# Patient Record
Sex: FEMALE | Race: WHITE | Employment: FULL TIME | URBAN - NONMETROPOLITAN AREA
[De-identification: names, ages, dates, MRNs, and addresses within clinical notes are randomized per-mention and may not be internally consistent; named-entity substitution may affect disease eponyms.]

---

## 2017-01-09 ENCOUNTER — OFFICE VISIT (OUTPATIENT)
Dept: PRIMARY CARE CLINIC | Age: 27
End: 2017-01-09
Payer: COMMERCIAL

## 2017-01-09 VITALS
DIASTOLIC BLOOD PRESSURE: 62 MMHG | BODY MASS INDEX: 34.53 KG/M2 | RESPIRATION RATE: 18 BRPM | OXYGEN SATURATION: 96 % | HEART RATE: 76 BPM | WEIGHT: 220 LBS | SYSTOLIC BLOOD PRESSURE: 114 MMHG | HEIGHT: 67 IN

## 2017-01-09 DIAGNOSIS — F90.9 ADULT ADHD: Primary | ICD-10-CM

## 2017-01-09 DIAGNOSIS — L40.9 SCALP PSORIASIS: ICD-10-CM

## 2017-01-09 DIAGNOSIS — F41.9 ANXIETY: ICD-10-CM

## 2017-01-09 DIAGNOSIS — Z76.89 ENCOUNTER TO ESTABLISH CARE WITH NEW DOCTOR: ICD-10-CM

## 2017-01-09 PROCEDURE — 99204 OFFICE O/P NEW MOD 45 MIN: CPT | Performed by: INTERNAL MEDICINE

## 2017-01-09 RX ORDER — DEXTROAMPHETAMINE SACCHARATE, AMPHETAMINE ASPARTATE, DEXTROAMPHETAMINE SULFATE AND AMPHETAMINE SULFATE 3.75; 3.75; 3.75; 3.75 MG/1; MG/1; MG/1; MG/1
15 TABLET ORAL DAILY
Qty: 30 TABLET | Refills: 0 | Status: SHIPPED | OUTPATIENT
Start: 2017-01-09 | End: 2017-02-06 | Stop reason: SDUPTHER

## 2017-01-09 RX ORDER — DOXYCYCLINE HYCLATE 100 MG
100 TABLET ORAL 2 TIMES DAILY
Qty: 20 TABLET | Refills: 0 | Status: SHIPPED | OUTPATIENT
Start: 2017-01-09 | End: 2017-01-19

## 2017-01-09 RX ORDER — DEXTROAMPHETAMINE SACCHARATE, AMPHETAMINE ASPARTATE, DEXTROAMPHETAMINE SULFATE AND AMPHETAMINE SULFATE 2.5; 2.5; 2.5; 2.5 MG/1; MG/1; MG/1; MG/1
10 TABLET ORAL DAILY
COMMUNITY
End: 2017-01-09 | Stop reason: DRUGHIGH

## 2017-01-09 RX ORDER — CLONAZEPAM 0.25 MG/1
0.25 TABLET, ORALLY DISINTEGRATING ORAL 2 TIMES DAILY PRN
Qty: 60 TABLET | Refills: 1 | Status: SHIPPED | OUTPATIENT
Start: 2017-01-09 | End: 2017-08-10 | Stop reason: SDUPTHER

## 2017-01-09 ASSESSMENT — ENCOUNTER SYMPTOMS
CONSTIPATION: 0
DIARRHEA: 0
COUGH: 0
VOMITING: 0
BACK PAIN: 0
SHORTNESS OF BREATH: 0
NAUSEA: 0

## 2017-01-13 DIAGNOSIS — F90.9 ADULT ADHD: ICD-10-CM

## 2017-01-13 DIAGNOSIS — F41.9 ANXIETY: ICD-10-CM

## 2017-01-13 DIAGNOSIS — Z76.89 ENCOUNTER TO ESTABLISH CARE WITH NEW DOCTOR: ICD-10-CM

## 2017-01-13 LAB
ALBUMIN SERPL-MCNC: 4.2 G/DL (ref 3.5–5.2)
ALP BLD-CCNC: 58 U/L (ref 35–104)
ALT SERPL-CCNC: 14 U/L (ref 5–33)
ANION GAP SERPL CALCULATED.3IONS-SCNC: 14 MMOL/L (ref 7–19)
AST SERPL-CCNC: 19 U/L (ref 5–32)
BILIRUB SERPL-MCNC: 0.5 MG/DL (ref 0.2–1.2)
BILIRUBIN URINE: NEGATIVE
BLOOD, URINE: NEGATIVE
BUN BLDV-MCNC: 10 MG/DL (ref 6–20)
CALCIUM SERPL-MCNC: 8.8 MG/DL (ref 8.6–10)
CHLORIDE BLD-SCNC: 103 MMOL/L (ref 98–111)
CHOLESTEROL, TOTAL: 158 MG/DL (ref 160–199)
CLARITY: ABNORMAL
CO2: 24 MMOL/L (ref 22–29)
COLOR: YELLOW
CREAT SERPL-MCNC: 0.5 MG/DL (ref 0.5–0.9)
CREATININE URINE: 93.7 MG/DL (ref 4.2–622)
GFR NON-AFRICAN AMERICAN: >60
GLOBULIN: 2.5 G/DL
GLUCOSE BLD-MCNC: 100 MG/DL (ref 74–109)
GLUCOSE URINE: NEGATIVE MG/DL
HCT VFR BLD CALC: 41.7 % (ref 37–47)
HDLC SERPL-MCNC: 62 MG/DL (ref 65–121)
HEMOGLOBIN: 14.3 G/DL (ref 12–16)
KETONES, URINE: NEGATIVE MG/DL
LDL CHOLESTEROL CALCULATED: 86 MG/DL
LEUKOCYTE ESTERASE, URINE: NEGATIVE
MCH RBC QN AUTO: 32.2 PG (ref 27–31)
MCHC RBC AUTO-ENTMCNC: 34.3 G/DL (ref 33–37)
MCV RBC AUTO: 93.9 FL (ref 81–99)
NITRITE, URINE: NEGATIVE
PDW BLD-RTO: 12.1 % (ref 11.5–14.5)
PH UA: 6
PLATELET # BLD: 298 K/UL (ref 130–400)
PMV BLD AUTO: 9.9 FL (ref 7.4–10.4)
POTASSIUM SERPL-SCNC: 4.6 MMOL/L (ref 3.5–5)
PROTEIN PROTEIN: 11 MG/DL (ref 15–45)
PROTEIN UA: NEGATIVE MG/DL
RBC # BLD: 4.44 M/UL (ref 4.2–5.4)
SODIUM BLD-SCNC: 141 MMOL/L (ref 136–145)
SPECIFIC GRAVITY UA: 1.01
TOTAL PROTEIN: 6.7 G/DL (ref 6.6–8.7)
TRIGL SERPL-MCNC: 50 MG/DL (ref 150–199)
TSH SERPL DL<=0.05 MIU/L-ACNC: 3.03 UIU/ML (ref 0.27–4.2)
URIC ACID, SERUM: 3.8 MG/DL (ref 2.4–5.7)
UROBILINOGEN, URINE: 0.2 E.U./DL
WBC # BLD: 6 K/UL (ref 4.8–10.8)

## 2017-02-06 RX ORDER — DEXTROAMPHETAMINE SACCHARATE, AMPHETAMINE ASPARTATE, DEXTROAMPHETAMINE SULFATE AND AMPHETAMINE SULFATE 3.75; 3.75; 3.75; 3.75 MG/1; MG/1; MG/1; MG/1
15 TABLET ORAL DAILY
Qty: 30 TABLET | Refills: 0 | Status: SHIPPED | OUTPATIENT
Start: 2017-02-06 | End: 2017-03-14 | Stop reason: SDUPTHER

## 2017-03-06 ENCOUNTER — APPOINTMENT (OUTPATIENT)
Dept: GENERAL RADIOLOGY | Facility: HOSPITAL | Age: 27
End: 2017-03-06

## 2017-03-06 PROCEDURE — 73630 X-RAY EXAM OF FOOT: CPT

## 2017-03-14 RX ORDER — DEXTROAMPHETAMINE SACCHARATE, AMPHETAMINE ASPARTATE, DEXTROAMPHETAMINE SULFATE AND AMPHETAMINE SULFATE 3.75; 3.75; 3.75; 3.75 MG/1; MG/1; MG/1; MG/1
15 TABLET ORAL DAILY
Qty: 30 TABLET | Refills: 0 | Status: SHIPPED | OUTPATIENT
Start: 2017-03-14 | End: 2017-04-17 | Stop reason: SDUPTHER

## 2017-04-10 ENCOUNTER — OFFICE VISIT (OUTPATIENT)
Dept: PRIMARY CARE CLINIC | Age: 27
End: 2017-04-10
Payer: COMMERCIAL

## 2017-04-10 VITALS
HEIGHT: 67 IN | HEART RATE: 84 BPM | WEIGHT: 185 LBS | DIASTOLIC BLOOD PRESSURE: 68 MMHG | BODY MASS INDEX: 29.03 KG/M2 | SYSTOLIC BLOOD PRESSURE: 112 MMHG | OXYGEN SATURATION: 98 % | RESPIRATION RATE: 18 BRPM

## 2017-04-10 DIAGNOSIS — F41.9 ANXIETY: ICD-10-CM

## 2017-04-10 DIAGNOSIS — M54.2 NECK PAIN: Primary | ICD-10-CM

## 2017-04-10 DIAGNOSIS — F90.9 ADULT ADHD: ICD-10-CM

## 2017-04-10 PROCEDURE — 99214 OFFICE O/P EST MOD 30 MIN: CPT | Performed by: INTERNAL MEDICINE

## 2017-04-10 RX ORDER — MELOXICAM 15 MG/1
15 TABLET ORAL DAILY
Qty: 30 TABLET | Refills: 0 | Status: SHIPPED | OUTPATIENT
Start: 2017-04-10 | End: 2018-11-02 | Stop reason: SDUPTHER

## 2017-04-10 ASSESSMENT — ENCOUNTER SYMPTOMS
SHORTNESS OF BREATH: 0
CONSTIPATION: 0
COUGH: 0
DIARRHEA: 0
VOMITING: 0
NAUSEA: 0
BACK PAIN: 0

## 2017-04-17 RX ORDER — DEXTROAMPHETAMINE SACCHARATE, AMPHETAMINE ASPARTATE, DEXTROAMPHETAMINE SULFATE AND AMPHETAMINE SULFATE 3.75; 3.75; 3.75; 3.75 MG/1; MG/1; MG/1; MG/1
15 TABLET ORAL DAILY
Qty: 30 TABLET | Refills: 0 | Status: SHIPPED | OUTPATIENT
Start: 2017-04-17 | End: 2017-05-18 | Stop reason: SDUPTHER

## 2017-05-09 ENCOUNTER — APPOINTMENT (OUTPATIENT)
Dept: GENERAL RADIOLOGY | Facility: HOSPITAL | Age: 27
End: 2017-05-09

## 2017-05-09 ENCOUNTER — OFFICE VISIT (OUTPATIENT)
Dept: PRIMARY CARE CLINIC | Age: 27
End: 2017-05-09
Payer: COMMERCIAL

## 2017-05-09 ENCOUNTER — HOSPITAL ENCOUNTER (EMERGENCY)
Facility: HOSPITAL | Age: 27
Discharge: HOME | End: 2017-05-09
Admitting: EMERGENCY MEDICINE

## 2017-05-09 VITALS
TEMPERATURE: 98.1 F | OXYGEN SATURATION: 98 % | SYSTOLIC BLOOD PRESSURE: 110 MMHG | BODY MASS INDEX: 29.19 KG/M2 | HEIGHT: 67 IN | WEIGHT: 186 LBS | DIASTOLIC BLOOD PRESSURE: 62 MMHG | HEART RATE: 68 BPM | RESPIRATION RATE: 14 BRPM

## 2017-05-09 VITALS
WEIGHT: 165 LBS | RESPIRATION RATE: 20 BRPM | HEART RATE: 70 BPM | TEMPERATURE: 98.6 F | OXYGEN SATURATION: 98 % | BODY MASS INDEX: 25.01 KG/M2 | HEIGHT: 68 IN | DIASTOLIC BLOOD PRESSURE: 68 MMHG | SYSTOLIC BLOOD PRESSURE: 102 MMHG

## 2017-05-09 DIAGNOSIS — B37.31 VAGINAL YEAST INFECTION: ICD-10-CM

## 2017-05-09 DIAGNOSIS — B37.31 VAGINAL YEAST INFECTION: Primary | ICD-10-CM

## 2017-05-09 DIAGNOSIS — Z11.4 SCREENING FOR HIV WITHOUT PRESENCE OF RISK FACTORS: ICD-10-CM

## 2017-05-09 DIAGNOSIS — R30.0 DYSURIA: ICD-10-CM

## 2017-05-09 DIAGNOSIS — R53.83 FATIGUE, UNSPECIFIED TYPE: Primary | ICD-10-CM

## 2017-05-09 LAB
ALBUMIN SERPL-MCNC: 4.1 G/DL (ref 3.5–5)
ALBUMIN/GLOB SERPL: 1.5 G/DL (ref 1.1–2.5)
ALP SERPL-CCNC: 56 U/L (ref 24–120)
ALT SERPL W P-5'-P-CCNC: 21 U/L (ref 0–54)
AMPHET+METHAMPHET UR QL: NEGATIVE
ANION GAP SERPL CALCULATED.3IONS-SCNC: 10 MMOL/L (ref 4–13)
APPEARANCE FLUID: NORMAL
AST SERPL-CCNC: 20 U/L (ref 7–45)
B-HCG UR QL: NEGATIVE
BARBITURATES UR QL SCN: NEGATIVE
BASOPHILS # BLD AUTO: 0.03 10*3/MM3 (ref 0–0.2)
BASOPHILS NFR BLD AUTO: 0.5 % (ref 0–2)
BENZODIAZ UR QL SCN: NEGATIVE
BILIRUB SERPL-MCNC: 0.5 MG/DL (ref 0.1–1)
BILIRUB UR QL STRIP: NEGATIVE
BILIRUBIN, POC: NORMAL
BLOOD URINE, POC: NORMAL
BUN BLD-MCNC: 8 MG/DL (ref 5–21)
BUN/CREAT SERPL: 12.5 (ref 7–25)
CALCIUM SPEC-SCNC: 9.1 MG/DL (ref 8.4–10.4)
CANNABINOIDS SERPL QL: POSITIVE
CHLORIDE SERPL-SCNC: 100 MMOL/L (ref 98–110)
CLARITY UR: CLEAR
CLARITY, POC: NORMAL
CO2 SERPL-SCNC: 29 MMOL/L (ref 24–31)
COCAINE UR QL: NEGATIVE
COLOR UR: YELLOW
COLOR, POC: YELLOW
CREAT BLD-MCNC: 0.64 MG/DL (ref 0.5–1.4)
CRP SERPL-MCNC: <0.5 MG/DL (ref 0–0.99)
DEPRECATED RDW RBC AUTO: 40.8 FL (ref 40–54)
EOSINOPHIL # BLD AUTO: 0.03 10*3/MM3 (ref 0–0.7)
EOSINOPHIL NFR BLD AUTO: 0.5 % (ref 0–4)
ERYTHROCYTE [DISTWIDTH] IN BLOOD BY AUTOMATED COUNT: 12.2 % (ref 12–15)
FLUAV AG NPH QL: NEGATIVE
FLUBV AG NPH QL IA: NEGATIVE
GFR SERPL CREATININE-BSD FRML MDRD: 111 ML/MIN/1.73
GLOBULIN UR ELPH-MCNC: 2.8 GM/DL
GLUCOSE BLD-MCNC: 109 MG/DL (ref 70–100)
GLUCOSE BLDC GLUCOMTR-MCNC: 72 MG/DL (ref 70–130)
GLUCOSE UR STRIP-MCNC: NEGATIVE MG/DL
GLUCOSE URINE, POC: NORMAL
HBA1C MFR BLD: 5.1 %
HCT VFR BLD AUTO: 43.2 % (ref 37–47)
HGB BLD-MCNC: 15.1 G/DL (ref 12–16)
HGB UR QL STRIP.AUTO: NEGATIVE
IMM GRANULOCYTES # BLD: 0.02 10*3/MM3 (ref 0–0.03)
IMM GRANULOCYTES NFR BLD: 0.3 % (ref 0–5)
INTERNAL NEGATIVE CONTROL: NEGATIVE
INTERNAL POSITIVE CONTROL: POSITIVE
KETONES UR QL STRIP: NEGATIVE
KETONES, POC: NORMAL
LEUKOCYTE EST, POC: NORMAL
LEUKOCYTE ESTERASE UR QL STRIP.AUTO: NEGATIVE
LYMPHOCYTES # BLD AUTO: 2.11 10*3/MM3 (ref 0.72–4.86)
LYMPHOCYTES NFR BLD AUTO: 33.3 % (ref 15–45)
Lab: NORMAL
MCH RBC QN AUTO: 31.9 PG (ref 28–32)
MCHC RBC AUTO-ENTMCNC: 35 G/DL (ref 33–36)
MCV RBC AUTO: 91.1 FL (ref 82–98)
METHADONE UR QL SCN: NEGATIVE
MONOCYTES # BLD AUTO: 0.39 10*3/MM3 (ref 0.19–1.3)
MONOCYTES NFR BLD AUTO: 6.2 % (ref 4–12)
NEUTROPHILS # BLD AUTO: 3.76 10*3/MM3 (ref 1.87–8.4)
NEUTROPHILS NFR BLD AUTO: 59.2 % (ref 39–78)
NITRITE UR QL STRIP: NEGATIVE
NITRITE, POC: NORMAL
OPIATES UR QL: NEGATIVE
PCP UR QL SCN: NEGATIVE
PH UR STRIP.AUTO: <=5 [PH] (ref 5–8)
PH, POC: 5.5
PLATELET # BLD AUTO: 257 10*3/MM3 (ref 130–400)
PMV BLD AUTO: 9.6 FL (ref 6–12)
POTASSIUM BLD-SCNC: 4.2 MMOL/L (ref 3.5–5.3)
PROT SERPL-MCNC: 6.9 G/DL (ref 6.3–8.7)
PROT UR QL STRIP: NEGATIVE
PROTEIN, POC: NORMAL
RBC # BLD AUTO: 4.74 10*6/MM3 (ref 4.2–5.4)
SODIUM BLD-SCNC: 139 MMOL/L (ref 135–145)
SP GR UR STRIP: 1.02 (ref 1–1.03)
SPECIFIC GRAVITY, POC: 1.03
T4 FREE SERPL-MCNC: 1 NG/DL (ref 0.78–2.19)
TSH SERPL DL<=0.05 MIU/L-ACNC: 1.58 MIU/ML (ref 0.47–4.68)
UROBILINOGEN UR QL STRIP: NORMAL
UROBILINOGEN, POC: 0.2
WBC NRBC COR # BLD: 6.34 10*3/MM3 (ref 4.8–10.8)

## 2017-05-09 PROCEDURE — 80307 DRUG TEST PRSMV CHEM ANLYZR: CPT | Performed by: NURSE PRACTITIONER

## 2017-05-09 PROCEDURE — 87804 INFLUENZA ASSAY W/OPTIC: CPT | Performed by: NURSE PRACTITIONER

## 2017-05-09 PROCEDURE — 96360 HYDRATION IV INFUSION INIT: CPT

## 2017-05-09 PROCEDURE — 99283 EMERGENCY DEPT VISIT LOW MDM: CPT

## 2017-05-09 PROCEDURE — 80053 COMPREHEN METABOLIC PANEL: CPT | Performed by: NURSE PRACTITIONER

## 2017-05-09 PROCEDURE — 84443 ASSAY THYROID STIM HORMONE: CPT | Performed by: NURSE PRACTITIONER

## 2017-05-09 PROCEDURE — 85025 COMPLETE CBC W/AUTO DIFF WBC: CPT | Performed by: NURSE PRACTITIONER

## 2017-05-09 PROCEDURE — 71020 HC CHEST PA AND LATERAL: CPT

## 2017-05-09 PROCEDURE — 81002 URINALYSIS NONAUTO W/O SCOPE: CPT | Performed by: NURSE PRACTITIONER

## 2017-05-09 PROCEDURE — 86140 C-REACTIVE PROTEIN: CPT | Performed by: NURSE PRACTITIONER

## 2017-05-09 PROCEDURE — 82962 GLUCOSE BLOOD TEST: CPT

## 2017-05-09 PROCEDURE — 99213 OFFICE O/P EST LOW 20 MIN: CPT | Performed by: NURSE PRACTITIONER

## 2017-05-09 PROCEDURE — 84439 ASSAY OF FREE THYROXINE: CPT | Performed by: NURSE PRACTITIONER

## 2017-05-09 PROCEDURE — 81003 URINALYSIS AUTO W/O SCOPE: CPT | Performed by: NURSE PRACTITIONER

## 2017-05-09 RX ORDER — SODIUM CHLORIDE 0.9 % (FLUSH) 0.9 %
10 SYRINGE (ML) INJECTION AS NEEDED
Status: DISCONTINUED | OUTPATIENT
Start: 2017-05-09 | End: 2017-05-09 | Stop reason: HOSPADM

## 2017-05-09 RX ORDER — FLUTICASONE PROPIONATE 50 MCG
2 SPRAY, SUSPENSION (ML) NASAL DAILY
COMMUNITY
End: 2020-08-25

## 2017-05-09 RX ADMIN — SODIUM CHLORIDE 1000 ML: 9 INJECTION, SOLUTION INTRAVENOUS at 11:11

## 2017-05-09 ASSESSMENT — ENCOUNTER SYMPTOMS
ALLERGIC/IMMUNOLOGIC NEGATIVE: 1
RESPIRATORY NEGATIVE: 1
EYES NEGATIVE: 1
GASTROINTESTINAL NEGATIVE: 1

## 2017-05-12 LAB — HIV-1 AND HIV-2 ANTIBODIES: NEGATIVE

## 2017-05-17 ENCOUNTER — OFFICE VISIT (OUTPATIENT)
Dept: RETAIL CLINIC | Facility: CLINIC | Age: 27
End: 2017-05-17

## 2017-05-17 DIAGNOSIS — Z02.83 ENCOUNTER FOR DRUG SCREENING: Primary | ICD-10-CM

## 2017-05-18 RX ORDER — DEXTROAMPHETAMINE SACCHARATE, AMPHETAMINE ASPARTATE, DEXTROAMPHETAMINE SULFATE AND AMPHETAMINE SULFATE 3.75; 3.75; 3.75; 3.75 MG/1; MG/1; MG/1; MG/1
15 TABLET ORAL DAILY
Qty: 30 TABLET | Refills: 0 | Status: SHIPPED | OUTPATIENT
Start: 2017-05-18 | End: 2017-06-13 | Stop reason: SDUPTHER

## 2017-06-13 RX ORDER — DEXTROAMPHETAMINE SACCHARATE, AMPHETAMINE ASPARTATE, DEXTROAMPHETAMINE SULFATE AND AMPHETAMINE SULFATE 3.75; 3.75; 3.75; 3.75 MG/1; MG/1; MG/1; MG/1
15 TABLET ORAL DAILY
Qty: 30 TABLET | Refills: 0 | Status: SHIPPED | OUTPATIENT
Start: 2017-06-17 | End: 2017-07-31 | Stop reason: SDUPTHER

## 2017-07-05 ENCOUNTER — OFFICE VISIT (OUTPATIENT)
Dept: PRIMARY CARE CLINIC | Age: 27
End: 2017-07-05
Payer: COMMERCIAL

## 2017-07-05 VITALS
RESPIRATION RATE: 18 BRPM | BODY MASS INDEX: 30.29 KG/M2 | SYSTOLIC BLOOD PRESSURE: 102 MMHG | DIASTOLIC BLOOD PRESSURE: 68 MMHG | HEIGHT: 67 IN | TEMPERATURE: 97.9 F | HEART RATE: 68 BPM | WEIGHT: 193 LBS | OXYGEN SATURATION: 98 %

## 2017-07-05 DIAGNOSIS — R68.82 DECREASED LIBIDO: ICD-10-CM

## 2017-07-05 DIAGNOSIS — B07.0 PLANTAR WART: Primary | ICD-10-CM

## 2017-07-05 DIAGNOSIS — Z81.8: ICD-10-CM

## 2017-07-05 DIAGNOSIS — F39 MOOD DISORDER (HCC): ICD-10-CM

## 2017-07-05 PROCEDURE — 99213 OFFICE O/P EST LOW 20 MIN: CPT | Performed by: NURSE PRACTITIONER

## 2017-07-05 RX ORDER — LAMOTRIGINE 25 MG/1
25 TABLET ORAL DAILY
Qty: 60 TABLET | Refills: 0 | Status: SHIPPED | OUTPATIENT
Start: 2017-07-05 | End: 2018-11-02 | Stop reason: SDUPTHER

## 2017-07-05 ASSESSMENT — ENCOUNTER SYMPTOMS
SHORTNESS OF BREATH: 0
EYE REDNESS: 0
EYE DISCHARGE: 0
NAUSEA: 0
TROUBLE SWALLOWING: 0
BLOOD IN STOOL: 0
EYE PAIN: 0
CHEST TIGHTNESS: 0
ABDOMINAL PAIN: 0
VOMITING: 0
VOICE CHANGE: 0
DIARRHEA: 0

## 2017-07-31 RX ORDER — DEXTROAMPHETAMINE SACCHARATE, AMPHETAMINE ASPARTATE, DEXTROAMPHETAMINE SULFATE AND AMPHETAMINE SULFATE 3.75; 3.75; 3.75; 3.75 MG/1; MG/1; MG/1; MG/1
15 TABLET ORAL DAILY
Qty: 30 TABLET | Refills: 0 | Status: SHIPPED | OUTPATIENT
Start: 2017-07-31 | End: 2017-08-31 | Stop reason: SDUPTHER

## 2017-08-10 ENCOUNTER — OFFICE VISIT (OUTPATIENT)
Dept: PRIMARY CARE CLINIC | Age: 27
End: 2017-08-10
Payer: COMMERCIAL

## 2017-08-10 VITALS
WEIGHT: 185.2 LBS | DIASTOLIC BLOOD PRESSURE: 60 MMHG | SYSTOLIC BLOOD PRESSURE: 110 MMHG | RESPIRATION RATE: 17 BRPM | TEMPERATURE: 98.1 F | BODY MASS INDEX: 29.07 KG/M2 | HEIGHT: 67 IN | HEART RATE: 91 BPM | OXYGEN SATURATION: 98 %

## 2017-08-10 DIAGNOSIS — B07.0 PLANTAR WART: ICD-10-CM

## 2017-08-10 DIAGNOSIS — F90.9 ADULT ADHD: Primary | ICD-10-CM

## 2017-08-10 PROCEDURE — 99214 OFFICE O/P EST MOD 30 MIN: CPT | Performed by: INTERNAL MEDICINE

## 2017-08-10 RX ORDER — CLONAZEPAM 0.25 MG/1
0.25 TABLET, ORALLY DISINTEGRATING ORAL 2 TIMES DAILY PRN
Qty: 60 TABLET | Refills: 1 | Status: SHIPPED | OUTPATIENT
Start: 2017-08-10 | End: 2019-04-02 | Stop reason: ALTCHOICE

## 2017-08-10 ASSESSMENT — ENCOUNTER SYMPTOMS
COUGH: 0
SHORTNESS OF BREATH: 0
VOMITING: 0
CONSTIPATION: 0
DIARRHEA: 0
NAUSEA: 0
BACK PAIN: 0

## 2017-08-16 ENCOUNTER — OFFICE VISIT (OUTPATIENT)
Dept: PRIMARY CARE CLINIC | Age: 27
End: 2017-08-16
Payer: COMMERCIAL

## 2017-08-16 VITALS
TEMPERATURE: 97.8 F | RESPIRATION RATE: 18 BRPM | SYSTOLIC BLOOD PRESSURE: 112 MMHG | DIASTOLIC BLOOD PRESSURE: 72 MMHG | HEART RATE: 95 BPM | HEIGHT: 67 IN | OXYGEN SATURATION: 97 % | WEIGHT: 185 LBS | BODY MASS INDEX: 29.03 KG/M2

## 2017-08-16 DIAGNOSIS — F41.9 ANXIETY: ICD-10-CM

## 2017-08-16 DIAGNOSIS — F90.9 ADULT ADHD: Primary | ICD-10-CM

## 2017-08-16 PROCEDURE — 99214 OFFICE O/P EST MOD 30 MIN: CPT | Performed by: INTERNAL MEDICINE

## 2017-08-16 NOTE — MR AVS SNAPSHOT
After Visit Summary             Maury Ceja   2017 5:30 PM   Office Visit    Description:  Female : 1990   Provider:  Sergo Cortez DO   Department:  [address] [place] [address] and Future Appointments         Below is a list of your follow-up and future appointments. This may not be a complete list as you may have made appointments directly with providers that we are not aware of or your providers may have made some for you. Please call your providers to confirm appointments. It is important to keep your appointments. Please bring your current insurance card, photo ID, co-pay, and all medication bottles to your appointment. If self-pay, payment is expected at the time of service. Information from Your Visit        Department     Name Address Phone Fax    [facility] [address] [address] [phone]      Vital Signs     Blood Pressure Pulse Temperature Respirations Height Weight    112/72 95 97.8 °F (36.6 °C) (Temporal) 18 5' 7\" (1.702 m) 185 lb (83.9 kg)    Oxygen Saturation Body Mass Index Smoking Status             97% 28.98 kg/m2 Never Smoker         Additional Information about your Body Mass Index (BMI)           Your BMI as listed above is considered overweight (25.0-29.9). BMI is an estimate of body fat, calculated from your height and weight. The higher your BMI, the greater your risk of heart disease, high blood pressure, type 2 diabetes, stroke, gallstones, arthritis, sleep apnea, and certain cancers. BMI is not perfect. It may overestimate body fat in athletes and people who are more muscular. If your body fat is high you can improve your BMI by decreasing your calorie intake and becoming more physically active.      Learn more at: [employer]             Medications and Orders      Your Current Medications Are 6. Create a [employer] password. You can change your password at any time. 7. Enter your Password Reset Question and Answer. This can be used at a later time if you forget your password. 8. Enter your e-mail address. You will receive e-mail notification when new information is available in [address] 9. Click Sign Up. You can now view your medical record. Additional Information  If you have questions, please contact the physician practice where you receive care. Remember, [employer] is NOT to be used for urgent needs. For medical emergencies, dial 911. For questions regarding your [employer] account call [phone]. If you have a clinical question, please call your doctor's office.

## 2017-08-20 ENCOUNTER — HOSPITAL ENCOUNTER (EMERGENCY)
Facility: HOSPITAL | Age: 27
Discharge: HOME | End: 2017-08-20
Attending: EMERGENCY MEDICINE | Admitting: EMERGENCY MEDICINE

## 2017-08-20 VITALS
DIASTOLIC BLOOD PRESSURE: 47 MMHG | WEIGHT: 180 LBS | BODY MASS INDEX: 27.28 KG/M2 | OXYGEN SATURATION: 99 % | HEART RATE: 63 BPM | RESPIRATION RATE: 17 BRPM | SYSTOLIC BLOOD PRESSURE: 88 MMHG | TEMPERATURE: 98.2 F | HEIGHT: 68 IN

## 2017-08-20 DIAGNOSIS — R21 RASH: Primary | ICD-10-CM

## 2017-08-20 PROCEDURE — 25010000002 DIPHENHYDRAMINE PER 50 MG: Performed by: EMERGENCY MEDICINE

## 2017-08-20 PROCEDURE — 99283 EMERGENCY DEPT VISIT LOW MDM: CPT

## 2017-08-20 PROCEDURE — 96372 THER/PROPH/DIAG INJ SC/IM: CPT

## 2017-08-20 PROCEDURE — 25010000002 DEXAMETHASONE PER 1 MG: Performed by: EMERGENCY MEDICINE

## 2017-08-20 RX ORDER — DEXAMETHASONE SODIUM PHOSPHATE 10 MG/ML
10 INJECTION INTRAMUSCULAR; INTRAVENOUS ONCE
Status: COMPLETED | OUTPATIENT
Start: 2017-08-20 | End: 2017-08-20

## 2017-08-20 RX ORDER — FAMOTIDINE 20 MG/1
20 TABLET, FILM COATED ORAL 2 TIMES DAILY
Qty: 10 TABLET | Refills: 0 | Status: SHIPPED | OUTPATIENT
Start: 2017-08-20 | End: 2020-08-25

## 2017-08-20 RX ORDER — DIPHENHYDRAMINE HYDROCHLORIDE 50 MG/ML
25 INJECTION INTRAMUSCULAR; INTRAVENOUS ONCE
Status: COMPLETED | OUTPATIENT
Start: 2017-08-20 | End: 2017-08-20

## 2017-08-20 RX ORDER — METHYLPREDNISOLONE 4 MG/1
TABLET ORAL
Qty: 21 TABLET | Refills: 0 | Status: SHIPPED | OUTPATIENT
Start: 2017-08-20 | End: 2020-08-25

## 2017-08-20 RX ORDER — CETIRIZINE HYDROCHLORIDE 10 MG/1
10 TABLET ORAL DAILY
Qty: 10 TABLET | Refills: 0 | Status: SHIPPED | OUTPATIENT
Start: 2017-08-20 | End: 2020-08-25

## 2017-08-20 RX ADMIN — DEXAMETHASONE SODIUM PHOSPHATE 10 MG: 10 INJECTION, SOLUTION INTRAMUSCULAR; INTRAVENOUS at 04:01

## 2017-08-20 RX ADMIN — DIPHENHYDRAMINE HYDROCHLORIDE 25 MG: 50 INJECTION, SOLUTION INTRAMUSCULAR; INTRAVENOUS at 04:01

## 2017-08-20 NOTE — ED PROVIDER NOTES
Subjective   Patient is a 27 y.o. female presenting with rash.   Rash   Location:  Full body  Quality: itchiness    Quality: not blistering, not draining, not painful, not red, not scaling, not swelling and not weeping    Severity:  Moderate  Onset quality:  Gradual  Timing:  Constant  Progression:  Worsening  Chronicity:  New  Context: not animal contact, not chemical exposure, not eggs, not food, not insect bite/sting, not medications, not new detergent/soap, not plant contact, not pregnancy and not sick contacts    Relieved by:  Nothing  Worsened by:  Nothing  Ineffective treatments:  None tried  Associated symptoms: no abdominal pain, no diarrhea, no fatigue, no fever, no headaches, no joint pain, no myalgias, no nausea, no periorbital edema, no sore throat, no throat swelling, no tongue swelling, no URI and not wheezing        Review of Systems   Constitutional: Negative.  Negative for fatigue and fever.   HENT: Negative.  Negative for sore throat.    Eyes: Negative.    Respiratory: Negative.  Negative for wheezing.    Cardiovascular: Negative.    Gastrointestinal: Negative.  Negative for abdominal pain, diarrhea and nausea.   Musculoskeletal: Negative.  Negative for arthralgias, back pain, myalgias and neck pain.   Skin: Positive for rash.   Neurological: Negative.  Negative for headaches.   All other systems reviewed and are negative.      Past Medical History:   Diagnosis Date   • ADD (attention deficit disorder)    • Anxiety        Allergies   Allergen Reactions   • Keflex [Cephalexin]        Past Surgical History:   Procedure Laterality Date   • CHOLECYSTECTOMY         History reviewed. No pertinent family history.    Social History     Social History   • Marital status: Single     Spouse name: N/A   • Number of children: N/A   • Years of education: N/A     Social History Main Topics   • Smoking status: Never Smoker   • Smokeless tobacco: None   • Alcohol use Yes      Comment: occ.    • Drug use: No   •  Sexual activity: Not Asked     Other Topics Concern   • None     Social History Narrative           Objective   Physical Exam   Constitutional: She is oriented to person, place, and time. She appears well-developed and well-nourished.   HENT:   Head: Normocephalic and atraumatic.   Eyes: Conjunctivae and EOM are normal. Pupils are equal, round, and reactive to light.   Neck: Normal range of motion. Neck supple.   Cardiovascular: Normal rate, regular rhythm, normal heart sounds and intact distal pulses.    Pulmonary/Chest: Effort normal and breath sounds normal.   Abdominal: Soft. Bowel sounds are normal.   Musculoskeletal: Normal range of motion.   Neurological: She is alert and oriented to person, place, and time. She has normal reflexes.   Skin: Skin is warm and dry.   These lesions look like insect bites on her lower extremities upper extremities some on her trunk exposed body parts does not appear like scabies   Psychiatric: She has a normal mood and affect.   Nursing note and vitals reviewed.      Procedures         ED Course  ED Course                  MDM    Final diagnoses:   Xander Nix MD  08/20/17 0354       Yair Nix MD  08/20/17 0417

## 2017-08-22 NOTE — ED NOTES
"ED Call Back Questions    1. How are you doing since leaving the Emergency Department?    Doing fine  2. Do you have any questions about your discharge instructions? No     3. Have you filled your new prescriptions yet? Yes   a. Do you have any questions about those medications? No     4. Were you able to make a follow-up appointment with the physician? No     5. Do you have a primary care physician? Yes   a. If No, would you like for me to set you up with one? N/A  i. If Yes, “I will have our ED  give you a call right back at this number to work with you on the best time for an appointment.”    6. We are always looking to get better at what we do. Do you have any suggestions for what we can do to be even better? No   a. If Yes, \"Thank you for sharing your concerns. I apologize. I will follow up with our manager and patient . Would you like someone to call you back?\" No     7. Is there anything else I can do for you? No   Visit ok     Chas Jones  08/22/17 1049    "

## 2017-08-24 ENCOUNTER — OFFICE VISIT (OUTPATIENT)
Dept: PRIMARY CARE CLINIC | Age: 27
End: 2017-08-24
Payer: COMMERCIAL

## 2017-08-24 VITALS
HEART RATE: 86 BPM | BODY MASS INDEX: 28.82 KG/M2 | WEIGHT: 183.6 LBS | DIASTOLIC BLOOD PRESSURE: 78 MMHG | TEMPERATURE: 97.7 F | SYSTOLIC BLOOD PRESSURE: 118 MMHG | HEIGHT: 67 IN | OXYGEN SATURATION: 98 %

## 2017-08-24 DIAGNOSIS — J01.10 ACUTE NON-RECURRENT FRONTAL SINUSITIS: Primary | ICD-10-CM

## 2017-08-24 PROCEDURE — 99213 OFFICE O/P EST LOW 20 MIN: CPT | Performed by: NURSE PRACTITIONER

## 2017-08-24 RX ORDER — METHYLPREDNISOLONE ACETATE 80 MG/ML
80 INJECTION, SUSPENSION INTRA-ARTICULAR; INTRALESIONAL; INTRAMUSCULAR; SOFT TISSUE ONCE
Status: COMPLETED | OUTPATIENT
Start: 2017-08-24 | End: 2017-08-24

## 2017-08-24 RX ORDER — AMOXICILLIN AND CLAVULANATE POTASSIUM 875; 125 MG/1; MG/1
1 TABLET, FILM COATED ORAL 2 TIMES DAILY
Qty: 20 TABLET | Refills: 0 | Status: SHIPPED | OUTPATIENT
Start: 2017-08-24 | End: 2017-09-03

## 2017-08-24 RX ADMIN — METHYLPREDNISOLONE ACETATE 80 MG: 80 INJECTION, SUSPENSION INTRA-ARTICULAR; INTRALESIONAL; INTRAMUSCULAR; SOFT TISSUE at 17:09

## 2017-08-24 ASSESSMENT — ENCOUNTER SYMPTOMS
RHINORRHEA: 1
EYES NEGATIVE: 1
FACIAL SWELLING: 0
WHEEZING: 0
GASTROINTESTINAL NEGATIVE: 1
SORE THROAT: 1
SINUS PRESSURE: 1
APNEA: 0
COUGH: 1

## 2017-08-31 RX ORDER — FLUCONAZOLE 150 MG/1
150 TABLET ORAL ONCE
Qty: 1 TABLET | Refills: 1 | Status: SHIPPED | OUTPATIENT
Start: 2017-08-31 | End: 2017-08-31

## 2017-09-01 RX ORDER — DEXTROAMPHETAMINE SACCHARATE, AMPHETAMINE ASPARTATE, DEXTROAMPHETAMINE SULFATE AND AMPHETAMINE SULFATE 3.75; 3.75; 3.75; 3.75 MG/1; MG/1; MG/1; MG/1
15 TABLET ORAL DAILY
Qty: 30 TABLET | Refills: 0 | Status: SHIPPED | OUTPATIENT
Start: 2017-09-01 | End: 2017-10-05 | Stop reason: SDUPTHER

## 2017-09-20 ENCOUNTER — OFFICE VISIT (OUTPATIENT)
Dept: PRIMARY CARE CLINIC | Age: 27
End: 2017-09-20
Payer: COMMERCIAL

## 2017-09-20 VITALS
HEIGHT: 68 IN | WEIGHT: 186 LBS | HEART RATE: 83 BPM | DIASTOLIC BLOOD PRESSURE: 82 MMHG | OXYGEN SATURATION: 98 % | SYSTOLIC BLOOD PRESSURE: 122 MMHG | TEMPERATURE: 98.6 F | RESPIRATION RATE: 18 BRPM | BODY MASS INDEX: 28.19 KG/M2

## 2017-09-20 DIAGNOSIS — B07.0 PLANTAR WART: Primary | ICD-10-CM

## 2017-09-20 PROCEDURE — 17110 DESTRUCTION B9 LES UP TO 14: CPT | Performed by: INTERNAL MEDICINE

## 2017-09-28 ENCOUNTER — OFFICE VISIT (OUTPATIENT)
Dept: PRIMARY CARE CLINIC | Age: 27
End: 2017-09-28
Payer: COMMERCIAL

## 2017-09-28 VITALS
SYSTOLIC BLOOD PRESSURE: 110 MMHG | TEMPERATURE: 97.8 F | DIASTOLIC BLOOD PRESSURE: 60 MMHG | WEIGHT: 193.6 LBS | OXYGEN SATURATION: 98 % | BODY MASS INDEX: 29.34 KG/M2 | HEART RATE: 75 BPM | HEIGHT: 68 IN

## 2017-09-28 DIAGNOSIS — B07.0 PLANTAR WART: Primary | ICD-10-CM

## 2017-09-28 PROCEDURE — 17110 DESTRUCTION B9 LES UP TO 14: CPT | Performed by: INTERNAL MEDICINE

## 2017-09-29 RX ORDER — METRONIDAZOLE 500 MG/1
500 TABLET ORAL 3 TIMES DAILY
Qty: 21 TABLET | Refills: 0 | Status: SHIPPED | OUTPATIENT
Start: 2017-09-29 | End: 2017-10-06

## 2017-10-01 ASSESSMENT — ENCOUNTER SYMPTOMS
BACK PAIN: 0
VOMITING: 0
NAUSEA: 0
DIARRHEA: 0
CONSTIPATION: 0
SHORTNESS OF BREATH: 0
COUGH: 0

## 2017-10-01 NOTE — PROGRESS NOTES
[facility]  [address]  [address] [place] [address]  Dept: [phone]  Dept Fax: [phone]  Loc: [phone]    Tang Bethea is a 32 y.o. female who presents today for her medical conditions/complaints as noted below. Tang Bethea is c/o of   Chief Complaint   Patient presents with    Other         HPI:     HPI  Ms. Luis Virgen returns for follow-up of anxiety and ADHD. She is clinically doing well. Her breathing pattern is stable. Her appetite is stable. She denies any new complaints. Hemoglobin A1C (%)   Date Value   05/09/2017 5.1             ( goal A1C is < 7)   No results found for: LABMICR  LDL Calculated (mg/dL)   Date Value   01/13/2017 86       (goal LDL is <100)   AST (U/L)   Date Value   01/13/2017 19     ALT (U/L)   Date Value   01/13/2017 14     BUN (mg/dL)   Date Value   01/13/2017 10     BP Readings from Last 3 Encounters:   09/28/17 110/60   09/20/17 122/82   08/24/17 118/78          (goal 120/80)    History reviewed. No pertinent past medical history.    Past Surgical History:   Procedure Laterality Date    CHOLECYSTECTOMY         Family History   Problem Relation Age of Onset    Diabetes Maternal Grandmother     Cancer Paternal Grandfather        Social History   Substance Use Topics    Smoking status: Never Smoker    Smokeless tobacco: Never Used    Alcohol use Yes      Current Outpatient Prescriptions   Medication Sig Dispense Refill    clonazePAM (KLONOPIN) 0.25 MG disintegrating tablet Take 1 tablet by mouth 2 times daily as needed for Anxiety 60 tablet 1    meloxicam (MOBIC) 15 MG tablet Take 1 tablet by mouth daily 30 tablet 0    CRANBERRY EXTRACT PO Take by mouth      fluticasone (FLONASE) 50 MCG/ACT nasal spray 1 spray by Nasal route daily 1 Bottle 0    metroNIDAZOLE (FLAGYL) 500 MG tablet Take 1 tablet by mouth 3 times daily for 7 days 21 tablet 0    amphetamine-dextroamphetamine (ADDERALL) 15 MG tablet Take 1 tablet by mouth daily . 30 tablet 0    lamoTRIgine (LAMICTAL) 25 MG tablet Take 1 tablet by mouth daily Week 1 & 2: QHS, Week 3 & 4: 2 QHS. Monitor for rash. 60 tablet 0     No current facility-administered medications for this visit. Allergies   Allergen Reactions    Keflex [Cephalexin] Hives       Health Maintenance   Topic Date Due    Cervical cancer screen  03/16/2011    Flu vaccine (1) 10/05/2017 (Originally 9/1/2017)    DTaP/Tdap/Td vaccine (1 - Tdap) 05/17/2018 (Originally 3/16/2009)    HIV screen  Completed       Subjective:      Review of Systems   Constitutional: Negative for activity change and fatigue. Respiratory: Negative for cough and shortness of breath. Cardiovascular: Negative for chest pain and leg swelling. Gastrointestinal: Negative for constipation, diarrhea, nausea and vomiting. Genitourinary: Negative for difficulty urinating and dysuria. Musculoskeletal: Negative for arthralgias and back pain. Neurological: Negative for dizziness and headaches. Objective:     Physical Exam   Constitutional: She is oriented to person, place, and time. She appears well-developed and well-nourished. HENT:   Head: Normocephalic and atraumatic. Mouth/Throat: Oropharynx is clear and moist.   Eyes: Conjunctivae are normal. Pupils are equal, round, and reactive to light. Cardiovascular: Normal rate, regular rhythm and normal heart sounds. Pulmonary/Chest: Effort normal and breath sounds normal.   Abdominal: Soft. Musculoskeletal: Normal range of motion. Neurological: She is alert and oriented to person, place, and time. Skin: Skin is warm and dry. Vitals reviewed. /72  Pulse 95  Temp 97.8 °F (36.6 °C) (Temporal)   Resp 18  Ht 5' 7\" (1.702 m)  Wt 185 lb (83.9 kg)  SpO2 97%  BMI 28.98 kg/m2    Assessment:      1. Adult ADHD     2. Anxiety               Plan:      Return in about 4 months (around 12/16/2017).   Patient Instructions   Continue current

## 2017-10-05 RX ORDER — DEXTROAMPHETAMINE SACCHARATE, AMPHETAMINE ASPARTATE, DEXTROAMPHETAMINE SULFATE AND AMPHETAMINE SULFATE 3.75; 3.75; 3.75; 3.75 MG/1; MG/1; MG/1; MG/1
15 TABLET ORAL DAILY
Qty: 30 TABLET | Refills: 0 | Status: SHIPPED | OUTPATIENT
Start: 2017-10-05 | End: 2018-11-02 | Stop reason: SDUPTHER

## 2017-10-05 NOTE — TELEPHONE ENCOUNTER
Patient called today with request for refill on Adderall. Last office visit 09/28/17 with next scheduled appointment 10/17/17  Dose verified. Last UDS Schedule for next office visit.

## 2017-11-27 DIAGNOSIS — B07.0 PLANTAR WARTS: Primary | ICD-10-CM

## 2017-12-21 RX ORDER — LAMOTRIGINE 25 MG/1
25 TABLET ORAL
COMMUNITY
Start: 2017-07-05 | End: 2020-08-25

## 2017-12-21 RX ORDER — MELOXICAM 15 MG/1
15 TABLET ORAL DAILY
COMMUNITY
Start: 2017-04-10 | End: 2020-08-25

## 2018-03-02 ENCOUNTER — TELEPHONE (OUTPATIENT)
Dept: PRIMARY CARE CLINIC | Age: 28
End: 2018-03-02

## 2018-09-18 ENCOUNTER — HOSPITAL ENCOUNTER (OUTPATIENT)
Dept: PHYSICAL THERAPY | Facility: HOSPITAL | Age: 28
Discharge: HOME | End: 2018-09-18

## 2018-09-18 PROCEDURE — 97750 PHYSICAL PERFORMANCE TEST: CPT

## 2018-11-02 ENCOUNTER — OFFICE VISIT (OUTPATIENT)
Dept: PRIMARY CARE CLINIC | Age: 28
End: 2018-11-02
Payer: COMMERCIAL

## 2018-11-02 VITALS
OXYGEN SATURATION: 99 % | DIASTOLIC BLOOD PRESSURE: 60 MMHG | BODY MASS INDEX: 26.21 KG/M2 | TEMPERATURE: 97.6 F | SYSTOLIC BLOOD PRESSURE: 110 MMHG | HEART RATE: 70 BPM | HEIGHT: 67 IN | WEIGHT: 167 LBS

## 2018-11-02 DIAGNOSIS — F39 MOOD DISORDER (HCC): Chronic | ICD-10-CM

## 2018-11-02 DIAGNOSIS — F32.A ANXIETY AND DEPRESSION: Chronic | ICD-10-CM

## 2018-11-02 DIAGNOSIS — Z13.31 POSITIVE DEPRESSION SCREENING: ICD-10-CM

## 2018-11-02 DIAGNOSIS — F90.9 ATTENTION DEFICIT HYPERACTIVITY DISORDER (ADHD), UNSPECIFIED ADHD TYPE: Chronic | ICD-10-CM

## 2018-11-02 DIAGNOSIS — F41.9 ANXIETY AND DEPRESSION: Chronic | ICD-10-CM

## 2018-11-02 PROCEDURE — 99214 OFFICE O/P EST MOD 30 MIN: CPT | Performed by: NURSE PRACTITIONER

## 2018-11-02 PROCEDURE — G8431 POS CLIN DEPRES SCRN F/U DOC: HCPCS | Performed by: NURSE PRACTITIONER

## 2018-11-02 PROCEDURE — G0444 DEPRESSION SCREEN ANNUAL: HCPCS | Performed by: NURSE PRACTITIONER

## 2018-11-02 RX ORDER — DEXTROAMPHETAMINE SACCHARATE, AMPHETAMINE ASPARTATE, DEXTROAMPHETAMINE SULFATE AND AMPHETAMINE SULFATE 3.75; 3.75; 3.75; 3.75 MG/1; MG/1; MG/1; MG/1
15 TABLET ORAL DAILY
Qty: 30 TABLET | Refills: 0 | Status: SHIPPED | OUTPATIENT
Start: 2018-11-02 | End: 2018-12-07 | Stop reason: SDUPTHER

## 2018-11-02 RX ORDER — LAMOTRIGINE 25 MG/1
25 TABLET ORAL DAILY
Qty: 60 TABLET | Refills: 0 | Status: SHIPPED | OUTPATIENT
Start: 2018-11-02 | End: 2018-12-07

## 2018-11-02 RX ORDER — BUSPIRONE HYDROCHLORIDE 5 MG/1
5 TABLET ORAL 3 TIMES DAILY PRN
Qty: 90 TABLET | Refills: 2 | Status: SHIPPED | OUTPATIENT
Start: 2018-11-02 | End: 2018-12-02

## 2018-11-02 RX ORDER — MELOXICAM 15 MG/1
15 TABLET ORAL DAILY
Qty: 30 TABLET | Refills: 0 | Status: SHIPPED | OUTPATIENT
Start: 2018-11-02 | End: 2019-10-23

## 2018-11-02 ASSESSMENT — ENCOUNTER SYMPTOMS
SHORTNESS OF BREATH: 0
VOMITING: 0
CONSTIPATION: 0
COLOR CHANGE: 0
CHEST TIGHTNESS: 0
ABDOMINAL PAIN: 0
BACK PAIN: 0
WHEEZING: 0
APNEA: 0
DIARRHEA: 0
NAUSEA: 0
COUGH: 0
ABDOMINAL DISTENTION: 0

## 2018-11-02 ASSESSMENT — PATIENT HEALTH QUESTIONNAIRE - PHQ9
1. LITTLE INTEREST OR PLEASURE IN DOING THINGS: 1
2. FEELING DOWN, DEPRESSED OR HOPELESS: 0
5. POOR APPETITE OR OVEREATING: 0
6. FEELING BAD ABOUT YOURSELF - OR THAT YOU ARE A FAILURE OR HAVE LET YOURSELF OR YOUR FAMILY DOWN: 0
SUM OF ALL RESPONSES TO PHQ9 QUESTIONS 1 & 2: 1
7. TROUBLE CONCENTRATING ON THINGS, SUCH AS READING THE NEWSPAPER OR WATCHING TELEVISION: 3
SUM OF ALL RESPONSES TO PHQ QUESTIONS 1-9: 10
10. IF YOU CHECKED OFF ANY PROBLEMS, HOW DIFFICULT HAVE THESE PROBLEMS MADE IT FOR YOU TO DO YOUR WORK, TAKE CARE OF THINGS AT HOME, OR GET ALONG WITH OTHER PEOPLE: 2
SUM OF ALL RESPONSES TO PHQ QUESTIONS 1-9: 10
4. FEELING TIRED OR HAVING LITTLE ENERGY: 2
3. TROUBLE FALLING OR STAYING ASLEEP: 1
8. MOVING OR SPEAKING SO SLOWLY THAT OTHER PEOPLE COULD HAVE NOTICED. OR THE OPPOSITE, BEING SO FIGETY OR RESTLESS THAT YOU HAVE BEEN MOVING AROUND A LOT MORE THAN USUAL: 3
9. THOUGHTS THAT YOU WOULD BE BETTER OFF DEAD, OR OF HURTING YOURSELF: 0

## 2018-11-02 NOTE — PROGRESS NOTES
[facility]  [address] [address]  [address] [facility] [address]  Dept: [phone]  Dept Fax: [phone]  Loc: [phone]    Jose Syed is a 29 y.o. female who presents today for her medical conditions/complaintsas noted below. Jose Syed is c/o of Anxiety (Patient lost her insurance for about 4 months and has been without medication. Patient now has insurance and feels she needs to be back on medication. Patient states she had a Panic attack at work. ); Depression; and ADHD (Patient feels that doing without medication has caused problems at work)    HPI   This is a 44-year-old female patient who was previously followed by Dr. Jaime García prior to his departure. He was managing medications for mood disorder, ADHD and anxiety with depression and periodic panic attacks. Patient reports that she was doing well on her medication but did not fill the Klonopin was helping. Detailed HPI as outlined below  Mood Symptoms, anxiety with depression, history of ADHD:   Patient complains of a several year(s) history of difficulty concentrating, excessive worry and panic attacks: Which have been present and worsening since she has been off her medication. .  She denies insomnia, hypersomnia, obsessive thoughts, suicidal thoughts or behavior and impaired memory. Symptoms/signs of gerson: euphoria, racing thoughts and extreme nervousness and crowds and at work when stress occurs, Klonopin was not beneficial. Symptoms have been been stable until patient lost her insurance coverage and was out of her medication with time. The patient has a history of  anxiety disorder, panic disorder, ADHD/ADD and MOOD DISORDER unspecified. Pertinent family history: depression and anxiety disorder. External stressors: none. Current treatment includes: Previously on Lamictal 25 mg, Adderall 15 mg daily and when necessary Klonopin.  She is currently not on any medications but is really struggling and would like to restart her previous medicines minus the Klonopin. He is agreeable to try BuSpar instead. No past medical history on file. Past Surgical History:   Procedure Laterality Date    CHOLECYSTECTOMY         Family History   Problem Relation Age of Onset    Diabetes Maternal Grandmother     Cancer Paternal Grandfather        Social History   Substance Use Topics    Smoking status: Never Smoker    Smokeless tobacco: Never Used    Alcohol use Yes      Current Outpatient Prescriptions   Medication Sig Dispense Refill    amphetamine-dextroamphetamine (ADDERALL) 15 MG tablet Take 1 tablet by mouth daily for 30 days. . 30 tablet 0    lamoTRIgine (LAMICTAL) 25 MG tablet Take 1 tablet by mouth daily Week 1 & 2: QHS, Week 3 & 4: 2 QHS. Monitor for rash. 60 tablet 0    meloxicam (MOBIC) 15 MG tablet Take 1 tablet by mouth daily 30 tablet 0    busPIRone (BUSPAR) 5 MG tablet Take 1 tablet by mouth 3 times daily as needed (panic) 90 tablet 2    clonazePAM (KLONOPIN) 0.25 MG disintegrating tablet Take 1 tablet by mouth 2 times daily as needed for Anxiety 60 tablet 1    CRANBERRY EXTRACT PO Take by mouth      fluticasone (FLONASE) 50 MCG/ACT nasal spray 1 spray by Nasal route daily 1 Bottle 0     No current facility-administered medications for this visit. Allergies   Allergen Reactions    Keflex [Cephalexin] Hives       Health Maintenance   Topic Date Due    DTaP/Tdap/Td vaccine (1 - Tdap) 03/16/2009    Cervical cancer screen  03/16/2011    Flu vaccine (1) 09/01/2018    HIV screen  Completed       Subjective:     Review of Systems   Constitutional: Positive for fatigue. Negative for activity change, appetite change, fever and unexpected weight change. Respiratory: Negative for apnea, cough, chest tightness, shortness of breath and wheezing. Cardiovascular: Negative for chest pain, palpitations and leg swelling.    Gastrointestinal: Negative for abdominal distention, abdominal pain,

## 2018-12-03 DIAGNOSIS — F41.9 ANXIETY AND DEPRESSION: Chronic | ICD-10-CM

## 2018-12-03 DIAGNOSIS — F32.A ANXIETY AND DEPRESSION: Chronic | ICD-10-CM

## 2018-12-03 DIAGNOSIS — Z13.31 POSITIVE DEPRESSION SCREENING: ICD-10-CM

## 2018-12-03 DIAGNOSIS — F90.9 ATTENTION DEFICIT HYPERACTIVITY DISORDER (ADHD), UNSPECIFIED ADHD TYPE: Chronic | ICD-10-CM

## 2018-12-07 ENCOUNTER — OFFICE VISIT (OUTPATIENT)
Dept: PRIMARY CARE CLINIC | Age: 28
End: 2018-12-07
Payer: COMMERCIAL

## 2018-12-07 VITALS
WEIGHT: 165 LBS | HEIGHT: 68 IN | OXYGEN SATURATION: 98 % | HEART RATE: 76 BPM | BODY MASS INDEX: 25.01 KG/M2 | DIASTOLIC BLOOD PRESSURE: 62 MMHG | SYSTOLIC BLOOD PRESSURE: 98 MMHG | TEMPERATURE: 97.2 F

## 2018-12-07 DIAGNOSIS — Z13.31 POSITIVE DEPRESSION SCREENING: Chronic | ICD-10-CM

## 2018-12-07 DIAGNOSIS — F41.9 ANXIETY AND DEPRESSION: Chronic | ICD-10-CM

## 2018-12-07 DIAGNOSIS — M54.50 LUMBOSACRAL PAIN: Chronic | ICD-10-CM

## 2018-12-07 DIAGNOSIS — Z79.899 HIGH RISK MEDICATION USE: ICD-10-CM

## 2018-12-07 DIAGNOSIS — F90.9 ATTENTION DEFICIT HYPERACTIVITY DISORDER (ADHD), UNSPECIFIED ADHD TYPE: Chronic | ICD-10-CM

## 2018-12-07 DIAGNOSIS — F32.A ANXIETY AND DEPRESSION: Chronic | ICD-10-CM

## 2018-12-07 LAB

## 2018-12-07 PROCEDURE — 99213 OFFICE O/P EST LOW 20 MIN: CPT | Performed by: NURSE PRACTITIONER

## 2018-12-07 PROCEDURE — 80305 DRUG TEST PRSMV DIR OPT OBS: CPT | Performed by: NURSE PRACTITIONER

## 2018-12-07 RX ORDER — CYCLOBENZAPRINE HCL 10 MG
10 TABLET ORAL 3 TIMES DAILY PRN
Qty: 90 TABLET | Refills: 1 | Status: SHIPPED | OUTPATIENT
Start: 2018-12-07 | End: 2019-01-06

## 2018-12-07 RX ORDER — FLUOXETINE 10 MG/1
10 TABLET, FILM COATED ORAL DAILY
Qty: 30 TABLET | Refills: 3 | Status: SHIPPED | OUTPATIENT
Start: 2018-12-07 | End: 2019-01-18 | Stop reason: SDUPTHER

## 2018-12-07 RX ORDER — DEXTROAMPHETAMINE SACCHARATE, AMPHETAMINE ASPARTATE, DEXTROAMPHETAMINE SULFATE AND AMPHETAMINE SULFATE 3.75; 3.75; 3.75; 3.75 MG/1; MG/1; MG/1; MG/1
15 TABLET ORAL DAILY
Qty: 30 TABLET | Refills: 0 | Status: SHIPPED | OUTPATIENT
Start: 2018-12-07 | End: 2018-12-10 | Stop reason: SDUPTHER

## 2018-12-10 DIAGNOSIS — Z13.31 POSITIVE DEPRESSION SCREENING: Chronic | ICD-10-CM

## 2018-12-10 DIAGNOSIS — F90.9 ATTENTION DEFICIT HYPERACTIVITY DISORDER (ADHD), UNSPECIFIED ADHD TYPE: Chronic | ICD-10-CM

## 2018-12-10 DIAGNOSIS — F32.A ANXIETY AND DEPRESSION: Chronic | ICD-10-CM

## 2018-12-10 DIAGNOSIS — F41.9 ANXIETY AND DEPRESSION: Chronic | ICD-10-CM

## 2018-12-12 RX ORDER — DEXTROAMPHETAMINE SACCHARATE, AMPHETAMINE ASPARTATE, DEXTROAMPHETAMINE SULFATE AND AMPHETAMINE SULFATE 3.75; 3.75; 3.75; 3.75 MG/1; MG/1; MG/1; MG/1
15 TABLET ORAL DAILY
Qty: 30 TABLET | Refills: 0 | Status: SHIPPED | OUTPATIENT
Start: 2018-12-12 | End: 2019-01-18 | Stop reason: SDUPTHER

## 2018-12-14 ENCOUNTER — HOSPITAL ENCOUNTER (OUTPATIENT)
Dept: GENERAL RADIOLOGY | Age: 28
Discharge: HOME | End: 2018-12-14
Payer: COMMERCIAL

## 2018-12-14 DIAGNOSIS — M54.50 LUMBOSACRAL PAIN: Chronic | ICD-10-CM

## 2018-12-14 PROCEDURE — 72100 X-RAY EXAM L-S SPINE 2/3 VWS: CPT

## 2018-12-28 ENCOUNTER — TELEPHONE (OUTPATIENT)
Dept: PRIMARY CARE CLINIC | Age: 28
End: 2018-12-28

## 2019-01-18 ENCOUNTER — OFFICE VISIT (OUTPATIENT)
Dept: PRIMARY CARE CLINIC | Age: 29
End: 2019-01-18
Payer: COMMERCIAL

## 2019-01-18 VITALS
BODY MASS INDEX: 25.01 KG/M2 | OXYGEN SATURATION: 99 % | DIASTOLIC BLOOD PRESSURE: 64 MMHG | SYSTOLIC BLOOD PRESSURE: 106 MMHG | TEMPERATURE: 97.6 F | HEART RATE: 74 BPM | HEIGHT: 68 IN | WEIGHT: 165 LBS

## 2019-01-18 DIAGNOSIS — F90.9 ADULT ADHD (ATTENTION DEFICIT HYPERACTIVITY DISORDER): Primary | ICD-10-CM

## 2019-01-18 DIAGNOSIS — F41.9 ANXIETY AND DEPRESSION: Chronic | ICD-10-CM

## 2019-01-18 DIAGNOSIS — F90.9 ATTENTION DEFICIT HYPERACTIVITY DISORDER (ADHD), UNSPECIFIED ADHD TYPE: Primary | Chronic | ICD-10-CM

## 2019-01-18 DIAGNOSIS — F32.A ANXIETY AND DEPRESSION: Chronic | ICD-10-CM

## 2019-01-18 DIAGNOSIS — Z13.31 POSITIVE DEPRESSION SCREENING: Chronic | ICD-10-CM

## 2019-01-18 PROCEDURE — 99212 OFFICE O/P EST SF 10 MIN: CPT | Performed by: NURSE PRACTITIONER

## 2019-01-18 RX ORDER — DEXTROAMPHETAMINE SACCHARATE, AMPHETAMINE ASPARTATE, DEXTROAMPHETAMINE SULFATE AND AMPHETAMINE SULFATE 3.75; 3.75; 3.75; 3.75 MG/1; MG/1; MG/1; MG/1
15 TABLET ORAL DAILY
Qty: 30 TABLET | Refills: 0 | Status: CANCELLED | OUTPATIENT
Start: 2019-01-18 | End: 2019-02-17

## 2019-01-18 RX ORDER — FLUOXETINE 10 MG/1
10 TABLET, FILM COATED ORAL DAILY
Qty: 30 TABLET | Refills: 3 | Status: SHIPPED | OUTPATIENT
Start: 2019-01-18 | End: 2019-05-25 | Stop reason: SDUPTHER

## 2019-01-20 PROBLEM — F90.9 ADULT ADHD (ATTENTION DEFICIT HYPERACTIVITY DISORDER): Status: ACTIVE | Noted: 2019-01-20

## 2019-01-20 PROBLEM — F32.A ANXIETY AND DEPRESSION: Chronic | Status: ACTIVE | Noted: 2019-01-20

## 2019-01-20 PROBLEM — F41.9 ANXIETY AND DEPRESSION: Chronic | Status: ACTIVE | Noted: 2019-01-20

## 2019-01-20 ASSESSMENT — ENCOUNTER SYMPTOMS
SHORTNESS OF BREATH: 0
WHEEZING: 0
APNEA: 0

## 2019-01-22 RX ORDER — DEXTROAMPHETAMINE SACCHARATE, AMPHETAMINE ASPARTATE, DEXTROAMPHETAMINE SULFATE AND AMPHETAMINE SULFATE 3.75; 3.75; 3.75; 3.75 MG/1; MG/1; MG/1; MG/1
15 TABLET ORAL DAILY
Qty: 30 TABLET | Refills: 0 | Status: SHIPPED | OUTPATIENT
Start: 2019-01-22 | End: 2019-03-26 | Stop reason: SDUPTHER

## 2019-01-22 RX ORDER — DEXTROAMPHETAMINE SACCHARATE, AMPHETAMINE ASPARTATE, DEXTROAMPHETAMINE SULFATE AND AMPHETAMINE SULFATE 3.75; 3.75; 3.75; 3.75 MG/1; MG/1; MG/1; MG/1
15 TABLET ORAL DAILY
Qty: 30 TABLET | Refills: 0 | Status: SHIPPED | OUTPATIENT
Start: 2019-02-18 | End: 2019-03-26 | Stop reason: SDUPTHER

## 2019-03-07 ASSESSMENT — ENCOUNTER SYMPTOMS
SHORTNESS OF BREATH: 0
WHEEZING: 0
CONSTIPATION: 0
ABDOMINAL PAIN: 0
BACK PAIN: 1
APNEA: 0
DIARRHEA: 0
CHEST TIGHTNESS: 0
VOMITING: 0
ABDOMINAL DISTENTION: 0
COLOR CHANGE: 0
NAUSEA: 0
COUGH: 0

## 2019-03-14 ENCOUNTER — OFFICE VISIT (OUTPATIENT)
Dept: PRIMARY CARE CLINIC | Age: 29
End: 2019-03-14
Payer: COMMERCIAL

## 2019-03-14 VITALS
RESPIRATION RATE: 18 BRPM | WEIGHT: 158.8 LBS | OXYGEN SATURATION: 99 % | SYSTOLIC BLOOD PRESSURE: 108 MMHG | DIASTOLIC BLOOD PRESSURE: 62 MMHG | BODY MASS INDEX: 24.07 KG/M2 | HEIGHT: 68 IN | HEART RATE: 76 BPM | TEMPERATURE: 97.4 F

## 2019-03-14 DIAGNOSIS — N30.00 ACUTE CYSTITIS WITHOUT HEMATURIA: Primary | ICD-10-CM

## 2019-03-14 DIAGNOSIS — N30.00 ACUTE CYSTITIS WITHOUT HEMATURIA: ICD-10-CM

## 2019-03-14 LAB
APPEARANCE FLUID: ABNORMAL
BILIRUBIN, POC: ABNORMAL
BLOOD URINE, POC: ABNORMAL
CLARITY, POC: ABNORMAL
COLOR, POC: YELLOW
GLUCOSE URINE, POC: ABNORMAL
KETONES, POC: ABNORMAL
LEUKOCYTE EST, POC: ABNORMAL
NITRITE, POC: ABNORMAL
PH, POC: 5.5
PROTEIN, POC: ABNORMAL
SPECIFIC GRAVITY, POC: 1.03
UROBILINOGEN, POC: 0.2

## 2019-03-14 PROCEDURE — 81002 URINALYSIS NONAUTO W/O SCOPE: CPT | Performed by: NURSE PRACTITIONER

## 2019-03-14 PROCEDURE — 99213 OFFICE O/P EST LOW 20 MIN: CPT | Performed by: NURSE PRACTITIONER

## 2019-03-14 RX ORDER — FLUCONAZOLE 150 MG/1
150 TABLET ORAL DAILY
Qty: 3 TABLET | Refills: 0 | Status: SHIPPED | OUTPATIENT
Start: 2019-03-14 | End: 2019-03-17

## 2019-03-14 RX ORDER — NITROFURANTOIN 25; 75 MG/1; MG/1
100 CAPSULE ORAL 2 TIMES DAILY
Qty: 14 CAPSULE | Refills: 0 | Status: SHIPPED | OUTPATIENT
Start: 2019-03-14 | End: 2019-03-21

## 2019-03-16 LAB
ORGANISM: ABNORMAL
URINE CULTURE, ROUTINE: ABNORMAL
URINE CULTURE, ROUTINE: ABNORMAL

## 2019-03-18 ENCOUNTER — TELEPHONE (OUTPATIENT)
Dept: PRIMARY CARE CLINIC | Age: 29
End: 2019-03-18

## 2019-03-25 ENCOUNTER — OFFICE VISIT (OUTPATIENT)
Dept: PRIMARY CARE CLINIC | Age: 29
End: 2019-03-25
Payer: COMMERCIAL

## 2019-03-25 DIAGNOSIS — F11.20 NARCOTIC DEPENDENCE (HCC): Primary | ICD-10-CM

## 2019-03-25 LAB
AMPHETAMINE SCREEN, URINE: POSITIVE
BARBITURATE SCREEN, URINE: NORMAL
BENZODIAZEPINE SCREEN, URINE: POSITIVE
BUPRENORPHINE URINE: NORMAL
COCAINE METABOLITE SCREEN URINE: NORMAL
GABAPENTIN SCREEN, URINE: NORMAL
MDMA URINE: NORMAL
METHADONE SCREEN, URINE: NORMAL
METHAMPHETAMINE, URINE: NORMAL
OPIATE SCREEN URINE: NORMAL
OXYCODONE SCREEN URINE: NORMAL
PHENCYCLIDINE SCREEN URINE: NORMAL
PROPOXYPHENE SCREEN, URINE: NORMAL
THC SCREEN, URINE: NORMAL
TRICYCLIC ANTIDEPRESSANTS, UR: NORMAL

## 2019-03-25 PROCEDURE — 80305 DRUG TEST PRSMV DIR OPT OBS: CPT | Performed by: NURSE PRACTITIONER

## 2019-03-25 PROCEDURE — 99212 OFFICE O/P EST SF 10 MIN: CPT | Performed by: NURSE PRACTITIONER

## 2019-03-26 DIAGNOSIS — F90.9 ATTENTION DEFICIT HYPERACTIVITY DISORDER (ADHD), UNSPECIFIED ADHD TYPE: Chronic | ICD-10-CM

## 2019-03-26 DIAGNOSIS — F41.9 ANXIETY AND DEPRESSION: Chronic | ICD-10-CM

## 2019-03-26 DIAGNOSIS — Z13.31 POSITIVE DEPRESSION SCREENING: Chronic | ICD-10-CM

## 2019-03-26 DIAGNOSIS — F32.A ANXIETY AND DEPRESSION: Chronic | ICD-10-CM

## 2019-03-27 RX ORDER — DEXTROAMPHETAMINE SACCHARATE, AMPHETAMINE ASPARTATE, DEXTROAMPHETAMINE SULFATE AND AMPHETAMINE SULFATE 3.75; 3.75; 3.75; 3.75 MG/1; MG/1; MG/1; MG/1
15 TABLET ORAL DAILY
Qty: 30 TABLET | Refills: 0 | Status: SHIPPED | OUTPATIENT
Start: 2019-03-27 | End: 2019-05-03 | Stop reason: SDUPTHER

## 2019-03-27 RX ORDER — DEXTROAMPHETAMINE SACCHARATE, AMPHETAMINE ASPARTATE, DEXTROAMPHETAMINE SULFATE AND AMPHETAMINE SULFATE 3.75; 3.75; 3.75; 3.75 MG/1; MG/1; MG/1; MG/1
15 TABLET ORAL DAILY
Qty: 30 TABLET | Refills: 0 | Status: SHIPPED | OUTPATIENT
Start: 2019-04-26 | End: 2019-04-02

## 2019-04-02 ENCOUNTER — OFFICE VISIT (OUTPATIENT)
Dept: PRIMARY CARE CLINIC | Age: 29
End: 2019-04-02
Payer: COMMERCIAL

## 2019-04-02 VITALS
HEART RATE: 74 BPM | DIASTOLIC BLOOD PRESSURE: 60 MMHG | SYSTOLIC BLOOD PRESSURE: 110 MMHG | BODY MASS INDEX: 25.01 KG/M2 | TEMPERATURE: 98.1 F | WEIGHT: 165 LBS | HEIGHT: 68 IN | OXYGEN SATURATION: 98 %

## 2019-04-02 DIAGNOSIS — R10.2 VAGINAL PAIN: Primary | ICD-10-CM

## 2019-04-02 DIAGNOSIS — R30.0 DYSURIA: ICD-10-CM

## 2019-04-02 LAB
APPEARANCE FLUID: NORMAL
BILIRUBIN, POC: NORMAL
BLOOD URINE, POC: NORMAL
CLARITY, POC: CLEAR
COLOR, POC: YELLOW
GLUCOSE URINE, POC: NORMAL
KETONES, POC: NORMAL
LEUKOCYTE EST, POC: NORMAL
NITRITE, POC: NORMAL
PH, POC: 7
PROTEIN, POC: NORMAL
SPECIFIC GRAVITY, POC: 1.02
UROBILINOGEN, POC: 0.2

## 2019-04-02 PROCEDURE — 81002 URINALYSIS NONAUTO W/O SCOPE: CPT | Performed by: NURSE PRACTITIONER

## 2019-04-02 PROCEDURE — 99214 OFFICE O/P EST MOD 30 MIN: CPT | Performed by: NURSE PRACTITIONER

## 2019-04-02 ASSESSMENT — PATIENT HEALTH QUESTIONNAIRE - PHQ9
SUM OF ALL RESPONSES TO PHQ QUESTIONS 1-9: 0
SUM OF ALL RESPONSES TO PHQ QUESTIONS 1-9: 0
2. FEELING DOWN, DEPRESSED OR HOPELESS: 0
SUM OF ALL RESPONSES TO PHQ9 QUESTIONS 1 & 2: 0
1. LITTLE INTEREST OR PLEASURE IN DOING THINGS: 0

## 2019-04-02 ASSESSMENT — ENCOUNTER SYMPTOMS
EYES NEGATIVE: 1
GASTROINTESTINAL NEGATIVE: 1
RESPIRATORY NEGATIVE: 1

## 2019-04-02 NOTE — PROGRESS NOTES
[facility]  [address] [facility] [facility]  [address] [facility] [address] [facility] [place] [address]  Dept: [phone]  Dept Fax: [phone]  Loc: [phone]    Kerry Marmolejo is a 34 y.o. female who presents today for her medical conditions/complaints as noted below. Kerry Marmolejo is c/o of Vaginal Pain (pain with intercourse occasionally-decreased stimulation)      Chief Complaint   Patient presents with    Vaginal Pain     pain with intercourse occasionally-decreased stimulation       HPI:     HPI   Patient here with complaints of vaginal pain. She reports that the pain is with intercourse and occasionally while standing. Patient was recently treated for UTI with Macrobid. She reports symptoms have not improved while taking the Macrobid. She reports having PAP 1 year ago and it was normal.     Past Medical History:   Diagnosis Date    Anxiety and depression 1/20/2019        Past Surgical History:   Procedure Laterality Date    CHOLECYSTECTOMY         Social History     Tobacco Use    Smoking status: Never Smoker    Smokeless tobacco: Never Used   Substance Use Topics    Alcohol use: Yes        Current Outpatient Medications   Medication Sig Dispense Refill    amphetamine-dextroamphetamine (ADDERALL) 15 MG tablet Take 1 tablet by mouth daily for 30 days. 30 tablet 0    FLUoxetine (PROZAC) 10 MG tablet Take 1 tablet by mouth daily 30 tablet 3    meloxicam (MOBIC) 15 MG tablet Take 1 tablet by mouth daily 30 tablet 0    CRANBERRY EXTRACT PO Take by mouth      fluticasone (FLONASE) 50 MCG/ACT nasal spray 1 spray by Nasal route daily 1 Bottle 0     No current facility-administered medications for this visit. Allergies   Allergen Reactions    Keflex [Cephalexin] Hives       Family History   Problem Relation Age of Onset    Diabetes Maternal Grandmother     Cancer Paternal Grandfather          Subjective:      Review of Systems   Constitutional: Negative. HENT: Negative.     Eyes: Negative. Respiratory: Negative. Cardiovascular: Negative. Gastrointestinal: Negative. Endocrine: Negative. Genitourinary: Positive for dysuria, pelvic pain and vaginal pain. Negative for vaginal bleeding and vaginal discharge. Musculoskeletal: Negative. Skin: Negative. Neurological: Negative. Hematological: Negative. Psychiatric/Behavioral: Negative. Objective:     Physical Exam   Constitutional: She is oriented to person, place, and time. Vital signs are normal. She appears well-developed and well-nourished. HENT:   Head: Normocephalic and atraumatic. Right Ear: Hearing and external ear normal.   Left Ear: Hearing and external ear normal.   Nose: Nose normal.   Mouth/Throat: Uvula is midline, oropharynx is clear and moist and mucous membranes are normal.   Eyes: Pupils are equal, round, and reactive to light. Conjunctivae, EOM and lids are normal.   Neck: Trachea normal and normal range of motion. Neck supple. No thyroid mass and no thyromegaly present. Cardiovascular: Normal rate, regular rhythm, normal heart sounds and intact distal pulses. Pulmonary/Chest: Effort normal and breath sounds normal.   Abdominal: Soft. Normal appearance and bowel sounds are normal.   Genitourinary: Cervix exhibits discharge. Cervix exhibits no motion tenderness and no friability. There is tenderness in the vagina. No erythema in the vagina. No signs of injury around the vagina. Vaginal discharge found. Musculoskeletal: Normal range of motion. Cervical back: Normal. She exhibits normal range of motion and no tenderness. Thoracic back: Normal. She exhibits normal range of motion and no tenderness. Lumbar back: Normal. She exhibits normal range of motion and no tenderness. Neurological: She is alert and oriented to person, place, and time. She has normal strength. Skin: Skin is warm, dry and intact. Psychiatric: She has a normal mood and affect.  Her speech is normal and behavior is normal. Judgment and thought content normal. Cognition and memory are normal.   Nursing note and vitals reviewed. /60   Pulse 74   Temp 98.1 °F (36.7 °C) (Temporal)   Ht 5' 8\" (1.727 m)   Wt 165 lb (74.8 kg)   SpO2 98%   BMI 25.09 kg/m²     Assessment:      Diagnosis Orders   1. Vaginal pain     2. Dysuria  POCT Urinalysis no Micro       No results found for this visit on 04/02/19. Plan:     Vaginal exam completed and swab obtained due to increased discharge and odor. This is being sent to [facility] and we will call with results once completed. UA completely in office. Treatment based on [facility] results. Patient may need pelvic ultrasound depending on results. Return if symptoms worsen or fail to improve, for Keep follow up as scheduled. Orders Placed This Encounter   Procedures    POCT Urinalysis no Micro       No orders of the defined types were placed in this encounter. Patient offered educational handouts and has had all questions answered. Patient voices understanding and agrees to plans along with risks and benefits of plan. Patient is instructed to continue prior meds, diet, and exercise plans as instructed. Patient agrees to follow up as instructed and sooner if needed. Patient agrees to go to ER if condition becomes emergent. EMR Dragon/transcription disclaimer: Some of this encounter note is an electronic transcription/translation of spoken language to printed text. The electronic translation of spoken language may permit erroneous, or at times, nonsensical words or phrases to be inadvertently transcribed.  Although I have reviewed the note for such errors, some may still exist.    Electronically signed by MICAELA Harvey on 4/2/2019 at 4:17 PM

## 2019-04-04 ENCOUNTER — TELEPHONE (OUTPATIENT)
Dept: PRIMARY CARE CLINIC | Age: 29
End: 2019-04-04

## 2019-04-05 ENCOUNTER — TELEPHONE (OUTPATIENT)
Dept: PRIMARY CARE CLINIC | Age: 29
End: 2019-04-05

## 2019-04-05 RX ORDER — METRONIDAZOLE 500 MG/1
500 TABLET ORAL 3 TIMES DAILY
Qty: 21 TABLET | Refills: 0 | Status: SHIPPED | OUTPATIENT
Start: 2019-04-05 | End: 2019-04-12

## 2019-04-05 RX ORDER — DOXYCYCLINE 100 MG/1
100 TABLET ORAL 2 TIMES DAILY
Qty: 20 TABLET | Refills: 0 | Status: SHIPPED | OUTPATIENT
Start: 2019-04-05 | End: 2019-04-15

## 2019-04-05 NOTE — TELEPHONE ENCOUNTER
Patient returned call,informed of diartherix results and rx was sent to pharmacy  All questions answered

## 2019-04-05 NOTE — TELEPHONE ENCOUNTER
Discussed [facility] results with Juliana Rogers and left message to call office related to two new medications sent to pharmacy related to results.

## 2019-04-26 ENCOUNTER — TELEPHONE (OUTPATIENT)
Dept: PRIMARY CARE CLINIC | Age: 29
End: 2019-04-26

## 2019-04-26 NOTE — TELEPHONE ENCOUNTER
Pt. Has been seen twice in the last 2 weeks with UTI and now states that she has burning sensation when she gets the urge to urinate. She states once she has urinated the urge goes away and she feels better. She states she has completed all of the prescribed abx and diflucan that was prescribed. Pt. States she has also said in apple cider vinager and used coconut oil in her labia area. Pt. Is wondering if she should be seen again or if something else can be sent in for pt.

## 2019-04-29 ENCOUNTER — TELEPHONE (OUTPATIENT)
Dept: PRIMARY CARE CLINIC | Age: 29
End: 2019-04-29

## 2019-05-01 DIAGNOSIS — F32.A ANXIETY AND DEPRESSION: Chronic | ICD-10-CM

## 2019-05-01 DIAGNOSIS — Z13.31 POSITIVE DEPRESSION SCREENING: Chronic | ICD-10-CM

## 2019-05-01 DIAGNOSIS — F90.9 ATTENTION DEFICIT HYPERACTIVITY DISORDER (ADHD), UNSPECIFIED ADHD TYPE: Chronic | ICD-10-CM

## 2019-05-01 DIAGNOSIS — F41.9 ANXIETY AND DEPRESSION: Chronic | ICD-10-CM

## 2019-05-01 RX ORDER — DEXTROAMPHETAMINE SACCHARATE, AMPHETAMINE ASPARTATE, DEXTROAMPHETAMINE SULFATE AND AMPHETAMINE SULFATE 3.75; 3.75; 3.75; 3.75 MG/1; MG/1; MG/1; MG/1
15 TABLET ORAL DAILY
Qty: 30 TABLET | Refills: 0 | Status: CANCELLED | OUTPATIENT
Start: 2019-05-01 | End: 2019-05-31

## 2019-05-01 NOTE — TELEPHONE ENCOUNTER
Luis Miguel Gay called requesting a refill of the below medication which has been pended for you:     Requested Prescriptions     Pending Prescriptions Disp Refills    amphetamine-dextroamphetamine (ADDERALL) 15 MG tablet 30 tablet 0     Sig: Take 1 tablet by mouth daily for 30 days.        Last Appointment Date: Visit 04/02/19  Next Appointment Date: Visit not scheduled    Allergies   Allergen Reactions    Keflex [Cephalexin] Hives                     3/25/2019  3:58 PM - Maria Esther Neil MA     Component Collected Lab   Amphetamine Screen, Urine 03/25/2019  3:57 PM Unknown   positive    Barbiturate Screen, Urine 03/25/2019  3:57 PM Unknown   neg    Benzodiazepine Screen, Urine 03/25/2019  3:57 PM Unknown   positive    Buprenorphine Urine 03/25/2019  3:57 PM Unknown   ng    Cocaine Metabolite Screen, Urine 03/25/2019  3:57 PM Unknown   neg    Gabapentin Screen, Urine 03/25/2019  3:57 PM Unknown   neg    MDMA, Urine 03/25/2019  3:57 PM Unknown   neg    Methamphetamine, Urine 03/25/2019  3:57 PM Unknown   neg    Methadone Screen, Urine 03/25/2019  3:57 PM Unknown   negn    Opiate Scrn, Ur 03/25/2019  3:57 PM Unknown   neg    Oxycodone Screen, Ur 03/25/2019  3:57 PM Unknown   neg    PCP Screen, Urine 03/25/2019  3:57 PM Unknown   neg    Propoxyphene Screen, Urine 03/25/2019  3:57 PM Unknown   neg    THC Screen, Urine 03/25/2019  3:57 PM Unknown   neg    Tricyclic Antidepressants, Urine 03/25/2019  3:57 PM Unknown   neg    Lab and Collection

## 2019-05-01 NOTE — TELEPHONE ENCOUNTER
We are having trouble with the script going through to the pharmacy. Please redo e-script once more.

## 2019-05-03 DIAGNOSIS — F41.9 ANXIETY AND DEPRESSION: Chronic | ICD-10-CM

## 2019-05-03 DIAGNOSIS — F32.A ANXIETY AND DEPRESSION: Chronic | ICD-10-CM

## 2019-05-03 DIAGNOSIS — F90.9 ATTENTION DEFICIT HYPERACTIVITY DISORDER (ADHD), UNSPECIFIED ADHD TYPE: Chronic | ICD-10-CM

## 2019-05-03 DIAGNOSIS — Z13.31 POSITIVE DEPRESSION SCREENING: Chronic | ICD-10-CM

## 2019-05-03 RX ORDER — DEXTROAMPHETAMINE SACCHARATE, AMPHETAMINE ASPARTATE, DEXTROAMPHETAMINE SULFATE AND AMPHETAMINE SULFATE 3.75; 3.75; 3.75; 3.75 MG/1; MG/1; MG/1; MG/1
15 TABLET ORAL DAILY
Qty: 30 TABLET | Refills: 0 | Status: SHIPPED | OUTPATIENT
Start: 2019-05-03 | End: 2019-06-04 | Stop reason: SDUPTHER

## 2019-05-25 DIAGNOSIS — F41.9 ANXIETY AND DEPRESSION: Chronic | ICD-10-CM

## 2019-05-25 DIAGNOSIS — F32.A ANXIETY AND DEPRESSION: Chronic | ICD-10-CM

## 2019-05-26 RX ORDER — FLUOXETINE 10 MG/1
10 TABLET, FILM COATED ORAL DAILY
Qty: 30 TABLET | Refills: 0 | Status: SHIPPED | OUTPATIENT
Start: 2019-05-26 | End: 2019-05-30 | Stop reason: SDUPTHER

## 2019-05-28 ENCOUNTER — TELEPHONE (OUTPATIENT)
Dept: PRIMARY CARE CLINIC | Age: 29
End: 2019-05-28

## 2019-05-28 DIAGNOSIS — F32.A ANXIETY AND DEPRESSION: Chronic | ICD-10-CM

## 2019-05-28 DIAGNOSIS — F41.9 ANXIETY AND DEPRESSION: Chronic | ICD-10-CM

## 2019-05-30 ENCOUNTER — OFFICE VISIT (OUTPATIENT)
Dept: PRIMARY CARE CLINIC | Age: 29
End: 2019-05-30
Payer: COMMERCIAL

## 2019-05-30 VITALS
WEIGHT: 175 LBS | SYSTOLIC BLOOD PRESSURE: 118 MMHG | DIASTOLIC BLOOD PRESSURE: 62 MMHG | TEMPERATURE: 97.9 F | HEIGHT: 68 IN | BODY MASS INDEX: 26.52 KG/M2 | OXYGEN SATURATION: 100 % | HEART RATE: 80 BPM

## 2019-05-30 DIAGNOSIS — I86.8 ABDOMINAL VARICOSITIES: Primary | ICD-10-CM

## 2019-05-30 DIAGNOSIS — F41.9 ANXIETY AND DEPRESSION: Chronic | ICD-10-CM

## 2019-05-30 DIAGNOSIS — F32.A ANXIETY AND DEPRESSION: Chronic | ICD-10-CM

## 2019-05-30 PROCEDURE — 99213 OFFICE O/P EST LOW 20 MIN: CPT | Performed by: NURSE PRACTITIONER

## 2019-05-30 RX ORDER — FLUOXETINE 10 MG/1
20 TABLET, FILM COATED ORAL DAILY
Qty: 60 TABLET | Refills: 0 | Status: SHIPPED | OUTPATIENT
Start: 2019-05-30 | End: 2019-10-23

## 2019-05-30 NOTE — PROGRESS NOTES
[address] [address]  Phone:  [phone]  Fax:  [phone]  Chief Complaint   Patient presents with    Leg Swelling    Leg Pain    Medication Problem     Patient would like to have Prozac increased.  Anxiety     Subjective:       Chichi Camejo is a 34 y.o. female who is here for evaluation of varicose veins. Symptoms include prominent veins on the both  spider veins on the left  lower extremity edema on the both legs. Symptoms have been ongoing for about several years. Symptoms have worsened, beginning a few months ago. Patient has not been evaluated for this previously. Evaluation to date has included:  none. Treatment to date has included: OTC stockings: No.    Anxiety  She has the following anxiety symptoms: difficulty concentrating, fatigue, feelings of losing control. Onset of symptoms was approximately a few months ago. Symptoms have been rapidly worsening since that time. She denies current suicidal and homicidal ideation. Family history significant for no psychiatric illness. Possible organic causes contributing are: none. Risk factors: none Previous treatment includes medication Prozac. She complains of the following medication side effects: none.       Past Medical History:   Diagnosis Date    Anxiety and depression 1/20/2019     Patient Active Problem List    Diagnosis Date Noted    Anxiety and depression 01/20/2019    Adult ADHD (attention deficit hyperactivity disorder) 01/20/2019     Past Surgical History:   Procedure Laterality Date    CHOLECYSTECTOMY       Family History   Problem Relation Age of Onset    Diabetes Maternal Grandmother     Cancer Paternal Grandfather      Social History     Socioeconomic History    Marital status: Single     Spouse name: Not on file    Number of children: Not on file    Years of education: Not on file    Highest education level: Not on file   Occupational History    Not on file   Social Needs    Financial resource strain: Not on file    Food insecurity:     Worry: Not on file     Inability: Not on file    Transportation needs:     Medical: Not on file     Non-medical: Not on file   Tobacco Use    Smoking status: Never Smoker    Smokeless tobacco: Never Used   Substance and Sexual Activity    Alcohol use: Yes    Drug use: No    Sexual activity: Yes     Partners: Male   Lifestyle    Physical activity:     Days per week: Not on file     Minutes per session: Not on file    Stress: Not on file   Relationships    Social connections:     Talks on phone: Not on file     Gets together: Not on file     Attends [employer] service: Not on file     Active member of club or organization: Not on file     Attends meetings of clubs or organizations: Not on file     Relationship status: Not on file    Intimate partner violence:     Fear of current or ex partner: Not on file     Emotionally abused: Not on file     Physically abused: Not on file     Forced sexual activity: Not on file   Other Topics Concern    Not on file   Social History Narrative    Not on file     Current Outpatient Medications   Medication Sig Dispense Refill    FLUoxetine (PROZAC) 10 MG tablet Take 2 tablets by mouth daily 60 tablet 0    amphetamine-dextroamphetamine (ADDERALL) 15 MG tablet Take 1 tablet by mouth daily for 30 days. 30 tablet 0    meloxicam (MOBIC) 15 MG tablet Take 1 tablet by mouth daily 30 tablet 0    CRANBERRY EXTRACT PO Take by mouth      fluticasone (FLONASE) 50 MCG/ACT nasal spray 1 spray by Nasal route daily 1 Bottle 0     No current facility-administered medications for this visit. Current Outpatient Medications on File Prior to Visit   Medication Sig Dispense Refill    amphetamine-dextroamphetamine (ADDERALL) 15 MG tablet Take 1 tablet by mouth daily for 30 days.  30 tablet 0    meloxicam (MOBIC) 15 MG tablet Take 1 tablet by mouth daily 30 tablet 0    CRANBERRY EXTRACT PO Take by mouth      fluticasone (FLONASE) 50 MCG/ACT nasal spray 1 spray by Nasal route daily 1 Bottle 0     No current facility-administered medications on file prior to visit. Allergies   Allergen Reactions    Keflex [Cephalexin] Hives       Review of Systems  Pertinent items are noted in HPI. Objective:      Physical Exam  Varicose Locations:  lower leg, bilateral, entire limb  ankle, left, proximal   Spider Vein Locations:  ankle, bilateral, flexor surface   Edema:  mild in the left lower leg(s)   Tenderness:  present in the left lower leg(s)   Stasis Dermatitis:  absent in the both lower leg(s)       Assessment:      Varicose veins which are symptomatic. No clinical evidence of chronic venous insufficiency. Diagnosis Orders   1. Abdominal varicosities  Michaela  Elicia Chase, [place], Vascular Surgery, [place]   2. Anxiety and depression Inadequately Controlled FLUoxetine (PROZAC) 10 MG tablet    Symptoms have worsened since being off medication will restart everything but try BuSpar still Klonopin. GENE Site ordered, recommend psych   3. MENTAL HEALTH EXAM:   Level of consciousness:  within normal limits  Appearance:  in chair  Behavior/Motor: intact  Attitude toward examiner:  cooperative, maintainseye contact   Speech:  spontaneous, normal rate, normal volume, well articulated   Mood:  Angry and depressed  Affect:  mood congruent  Thought processes:  linear  Thought content:  Denies HI or SI . Cognition:  Oriented to person, place and time  Concentration:intact  Memory intact  Plan:      Discussed the diagnosis with the patient. Recommended OTC compression stockings  Refer to Surgery because of the symptomatic nature of the problem. Analgesics PRN  Verbal patient instruction given. Increase Prozac one half tablet daily may gradually increase to two tablets for maximum dose of 20 mg. Patient also complains of anxiety and depression  Has a positive family history of similar problems. Denies suicidal/homicidal thoughts or ideation. Patient wishes to increase medication. Discussed medication side effects and risks/benefits of medication. Patient instructed to call or go to the ER if she has thoughts of harming herself or other's when taking this medication. Follow up as needed for acute illness.

## 2019-05-31 ENCOUNTER — TELEPHONE (OUTPATIENT)
Dept: VASCULAR SURGERY | Age: 29
End: 2019-05-31

## 2019-05-31 NOTE — TELEPHONE ENCOUNTER
Called Ms Bermudez Marlin number to tell her about referral appointment. Pts voice mailbox was full, unable to leave message.

## 2019-06-03 DIAGNOSIS — Z13.31 POSITIVE DEPRESSION SCREENING: Chronic | ICD-10-CM

## 2019-06-03 DIAGNOSIS — F32.A ANXIETY AND DEPRESSION: Chronic | ICD-10-CM

## 2019-06-03 DIAGNOSIS — F90.9 ATTENTION DEFICIT HYPERACTIVITY DISORDER (ADHD), UNSPECIFIED ADHD TYPE: Chronic | ICD-10-CM

## 2019-06-03 DIAGNOSIS — F41.9 ANXIETY AND DEPRESSION: Chronic | ICD-10-CM

## 2019-06-03 NOTE — TELEPHONE ENCOUNTER
Araceli Carreno called to request a refill on her medication. Last office visit : 5/30/2019   Next office visit : Visit date not found     Last UDS:   Amphetamine Screen, Urine   Date Value Ref Range Status   03/25/2019 positive  Final     Barbiturate Screen, Urine   Date Value Ref Range Status   03/25/2019 neg  Final     Benzodiazepine Screen, Urine   Date Value Ref Range Status   03/25/2019 positive  Final     Buprenorphine Urine   Date Value Ref Range Status   03/25/2019 ng  Final     Cocaine Metabolite Screen, Urine   Date Value Ref Range Status   03/25/2019 neg  Final     Gabapentin Screen, Urine   Date Value Ref Range Status   03/25/2019 neg  Final     MDMA, Urine   Date Value Ref Range Status   03/25/2019 neg  Final     Methamphetamine, Urine   Date Value Ref Range Status   03/25/2019 neg  Final     Opiate Scrn, Ur   Date Value Ref Range Status   03/25/2019 neg  Final     Oxycodone Screen, Ur   Date Value Ref Range Status   03/25/2019 neg  Final     PCP Screen, Urine   Date Value Ref Range Status   03/25/2019 neg  Final     Propoxyphene Screen, Urine   Date Value Ref Range Status   03/25/2019 neg  Final     THC Screen, Urine   Date Value Ref Range Status   03/25/2019 neg  Final     Tricyclic Antidepressants, Urine   Date Value Ref Range Status   03/25/2019 neg  Final       Last Patrecia Paresh: 03/26/19  Medication Contract: N/A   Last Fill: 05/03/19    Requested Prescriptions     Pending Prescriptions Disp Refills    amphetamine-dextroamphetamine (ADDERALL) 15 MG tablet 30 tablet 0     Sig: Take 1 tablet by mouth daily for 30 days. Please approve or refuse this medication.    Park Umanzor MA

## 2019-06-04 RX ORDER — DEXTROAMPHETAMINE SACCHARATE, AMPHETAMINE ASPARTATE, DEXTROAMPHETAMINE SULFATE AND AMPHETAMINE SULFATE 3.75; 3.75; 3.75; 3.75 MG/1; MG/1; MG/1; MG/1
15 TABLET ORAL DAILY
Qty: 30 TABLET | Refills: 0 | Status: SHIPPED | OUTPATIENT
Start: 2019-06-04 | End: 2019-10-23

## 2019-06-04 RX ORDER — DEXTROAMPHETAMINE SACCHARATE, AMPHETAMINE ASPARTATE, DEXTROAMPHETAMINE SULFATE AND AMPHETAMINE SULFATE 3.75; 3.75; 3.75; 3.75 MG/1; MG/1; MG/1; MG/1
15 TABLET ORAL DAILY
Qty: 30 TABLET | Refills: 0 | Status: SHIPPED | OUTPATIENT
Start: 2019-07-03 | End: 2019-10-23

## 2019-06-04 NOTE — TELEPHONE ENCOUNTER
LION was reviewed today per office protocol. Report shows No discrepancies. Fill pattern is consistent from single provider(s) at single pharmacy(s).     Presciption Escribed

## 2019-06-27 DIAGNOSIS — F41.9 ANXIETY AND DEPRESSION: Chronic | ICD-10-CM

## 2019-06-27 DIAGNOSIS — F32.A ANXIETY AND DEPRESSION: Chronic | ICD-10-CM

## 2019-06-27 RX ORDER — FLUOXETINE 10 MG/1
10 TABLET, FILM COATED ORAL DAILY
Qty: 30 TABLET | Refills: 0 | Status: SHIPPED | OUTPATIENT
Start: 2019-06-27 | End: 2019-10-23

## 2019-07-01 ENCOUNTER — TELEPHONE (OUTPATIENT)
Dept: PRIMARY CARE CLINIC | Age: 29
End: 2019-07-01

## 2019-07-11 ENCOUNTER — OFFICE VISIT (OUTPATIENT)
Dept: VASCULAR SURGERY | Age: 29
End: 2019-07-11
Payer: COMMERCIAL

## 2019-07-11 VITALS
SYSTOLIC BLOOD PRESSURE: 112 MMHG | DIASTOLIC BLOOD PRESSURE: 68 MMHG | RESPIRATION RATE: 18 BRPM | OXYGEN SATURATION: 98 % | HEART RATE: 77 BPM

## 2019-07-11 DIAGNOSIS — I83.899 SYMPTOMATIC RETICULAR VEINS: ICD-10-CM

## 2019-07-11 DIAGNOSIS — I83.813 VARICOSE VEINS OF BOTH LOWER EXTREMITIES WITH PAIN: ICD-10-CM

## 2019-07-11 PROCEDURE — 99203 OFFICE O/P NEW LOW 30 MIN: CPT | Performed by: NURSE PRACTITIONER

## 2019-07-12 PROBLEM — I83.93 VARICOSE VEINS OF LEGS: Status: ACTIVE | Noted: 2019-07-12

## 2019-07-12 PROBLEM — I83.899 SYMPTOMATIC RETICULAR VEINS: Status: ACTIVE | Noted: 2019-07-12

## 2019-07-12 NOTE — PROGRESS NOTES
Patient Care Team:  MICAELA Simmons as PCP - General (Nurse Practitioner)  [facility]      She has a known history of of varicose veins, spider veins and reticular veins. She has 2 previous pregnancies and just found out she is pregnant again. She reports prominent veins on the  Bilateral leg(s), spider veins on the  Bilateral leg(s), lower extremity edema on the  Bilateral leg(s) and pain is aggravated by upright posture. She reports previous treatment includes none. She does not have a history of spontaneous rupture. This is affecting daily living due to heaviness and aching of legs with standing. Differential diagnosis for leg pain includes but is not limited to: varicose veins, peripheral vascular disease, arthritic pain, DVT, lumbar disc disease, and neurogenic pain. Tatyana Greene is a 34 y.o. 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 GENE Site ordered, recommend psych      Adult ADHD (attention deficit hyperactivity disorder) 01/20/2019     Current Outpatient Medications   Medication Sig Dispense Refill    Prenatal Vit-DSS-Fe Cbn-FA (PRENATAL AD PO) Take 1 tablet by mouth daily      FLUoxetine (PROZAC) 10 MG tablet TAKE 1 TABLET BY MOUTH DAILY 30 tablet 0    amphetamine-dextroamphetamine (ADDERALL) 15 MG tablet Take 1 tablet by mouth daily for 30 days. 30 tablet 0    amphetamine-dextroamphetamine (ADDERALL, 15MG,) 15 MG tablet Take 1 tablet by mouth daily for 30 days.  30 tablet 0    FLUoxetine (PROZAC) 10 MG tablet Take 2 tablets by mouth daily 60 tablet 0    meloxicam (MOBIC) 15 MG tablet Take 1 tablet by mouth daily 30 tablet 0    CRANBERRY EXTRACT PO Take by mouth      fluticasone (FLONASE) 50 MCG/ACT nasal spray 1 spray by Nasal route daily 1 Bottle 0     No current facility-administered medications for this visit. Allergies: Keflex [cephalexin]  Past Medical History:   Diagnosis Date    Anxiety and depression 1/20/2019     Past Surgical History:   Procedure Laterality Date    CHOLECYSTECTOMY       Family History   Problem Relation Age of Onset    Diabetes Maternal Grandmother     Cancer Paternal Grandfather      Social History     Tobacco Use    Smoking status: Never Smoker    Smokeless tobacco: Never Used   Substance Use Topics    Alcohol use: Yes       Old records obtained from the referring provider. These records have been reviewed and summarized. Review of Systems    Constitutional - no significant activity change, appetite change, or unexpected weight change. No fever or chills. No diaphoresis or significant fatigue. HENT - no significant rhinorrhea or epistaxis. No tinnitus or significant hearing loss. Eyes - no sudden vision change or amaurosis. Respiratory - no significant shortness of breath, wheezing, or stridor. No apnea, cough, or chest tightness associated with shortness of breath. Cardiovascular - no chest pain, syncope, or significant dizziness. No palpitations. has leg swelling. No claudication. Gastrointestinal - no abdominal swelling or pain. No blood in stool. No severe constipation, diarrhea, nausea, or vomiting. Genitourinary - No difficulty urinating, dysuria, frequency, or urgency. No flank pain or hematuria. Musculoskeletal - no back pain, gait disturbance, or myalgia. Skin - no color change, rash, pallor, or new wound. Neurologic - no dizziness, facial asymmetry, or light headedness. No seizures. No speech difficulty or lateralizing weakness. Hematologic - no easy bruising or excessive bleeding. Psychiatric - no severe anxiety or nervousness. No confusion.   All other review of

## 2019-10-11 ENCOUNTER — TELEPHONE (OUTPATIENT)
Dept: PRIMARY CARE CLINIC | Age: 29
End: 2019-10-11

## 2019-10-23 ENCOUNTER — OFFICE VISIT (OUTPATIENT)
Dept: PRIMARY CARE CLINIC | Age: 29
End: 2019-10-23
Payer: COMMERCIAL

## 2019-10-23 VITALS
TEMPERATURE: 98.8 F | BODY MASS INDEX: 30.28 KG/M2 | RESPIRATION RATE: 20 BRPM | HEART RATE: 83 BPM | DIASTOLIC BLOOD PRESSURE: 68 MMHG | WEIGHT: 199.8 LBS | SYSTOLIC BLOOD PRESSURE: 110 MMHG | OXYGEN SATURATION: 98 % | HEIGHT: 68 IN

## 2019-10-23 DIAGNOSIS — F32.A ANXIETY AND DEPRESSION: Primary | Chronic | ICD-10-CM

## 2019-10-23 DIAGNOSIS — F41.9 ANXIETY AND DEPRESSION: Primary | Chronic | ICD-10-CM

## 2019-10-23 DIAGNOSIS — B37.31 VAGINA, CANDIDIASIS: ICD-10-CM

## 2019-10-23 LAB
APPEARANCE FLUID: ABNORMAL
BILIRUBIN, POC: ABNORMAL
BLOOD URINE, POC: ABNORMAL
CLARITY, POC: ABNORMAL
COLOR, POC: YELLOW
GLUCOSE URINE, POC: ABNORMAL
KETONES, POC: ABNORMAL
LEUKOCYTE EST, POC: ABNORMAL
NITRITE, POC: ABNORMAL
PH, POC: 6
PROTEIN, POC: ABNORMAL
SPECIFIC GRAVITY, POC: 1.03
UROBILINOGEN, POC: 0.2

## 2019-10-23 PROCEDURE — 81002 URINALYSIS NONAUTO W/O SCOPE: CPT | Performed by: NURSE PRACTITIONER

## 2019-10-23 PROCEDURE — 99214 OFFICE O/P EST MOD 30 MIN: CPT | Performed by: NURSE PRACTITIONER

## 2019-10-23 RX ORDER — SERTRALINE HYDROCHLORIDE 25 MG/1
25 TABLET, FILM COATED ORAL DAILY
Qty: 30 TABLET | Refills: 3 | Status: SHIPPED | OUTPATIENT
Start: 2019-10-23 | End: 2020-04-22

## 2019-10-23 RX ORDER — FLUCONAZOLE 150 MG/1
150 TABLET ORAL ONCE
Qty: 1 TABLET | Refills: 0 | Status: SHIPPED | OUTPATIENT
Start: 2019-10-23 | End: 2019-10-23

## 2019-10-23 ASSESSMENT — ENCOUNTER SYMPTOMS
SHORTNESS OF BREATH: 0
RHINORRHEA: 0
VOMITING: 0
VOICE CHANGE: 0
COUGH: 0
BACK PAIN: 0
COLOR CHANGE: 0
NAUSEA: 0
PHOTOPHOBIA: 0

## 2019-11-15 ENCOUNTER — OFFICE VISIT (OUTPATIENT)
Dept: PRIMARY CARE CLINIC | Age: 29
End: 2019-11-15
Payer: COMMERCIAL

## 2019-11-15 VITALS
SYSTOLIC BLOOD PRESSURE: 114 MMHG | OXYGEN SATURATION: 98 % | DIASTOLIC BLOOD PRESSURE: 72 MMHG | BODY MASS INDEX: 31.07 KG/M2 | TEMPERATURE: 98.1 F | HEIGHT: 68 IN | WEIGHT: 205 LBS | HEART RATE: 76 BPM

## 2019-11-15 DIAGNOSIS — F90.9 ADULT ADHD (ATTENTION DEFICIT HYPERACTIVITY DISORDER): Primary | ICD-10-CM

## 2019-11-15 PROCEDURE — 99213 OFFICE O/P EST LOW 20 MIN: CPT | Performed by: NURSE PRACTITIONER

## 2019-11-15 ASSESSMENT — ENCOUNTER SYMPTOMS
COLOR CHANGE: 0
BACK PAIN: 0
SHORTNESS OF BREATH: 0
RHINORRHEA: 0
COUGH: 0
NAUSEA: 0
VOMITING: 0
VOICE CHANGE: 0
PHOTOPHOBIA: 0

## 2019-11-18 RX ORDER — DEXTROAMPHETAMINE SACCHARATE, AMPHETAMINE ASPARTATE MONOHYDRATE, DEXTROAMPHETAMINE SULFATE AND AMPHETAMINE SULFATE 3.75; 3.75; 3.75; 3.75 MG/1; MG/1; MG/1; MG/1
15 CAPSULE, EXTENDED RELEASE ORAL DAILY
Qty: 30 CAPSULE | Refills: 0 | Status: SHIPPED | OUTPATIENT
Start: 2019-11-18 | End: 2020-01-01 | Stop reason: SDUPTHER

## 2020-02-04 RX ORDER — DEXTROAMPHETAMINE SACCHARATE, AMPHETAMINE ASPARTATE MONOHYDRATE, DEXTROAMPHETAMINE SULFATE AND AMPHETAMINE SULFATE 3.75; 3.75; 3.75; 3.75 MG/1; MG/1; MG/1; MG/1
15 CAPSULE, EXTENDED RELEASE ORAL DAILY
Qty: 30 CAPSULE | Refills: 0 | Status: SHIPPED | OUTPATIENT
Start: 2020-03-03 | End: 2020-03-02 | Stop reason: SDUPTHER

## 2020-02-04 RX ORDER — DEXTROAMPHETAMINE SACCHARATE, AMPHETAMINE ASPARTATE MONOHYDRATE, DEXTROAMPHETAMINE SULFATE AND AMPHETAMINE SULFATE 3.75; 3.75; 3.75; 3.75 MG/1; MG/1; MG/1; MG/1
15 CAPSULE, EXTENDED RELEASE ORAL DAILY
Qty: 30 CAPSULE | Refills: 0 | Status: SHIPPED | OUTPATIENT
Start: 2020-02-04 | End: 2020-03-02 | Stop reason: SDUPTHER

## 2020-03-02 RX ORDER — DEXTROAMPHETAMINE SACCHARATE, AMPHETAMINE ASPARTATE MONOHYDRATE, DEXTROAMPHETAMINE SULFATE AND AMPHETAMINE SULFATE 3.75; 3.75; 3.75; 3.75 MG/1; MG/1; MG/1; MG/1
15 CAPSULE, EXTENDED RELEASE ORAL DAILY
Qty: 30 CAPSULE | Refills: 0 | Status: SHIPPED | OUTPATIENT
Start: 2020-04-03 | End: 2020-09-14 | Stop reason: SDUPTHER

## 2020-03-02 RX ORDER — DEXTROAMPHETAMINE SACCHARATE, AMPHETAMINE ASPARTATE MONOHYDRATE, DEXTROAMPHETAMINE SULFATE AND AMPHETAMINE SULFATE 3.75; 3.75; 3.75; 3.75 MG/1; MG/1; MG/1; MG/1
15 CAPSULE, EXTENDED RELEASE ORAL DAILY
Qty: 30 CAPSULE | Refills: 0 | Status: SHIPPED | OUTPATIENT
Start: 2020-05-02 | End: 2020-05-11 | Stop reason: SDUPTHER

## 2020-03-02 NOTE — TELEPHONE ENCOUNTER
Karel Nichols called to request a refill on her medication. Last office visit : Visit date not found   Next office visit : Visit date not found     Last UDS:   Amphetamine Screen, Urine   Date Value Ref Range Status   03/25/2019 positive  Final     Barbiturate Screen, Urine   Date Value Ref Range Status   03/25/2019 neg  Final     Benzodiazepine Screen, Urine   Date Value Ref Range Status   03/25/2019 positive  Final     Buprenorphine Urine   Date Value Ref Range Status   03/25/2019 ng  Final     Cocaine Metabolite Screen, Urine   Date Value Ref Range Status   03/25/2019 neg  Final     Gabapentin Screen, Urine   Date Value Ref Range Status   03/25/2019 neg  Final     MDMA, Urine   Date Value Ref Range Status   03/25/2019 neg  Final     Methamphetamine, Urine   Date Value Ref Range Status   03/25/2019 neg  Final     Opiate Scrn, Ur   Date Value Ref Range Status   03/25/2019 neg  Final     Oxycodone Screen, Ur   Date Value Ref Range Status   03/25/2019 neg  Final     PCP Screen, Urine   Date Value Ref Range Status   03/25/2019 neg  Final     Propoxyphene Screen, Urine   Date Value Ref Range Status   03/25/2019 neg  Final     THC Screen, Urine   Date Value Ref Range Status   03/25/2019 neg  Final     Tricyclic Antidepressants, Urine   Date Value Ref Range Status   03/25/2019 neg  Final       Last Rod Phipps: 03-02-20  Medication Contract: Put in the CC note, never had a contract   Last Fill: [id number]    Requested Prescriptions     Pending Prescriptions Disp Refills    amphetamine-dextroamphetamine (ADDERALL XR) 15 MG extended release capsule 30 capsule 0     Sig: Take 1 capsule by mouth daily for 30 days.  amphetamine-dextroamphetamine (ADDERALL XR) 15 MG extended release capsule 30 capsule 0     Sig: Take 1 capsule by mouth daily for 30 days. Please approve or refuse this medication.    Thomasena Essex, MA

## 2020-04-22 ENCOUNTER — VIRTUAL VISIT (OUTPATIENT)
Dept: PRIMARY CARE CLINIC | Age: 30
End: 2020-04-22
Payer: COMMERCIAL

## 2020-04-22 PROCEDURE — 99214 OFFICE O/P EST MOD 30 MIN: CPT | Performed by: NURSE PRACTITIONER

## 2020-04-22 RX ORDER — CLONIDINE HYDROCHLORIDE 0.1 MG/1
0.1 TABLET ORAL NIGHTLY PRN
Qty: 30 TABLET | Refills: 1 | Status: SHIPPED | OUTPATIENT
Start: 2020-04-22 | End: 2020-04-22

## 2020-04-22 RX ORDER — FLUOXETINE 10 MG/1
10 TABLET, FILM COATED ORAL DAILY
Qty: 30 TABLET | Refills: 2 | Status: SHIPPED | OUTPATIENT
Start: 2020-04-22 | End: 2020-04-22 | Stop reason: SDUPTHER

## 2020-04-22 RX ORDER — CLONIDINE HYDROCHLORIDE 0.1 MG/1
TABLET ORAL
Qty: 90 TABLET | Refills: 0 | Status: SHIPPED | OUTPATIENT
Start: 2020-04-22 | End: 2020-07-20 | Stop reason: SDUPTHER

## 2020-04-22 RX ORDER — FLUOXETINE 10 MG/1
10 TABLET, FILM COATED ORAL DAILY
Qty: 30 TABLET | Refills: 2 | Status: SHIPPED | OUTPATIENT
Start: 2020-04-22 | End: 2020-07-20 | Stop reason: SDUPTHER

## 2020-04-23 ASSESSMENT — ENCOUNTER SYMPTOMS
GASTROINTESTINAL NEGATIVE: 1
RESPIRATORY NEGATIVE: 1
EYES NEGATIVE: 1

## 2020-05-11 RX ORDER — DEXTROAMPHETAMINE SACCHARATE, AMPHETAMINE ASPARTATE MONOHYDRATE, DEXTROAMPHETAMINE SULFATE AND AMPHETAMINE SULFATE 3.75; 3.75; 3.75; 3.75 MG/1; MG/1; MG/1; MG/1
15 CAPSULE, EXTENDED RELEASE ORAL DAILY
Qty: 30 CAPSULE | Refills: 0 | Status: SHIPPED | OUTPATIENT
Start: 2020-05-11 | End: 2020-06-09 | Stop reason: SDUPTHER

## 2020-05-20 ENCOUNTER — VIRTUAL VISIT (OUTPATIENT)
Dept: PRIMARY CARE CLINIC | Age: 30
End: 2020-05-20
Payer: COMMERCIAL

## 2020-05-20 PROCEDURE — 99214 OFFICE O/P EST MOD 30 MIN: CPT | Performed by: NURSE PRACTITIONER

## 2020-05-20 RX ORDER — CYCLOBENZAPRINE HCL 5 MG
5 TABLET ORAL NIGHTLY PRN
Qty: 30 TABLET | Refills: 0 | Status: SHIPPED | OUTPATIENT
Start: 2020-05-20 | End: 2020-05-30

## 2020-05-20 RX ORDER — PREDNISONE 10 MG/1
10 TABLET ORAL DAILY
Qty: 7 TABLET | Refills: 0 | Status: SHIPPED | OUTPATIENT
Start: 2020-05-20 | End: 2020-05-27

## 2020-05-20 ASSESSMENT — ENCOUNTER SYMPTOMS
EYES NEGATIVE: 1
GASTROINTESTINAL NEGATIVE: 1
RESPIRATORY NEGATIVE: 1

## 2020-05-20 NOTE — PROGRESS NOTES
amphetamine-dextroamphetamine (ADDERALL XR) 15 MG extended release capsule Take 1 capsule by mouth daily for 30 days. Cathy Kemp MD   Prenatal Vit-DSS-Fe Cbn-FA (PRENATAL AD PO) Take 1 tablet by mouth daily  Historical Provider, MD   CRANBERRY EXTRACT PO Take by mouth  Historical Provider, MD       Social History     Tobacco Use    Smoking status: Never Smoker    Smokeless tobacco: Never Used   Substance Use Topics    Alcohol use: Yes    Drug use: No        Allergies   Allergen Reactions    Keflex [Cephalexin] Hives   ,   Past Medical History:   Diagnosis Date    Anxiety and depression 1/20/2019   ,   Past Surgical History:   Procedure Laterality Date    CHOLECYSTECTOMY     ,   Family History   Problem Relation Age of Onset    Diabetes Maternal Grandmother     Cancer Paternal Grandfather        PHYSICAL EXAMINATION:  [ INSTRUCTIONS:  \"[x]\" Indicates a positive item  \"[]\" Indicates a negative item  -- DELETE ALL ITEMS NOT EXAMINED]  Vital Signs: (As obtained by patient/caregiver at home)        Constitutional: [x] Appears well-developed and well-nourished [x] No apparent distress      [] Abnormal   Mental status  [x] Alert and awake  [x] Oriented to person/place/time [x]Able to follow commands        Eyes:  EOM    [x]  Normal  [] Abnormal-  Sclera  [x]  Normal  [] Abnormal -         Discharge []  None visible  [] Abnormal -    HENT:   [x] Normocephalic, atraumatic.   [] Abnormal   [x] Mouth/Throat: Mucous membranes are moist.     External Ears [x] Normal  [] Abnormal-    Neck: [x] No visualized mass     Pulmonary/Chest: [x] Respiratory effort normal.  [x] No visualized signs of difficulty breathing or respiratory distress        [] Abnormal      Musculoskeletal:   [x] Normal gait with no signs of ataxia         [] Normal range of motion of neck        [] Abnormal       Neurological:        [x] No Facial Asymmetry (Cranial nerve 7 motor function) (limited exam to video visit)          [] No gaze palsy

## 2020-07-06 ENCOUNTER — TELEPHONE (OUTPATIENT)
Dept: PRIMARY CARE CLINIC | Age: 30
End: 2020-07-06

## 2020-07-14 RX ORDER — DEXTROAMPHETAMINE SACCHARATE, AMPHETAMINE ASPARTATE MONOHYDRATE, DEXTROAMPHETAMINE SULFATE AND AMPHETAMINE SULFATE 3.75; 3.75; 3.75; 3.75 MG/1; MG/1; MG/1; MG/1
15 CAPSULE, EXTENDED RELEASE ORAL DAILY
Qty: 30 CAPSULE | Refills: 0 | Status: SHIPPED | OUTPATIENT
Start: 2020-07-14 | End: 2020-08-13 | Stop reason: SDUPTHER

## 2020-07-20 RX ORDER — CLONIDINE HYDROCHLORIDE 0.1 MG/1
TABLET ORAL
Qty: 90 TABLET | Refills: 0 | Status: SHIPPED | OUTPATIENT
Start: 2020-07-20 | End: 2020-09-25

## 2020-07-20 RX ORDER — FLUOXETINE 10 MG/1
10 TABLET, FILM COATED ORAL DAILY
Qty: 30 TABLET | Refills: 2 | Status: SHIPPED | OUTPATIENT
Start: 2020-07-20 | End: 2020-10-19 | Stop reason: SDUPTHER

## 2020-07-20 NOTE — TELEPHONE ENCOUNTER
Gale Liu called to request a refill on her medication.       Last office visit : 05/20/2020   Next office visit : 8/25/2020     Requested Prescriptions     Signed Prescriptions Disp Refills    FLUoxetine (PROZAC) 10 MG tablet 30 tablet 2     Sig: Take 1 tablet by mouth daily     Authorizing Provider: Obed Patel     Ordering User: Ananya Sal cloNIDine (CATAPRES) 0.1 MG tablet 90 tablet 0     Sig: TAKE 1 TABLET BY MOUTH NIGHTLY AS NEEDED FOR INSOMNIA     Authorizing Provider: Obed Patel     Ordering User: Sveta Laird MA

## 2020-08-13 RX ORDER — DEXTROAMPHETAMINE SACCHARATE, AMPHETAMINE ASPARTATE MONOHYDRATE, DEXTROAMPHETAMINE SULFATE AND AMPHETAMINE SULFATE 3.75; 3.75; 3.75; 3.75 MG/1; MG/1; MG/1; MG/1
15 CAPSULE, EXTENDED RELEASE ORAL DAILY
Qty: 30 CAPSULE | Refills: 0 | Status: SHIPPED | OUTPATIENT
Start: 2020-08-13 | End: 2020-09-16 | Stop reason: SDUPTHER

## 2020-08-13 NOTE — TELEPHONE ENCOUNTER
Gertrudis Osullivan called to request a refill on her medication. Last office visit : 5/20/2020   Next office visit : 9/4/2020     Last UDS:   Amphetamine Screen, Urine   Date Value Ref Range Status   03/25/2019 positive  Final     Barbiturate Screen, Urine   Date Value Ref Range Status   03/25/2019 neg  Final     Benzodiazepine Screen, Urine   Date Value Ref Range Status   03/25/2019 positive  Final     Buprenorphine Urine   Date Value Ref Range Status   03/25/2019 ng  Final     Cocaine Metabolite Screen, Urine   Date Value Ref Range Status   03/25/2019 neg  Final     Gabapentin Screen, Urine   Date Value Ref Range Status   03/25/2019 neg  Final     MDMA, Urine   Date Value Ref Range Status   03/25/2019 neg  Final     Methamphetamine, Urine   Date Value Ref Range Status   03/25/2019 neg  Final     Opiate Scrn, Ur   Date Value Ref Range Status   03/25/2019 neg  Final     Oxycodone Screen, Ur   Date Value Ref Range Status   03/25/2019 neg  Final     PCP Screen, Urine   Date Value Ref Range Status   03/25/2019 neg  Final     Propoxyphene Screen, Urine   Date Value Ref Range Status   03/25/2019 neg  Final     THC Screen, Urine   Date Value Ref Range Status   03/25/2019 neg  Final     Tricyclic Antidepressants, Urine   Date Value Ref Range Status   03/25/2019 neg  Final       Last Leo Patrick: 08/13/2020  Medication Contract: not on file   Last Fill: 07/14/2020    Requested Prescriptions     Pending Prescriptions Disp Refills    amphetamine-dextroamphetamine (ADDERALL XR) 15 MG extended release capsule 30 capsule 0     Sig: Take 1 capsule by mouth daily for 30 days. Please approve or refuse this medication.    Hao Hernandez

## 2020-08-31 ENCOUNTER — HOSPITAL ENCOUNTER (OUTPATIENT)
Dept: GENERAL RADIOLOGY | Age: 30
Discharge: HOME | End: 2020-08-31
Payer: COMMERCIAL

## 2020-08-31 ENCOUNTER — VIRTUAL VISIT (OUTPATIENT)
Dept: PRIMARY CARE CLINIC | Age: 30
End: 2020-08-31
Payer: COMMERCIAL

## 2020-08-31 PROCEDURE — 99213 OFFICE O/P EST LOW 20 MIN: CPT | Performed by: NURSE PRACTITIONER

## 2020-08-31 PROCEDURE — 73562 X-RAY EXAM OF KNEE 3: CPT

## 2020-08-31 RX ORDER — NAPROXEN 500 MG/1
500 TABLET ORAL 2 TIMES DAILY PRN
Qty: 60 TABLET | Refills: 0 | Status: ON HOLD | OUTPATIENT
Start: 2020-08-31 | End: 2021-05-04 | Stop reason: ALTCHOICE

## 2020-08-31 RX ORDER — METHYLPREDNISOLONE 4 MG/1
TABLET ORAL
Qty: 1 KIT | Refills: 0 | Status: SHIPPED | OUTPATIENT
Start: 2020-08-31 | End: 2021-03-18

## 2020-08-31 ASSESSMENT — ENCOUNTER SYMPTOMS
VOICE CHANGE: 0
PHOTOPHOBIA: 0
NAUSEA: 0
SHORTNESS OF BREATH: 0
RHINORRHEA: 0
BACK PAIN: 0
COLOR CHANGE: 0
VOMITING: 0
COUGH: 0

## 2020-08-31 NOTE — PROGRESS NOTES
[address] [address]             Phone:  [phone]  Fax:  [phone]       2020    TELEHEALTH EVALUATION -- Audio/Visual (During [id number] public health emergency)    HPI:  Chief Complaint   Patient presents with    Knee Pain         Anibal Garnett (:  1990) has requested an audio/video evaluation for the following concern(s):    Patient presents today for evaluation of right knee pain. Patient states she is not sure if she overdid it at the gym last week or if when she bumped her knee on the toilet she injured her knee. She states she can barely walk and bear weight on right side. She states there is no obvious bruise or swelling visible. She has taken ibuprofen with little relief. Review of Systems   Constitutional: Negative for chills and fever. HENT: Negative for ear pain, hearing loss, rhinorrhea and voice change. Eyes: Negative for photophobia and visual disturbance. Respiratory: Negative for cough and shortness of breath. Cardiovascular: Negative for chest pain and palpitations. Gastrointestinal: Negative for nausea and vomiting. Endocrine: Negative. Negative for cold intolerance and heat intolerance. Genitourinary: Negative for difficulty urinating and flank pain. Musculoskeletal: Negative for back pain and neck pain. Skin: Negative for color change and rash. Allergic/Immunologic: Negative for environmental allergies and food allergies. Neurological: Negative for dizziness, speech difficulty and headaches. Hematological: Does not bruise/bleed easily. Psychiatric/Behavioral: Negative for sleep disturbance and suicidal ideas. Prior to Visit Medications    Medication Sig Taking? Authorizing Provider   methylPREDNISolone (MEDROL DOSEPACK) 4 MG tablet Take by mouth.  Yes MICAELA Ashraf   naproxen (NAPROSYN) 500 MG tablet Take 1 tablet by mouth 2 times daily as needed for Pain Yes MICAELA Ashraf amphetamine-dextroamphetamine (ADDERALL XR) 15 MG extended release capsule Take 1 capsule by mouth daily for 30 days. Allison Santiago MD   FLUoxetine (PROZAC) 10 MG tablet Take 1 tablet by mouth daily  MICAELA Myers   cloNIDine (CATAPRES) 0.1 MG tablet TAKE 1 TABLET BY MOUTH NIGHTLY AS NEEDED FOR INSOMNIA  MICAELA Myers   amphetamine-dextroamphetamine (ADDERALL XR) 15 MG extended release capsule Take 1 capsule by mouth daily for 30 days. Allison Santiago MD   Prenatal Vit-DSS-Fe Cbn-FA (PRENATAL AD PO) Take 1 tablet by mouth daily  Historical Provider, MD   CRANBERRY EXTRACT PO Take by mouth  Historical Provider, MD       Social History     Tobacco Use    Smoking status: Never Smoker    Smokeless tobacco: Never Used   Substance Use Topics    Alcohol use: Yes    Drug use: No        Allergies   Allergen Reactions    Keflex [Cephalexin] Hives   ,   Past Medical History:   Diagnosis Date    Anxiety and depression 1/20/2019   ,   Past Surgical History:   Procedure Laterality Date    CHOLECYSTECTOMY     ,   Social History     Tobacco Use    Smoking status: Never Smoker    Smokeless tobacco: Never Used   Substance Use Topics    Alcohol use:  Yes    Drug use: No   ,   Family History   Problem Relation Age of Onset    Diabetes Maternal Grandmother     Cancer Paternal Grandfather    ,   There is no immunization history on file for this patient.,   Health Maintenance   Topic Date Due    Varicella vaccine (1 of 2 - 2-dose childhood series) 03/16/1991    DTaP/Tdap/Td vaccine (1 - Tdap) 03/16/2009    Cervical cancer screen  03/16/2011    Flu vaccine (1) 09/01/2020    HIV screen  Completed    Hepatitis A vaccine  Aged Out    Hepatitis B vaccine  Aged Out    Hib vaccine  Aged Out    Meningococcal (ACWY) vaccine  Aged Out    Pneumococcal 0-64 years Vaccine  Aged Out       PHYSICAL EXAMINATION:  [ INSTRUCTIONS:  \"[x]\" Indicates a positive item  \"[]\" Indicates a negative item  -- DELETE ALL ITEMS NOT

## 2020-09-04 ENCOUNTER — VIRTUAL VISIT (OUTPATIENT)
Dept: PRIMARY CARE CLINIC | Age: 30
End: 2020-09-04
Payer: COMMERCIAL

## 2020-09-04 PROCEDURE — 99214 OFFICE O/P EST MOD 30 MIN: CPT | Performed by: NURSE PRACTITIONER

## 2020-09-04 NOTE — PROGRESS NOTES
30 days. Anna Cota MD   Prenatal Vit-DSS-Fe Cbn-FA (PRENATAL AD PO) Take 1 tablet by mouth daily  Historical Provider, MD   CRANBERRY EXTRACT PO Take by mouth  Historical Provider, MD       Social History     Tobacco Use    Smoking status: Never Smoker    Smokeless tobacco: Never Used   Substance Use Topics    Alcohol use: Yes    Drug use: No        Allergies   Allergen Reactions    Keflex [Cephalexin] Hives   ,   Past Medical History:   Diagnosis Date    Anxiety and depression 1/20/2019   ,   Past Surgical History:   Procedure Laterality Date    CHOLECYSTECTOMY     ,   Family History   Problem Relation Age of Onset    Diabetes Maternal Grandmother     Cancer Paternal Grandfather        PHYSICAL EXAMINATION:  [ INSTRUCTIONS:  \"[x]\" Indicates a positive item  \"[]\" Indicates a negative item  -- DELETE ALL ITEMS NOT EXAMINED]  Vital Signs: (As obtained by patient/caregiver at home)        Constitutional: [x] Appears well-developed and well-nourished [x] No apparent distress      [] Abnormal   Mental status  [x] Alert and awake  [x] Oriented to person/place/time [x]Able to follow commands        Eyes:  EOM    [x]  Normal  [] Abnormal-  Sclera  [x]  Normal  [] Abnormal -         Discharge []  None visible  [] Abnormal -    HENT:   [x] Normocephalic, atraumatic.   [] Abnormal   [x] Mouth/Throat: Mucous membranes are moist.     External Ears [x] Normal  [] Abnormal-    Neck: [x] No visualized mass     Pulmonary/Chest: [x] Respiratory effort normal.  [x] No visualized signs of difficulty breathing or respiratory distress        [] Abnormal      Musculoskeletal:   [x] Normal gait with no signs of ataxia         [x] Normal range of motion of neck        [] Abnormal       Neurological:        [x] No Facial Asymmetry (Cranial nerve 7 motor function) (limited exam to video visit)          [] No gaze palsy        [] Abnormal         Skin:        [x] No significant exanthematous lesions or discoloration noted on facial skin         [] Abnormal            Psychiatric:       [x] Normal Affect [] Abnormal        [] No Hallucinations    Other pertinent observable physical exam findings:-    Due to this being a TeleHealth encounter, evaluation of the following organ systems is limited: Vitals/Constitutional/EENT/Resp/CV/GI//MS/Neuro/Skin/Heme-Lymph-Imm. ASSESSMENT/PLAN:  1. Adult ADHD (attention deficit hyperactivity disorder)    - CBC Auto Differential; Future  - Comprehensive Metabolic Panel; Future  - TSH WITH REFLEX TO FT4; Future    2. Anxiety and depression      3. Elevated glucose    - Hemoglobin A1C; Future    4. Lipid screening    - Lipid, Fasting; Future    5. Drug therapy    - Urine Drug Screen; Future      I am checking labs - we will call with these results. She is to keep appointment with ortho as scheduled. Will continue all current meds at this time. Return in about 4 months (around 1/4/2021) for Office Visit, Refill with UDS. An  electronic signature was used to authenticate this note. --MICAELA Cronin on 9/7/2020 at 9:34 PM        Pursuant to the emergency declaration under the [facility], [facility] waiver authority and the [employer] Act, this Virtual  Visit was conducted, with patient's consent, to reduce the patient's risk of exposure to COVID-19 and provide continuity of care for an established patient. Services were provided through a video synchronous discussion virtually to substitute for in-person clinic visit.

## 2020-09-07 ASSESSMENT — ENCOUNTER SYMPTOMS
GASTROINTESTINAL NEGATIVE: 1
EYES NEGATIVE: 1
RESPIRATORY NEGATIVE: 1

## 2020-09-14 ENCOUNTER — TELEPHONE (OUTPATIENT)
Dept: PRIMARY CARE CLINIC | Age: 30
End: 2020-09-14

## 2020-09-14 NOTE — TELEPHONE ENCOUNTER
Brain Orr called to request a refill on her medication. Last office visit : 9/4/2020   Next office visit : 1/5/2021     Last UDS:   Amphetamine Screen, Urine   Date Value Ref Range Status   03/25/2019 positive  Final     Barbiturate Screen, Urine   Date Value Ref Range Status   03/25/2019 neg  Final     Benzodiazepine Screen, Urine   Date Value Ref Range Status   03/25/2019 positive  Final     Buprenorphine Urine   Date Value Ref Range Status   03/25/2019 ng  Final     Cocaine Metabolite Screen, Urine   Date Value Ref Range Status   03/25/2019 neg  Final     Gabapentin Screen, Urine   Date Value Ref Range Status   03/25/2019 neg  Final     MDMA, Urine   Date Value Ref Range Status   03/25/2019 neg  Final     Methamphetamine, Urine   Date Value Ref Range Status   03/25/2019 neg  Final     Opiate Scrn, Ur   Date Value Ref Range Status   03/25/2019 neg  Final     Oxycodone Screen, Ur   Date Value Ref Range Status   03/25/2019 neg  Final     PCP Screen, Urine   Date Value Ref Range Status   03/25/2019 neg  Final     Propoxyphene Screen, Urine   Date Value Ref Range Status   03/25/2019 neg  Final     THC Screen, Urine   Date Value Ref Range Status   03/25/2019 neg  Final     Tricyclic Antidepressants, Urine   Date Value Ref Range Status   03/25/2019 neg  Final       Last Faustino Ballardieu: 9-2020  Medication Contract: none   Last Fill: 8-13    Requested Prescriptions     Pending Prescriptions Disp Refills    amphetamine-dextroamphetamine (ADDERALL XR) 15 MG extended release capsule 30 capsule 0     Sig: Take 1 capsule by mouth daily for 30 days.  amphetamine-dextroamphetamine (ADDERALL XR) 15 MG extended release capsule 30 capsule 0     Sig: Take 1 capsule by mouth daily for 30 days. Please approve or refuse this medication.    Sanjana Delgado LPN

## 2020-09-14 NOTE — TELEPHONE ENCOUNTER
Patient called regarding MRI that was to be scheduled and she called [facility] and they have no information regarding order  Discussed with patient and scheduled for 9-16 at [address] [facility]

## 2020-09-14 NOTE — PROGRESS NOTES
Registration called and requested MRI order changed to with out contrast per radiology request  Order placed

## 2020-09-16 ENCOUNTER — HOSPITAL ENCOUNTER (OUTPATIENT)
Dept: MRI IMAGING | Age: 30
Discharge: HOME | End: 2020-09-16
Payer: COMMERCIAL

## 2020-09-16 PROCEDURE — 73721 MRI JNT OF LWR EXTRE W/O DYE: CPT

## 2020-09-16 RX ORDER — DEXTROAMPHETAMINE SACCHARATE, AMPHETAMINE ASPARTATE MONOHYDRATE, DEXTROAMPHETAMINE SULFATE AND AMPHETAMINE SULFATE 3.75; 3.75; 3.75; 3.75 MG/1; MG/1; MG/1; MG/1
15 CAPSULE, EXTENDED RELEASE ORAL DAILY
Qty: 30 CAPSULE | Refills: 0 | Status: ON HOLD | OUTPATIENT
Start: 2020-10-13 | End: 2021-05-04

## 2020-09-16 RX ORDER — DEXTROAMPHETAMINE SACCHARATE, AMPHETAMINE ASPARTATE MONOHYDRATE, DEXTROAMPHETAMINE SULFATE AND AMPHETAMINE SULFATE 3.75; 3.75; 3.75; 3.75 MG/1; MG/1; MG/1; MG/1
15 CAPSULE, EXTENDED RELEASE ORAL DAILY
Qty: 30 CAPSULE | Refills: 0 | Status: SHIPPED | OUTPATIENT
Start: 2020-09-16 | End: 2020-11-18 | Stop reason: SDUPTHER

## 2020-09-25 RX ORDER — CLONIDINE HYDROCHLORIDE 0.1 MG/1
TABLET ORAL
Qty: 90 TABLET | Refills: 0 | Status: SHIPPED | OUTPATIENT
Start: 2020-09-25 | End: 2020-11-24 | Stop reason: SDUPTHER

## 2020-10-19 RX ORDER — FLUOXETINE 10 MG/1
10 TABLET, FILM COATED ORAL DAILY
Qty: 30 TABLET | Refills: 2 | Status: SHIPPED | OUTPATIENT
Start: 2020-10-19 | End: 2020-11-30 | Stop reason: SDUPTHER

## 2020-10-19 NOTE — TELEPHONE ENCOUNTER
Andrewdaniel Blue called to request a refill on her medication.       Last office visit : 9/4/2020   Next office visit : 1/5/2021     Requested Prescriptions     Pending Prescriptions Disp Refills    FLUoxetine (PROZAC) 10 MG tablet 30 tablet 2     Sig: Take 1 tablet by mouth daily            Jon Cons

## 2020-11-18 RX ORDER — DEXTROAMPHETAMINE SACCHARATE, AMPHETAMINE ASPARTATE MONOHYDRATE, DEXTROAMPHETAMINE SULFATE AND AMPHETAMINE SULFATE 3.75; 3.75; 3.75; 3.75 MG/1; MG/1; MG/1; MG/1
15 CAPSULE, EXTENDED RELEASE ORAL DAILY
Qty: 30 CAPSULE | Refills: 0 | Status: SHIPPED | OUTPATIENT
Start: 2020-11-18 | End: 2021-03-22 | Stop reason: SDUPTHER

## 2020-11-18 NOTE — TELEPHONE ENCOUNTER
Yanira Farrell called to request a refill on her medication. Last office visit : 9/4/2020   Next office visit : 1/5/2021     Last UDS:   Amphetamine Screen, Urine   Date Value Ref Range Status   03/25/2019 positive  Final     Barbiturate Screen, Urine   Date Value Ref Range Status   03/25/2019 neg  Final     Benzodiazepine Screen, Urine   Date Value Ref Range Status   03/25/2019 positive  Final     Buprenorphine Urine   Date Value Ref Range Status   03/25/2019 ng  Final     Cocaine Metabolite Screen, Urine   Date Value Ref Range Status   03/25/2019 neg  Final     Gabapentin Screen, Urine   Date Value Ref Range Status   03/25/2019 neg  Final     MDMA, Urine   Date Value Ref Range Status   03/25/2019 neg  Final     Methamphetamine, Urine   Date Value Ref Range Status   03/25/2019 neg  Final     Opiate Scrn, Ur   Date Value Ref Range Status   03/25/2019 neg  Final     Oxycodone Screen, Ur   Date Value Ref Range Status   03/25/2019 neg  Final     PCP Screen, Urine   Date Value Ref Range Status   03/25/2019 neg  Final     Propoxyphene Screen, Urine   Date Value Ref Range Status   03/25/2019 neg  Final     THC Screen, Urine   Date Value Ref Range Status   03/25/2019 neg  Final     Tricyclic Antidepressants, Urine   Date Value Ref Range Status   03/25/2019 neg  Final       Last Daniel Rowley: 11/18/2020  Medication Contract: not on file   Last Fill: 10/13/2020    Requested Prescriptions     Pending Prescriptions Disp Refills    amphetamine-dextroamphetamine (ADDERALL XR) 15 MG extended release capsule 30 capsule 0     Sig: Take 1 capsule by mouth daily for 30 days. Please approve or refuse this medication.    Audrey Ashley

## 2020-11-24 RX ORDER — CLONIDINE HYDROCHLORIDE 0.1 MG/1
TABLET ORAL
Qty: 90 TABLET | Refills: 0 | Status: SHIPPED | OUTPATIENT
Start: 2020-11-24 | End: 2021-02-24

## 2020-11-30 ENCOUNTER — VIRTUAL VISIT (OUTPATIENT)
Dept: PRIMARY CARE CLINIC | Age: 30
End: 2020-11-30
Payer: COMMERCIAL

## 2020-11-30 PROCEDURE — 99213 OFFICE O/P EST LOW 20 MIN: CPT | Performed by: NURSE PRACTITIONER

## 2020-11-30 RX ORDER — FLUOXETINE 20 MG/1
20 TABLET, FILM COATED ORAL DAILY
Qty: 30 TABLET | Refills: 3 | Status: SHIPPED | OUTPATIENT
Start: 2020-11-30 | End: 2021-03-18

## 2020-11-30 ASSESSMENT — ENCOUNTER SYMPTOMS
VOICE CHANGE: 0
VOMITING: 0
RHINORRHEA: 0
PHOTOPHOBIA: 0
COUGH: 0
COLOR CHANGE: 0
NAUSEA: 0
SHORTNESS OF BREATH: 0
BACK PAIN: 0

## 2020-11-30 NOTE — PROGRESS NOTES
Oriented to person/place/time    [x] No apparent distress  [] Toxic appearing    [] Face flushed appearing [x] Sclera clear  [] Lips are cyanotic      [x] Breathing appears normal  [] Appears tachypneic      [] Rash on visible skin    [x] Cranial Nerves II-XII grossly intact    [x] Motor grossly intact in visible upper extremities    [x] Motor grossly intact in visible lower extremities    [x] Normal Mood  [] Anxious appearing    [] Depressed appearing  [] Confused appearing      [] Poor short term memory  [] Poor long term memory    [] OTHER:      Due to this being a TeleHealth encounter, evaluation of the following organ systems is limited: Vitals/Constitutional/EENT/Resp/CV/GI//MS/Neuro/Skin/Heme-Lymph-Imm. ASSESSMENT/PLAN:  1. Anxiety and depression  - Okay to increase to 20 mg prozac; will need to recheck symptoms in 1 month. - FLUoxetine (PROZAC) 20 MG tablet; Take 1 tablet by mouth daily  Dispense: 30 tablet; Refill: 3      Return in about 4 weeks (around 12/28/2020) for anxiety/depression check. An  electronic signature was used to authenticate this note. --MICAELA Howard on 12/10/2020 at [facility] to the emergency declaration under the [address] [place] [address], [facility] waiver authority and the [employer] Act, this Virtual  Visit was conducted, with patient's consent, to reduce the patient's risk of exposure to COVID-19 and provide continuity of care for an established patient. Services were provided through a video synchronous discussion virtually to substitute for in-person clinic visit.

## 2020-12-19 PROCEDURE — U0004 COV-19 TEST NON-CDC HGH THRU: HCPCS | Performed by: NURSE PRACTITIONER

## 2021-02-05 ENCOUNTER — TELEPHONE (OUTPATIENT)
Dept: ADMINISTRATIVE | Age: 31
End: 2021-02-05

## 2021-02-24 RX ORDER — CLONIDINE HYDROCHLORIDE 0.1 MG/1
TABLET ORAL
Qty: 90 TABLET | Refills: 0 | Status: SHIPPED | OUTPATIENT
Start: 2021-02-24 | End: 2021-05-28

## 2021-03-02 ENCOUNTER — TELEPHONE (OUTPATIENT)
Dept: PRIMARY CARE CLINIC | Age: 31
End: 2021-03-02

## 2021-03-02 DIAGNOSIS — F41.9 ANXIETY AND DEPRESSION: Primary | ICD-10-CM

## 2021-03-02 DIAGNOSIS — F32.A ANXIETY AND DEPRESSION: Primary | ICD-10-CM

## 2021-03-02 NOTE — TELEPHONE ENCOUNTER
Pt called and LM on VM states that insurance will not cover prozac in tablet form but will cover capsule. Okay to changed Rx to capsule and send to pharmacy?

## 2021-03-03 RX ORDER — FLUOXETINE HYDROCHLORIDE 20 MG/1
20 CAPSULE ORAL DAILY
Qty: 30 CAPSULE | Refills: 3 | Status: SHIPPED | OUTPATIENT
Start: 2021-03-03 | End: 2021-06-28

## 2021-03-03 NOTE — TELEPHONE ENCOUNTER
Jenn Cardona called to request a refill on her medication.       Last office visit : 11/30/2020   Next office visit : 3/18/2021     Requested Prescriptions     Signed Prescriptions Disp Refills    FLUoxetine (PROZAC) 20 MG capsule 30 capsule 3     Sig: Take 1 capsule by mouth daily     Authorizing Provider: Ko Miller     Ordering User: Santiago Chavez

## 2021-03-18 ENCOUNTER — OFFICE VISIT (OUTPATIENT)
Dept: PRIMARY CARE CLINIC | Age: 31
End: 2021-03-18
Payer: COMMERCIAL

## 2021-03-18 VITALS
TEMPERATURE: 98.8 F | HEART RATE: 82 BPM | WEIGHT: 196 LBS | DIASTOLIC BLOOD PRESSURE: 78 MMHG | OXYGEN SATURATION: 98 % | HEIGHT: 68 IN | BODY MASS INDEX: 29.7 KG/M2 | RESPIRATION RATE: 18 BRPM | SYSTOLIC BLOOD PRESSURE: 120 MMHG

## 2021-03-18 DIAGNOSIS — F41.9 ANXIETY AND DEPRESSION: Primary | ICD-10-CM

## 2021-03-18 DIAGNOSIS — F90.9 ADULT ADHD (ATTENTION DEFICIT HYPERACTIVITY DISORDER): ICD-10-CM

## 2021-03-18 DIAGNOSIS — R13.19 ESOPHAGEAL DYSPHAGIA: ICD-10-CM

## 2021-03-18 DIAGNOSIS — R09.89 GLOBUS SENSATION: ICD-10-CM

## 2021-03-18 DIAGNOSIS — Z79.899 DRUG THERAPY: ICD-10-CM

## 2021-03-18 DIAGNOSIS — F32.A ANXIETY AND DEPRESSION: Primary | ICD-10-CM

## 2021-03-18 LAB
ALCOHOL URINE: ABNORMAL
AMPHETAMINE SCREEN, URINE: ABNORMAL
BARBITURATE SCREEN, URINE: ABNORMAL
BENZODIAZEPINE SCREEN, URINE: ABNORMAL
BUPRENORPHINE URINE: ABNORMAL
COCAINE METABOLITE SCREEN URINE: ABNORMAL
FENTANYL SCREEN, URINE: ABNORMAL
GABAPENTIN SCREEN, URINE: ABNORMAL
MDMA URINE: ABNORMAL
METHADONE SCREEN, URINE: ABNORMAL
METHAMPHETAMINE, URINE: ABNORMAL
OPIATE SCREEN URINE: ABNORMAL
OXYCODONE SCREEN URINE: ABNORMAL
PHENCYCLIDINE SCREEN URINE: ABNORMAL
PROPOXYPHENE SCREEN, URINE: ABNORMAL
SYNTHETIC CANNABINOIDS(K2) SCREEN, URINE: ABNORMAL
THC SCREEN, URINE: ABNORMAL
TRAMADOL SCREEN URINE: ABNORMAL
TRICYCLIC ANTIDEPRESSANTS, UR: ABNORMAL

## 2021-03-18 PROCEDURE — 99214 OFFICE O/P EST MOD 30 MIN: CPT | Performed by: NURSE PRACTITIONER

## 2021-03-18 PROCEDURE — 80305 DRUG TEST PRSMV DIR OPT OBS: CPT | Performed by: NURSE PRACTITIONER

## 2021-03-18 RX ORDER — FLUTICASONE PROPIONATE 50 MCG
2 SPRAY, SUSPENSION (ML) NASAL DAILY
Qty: 1 BOTTLE | Refills: 3 | Status: SHIPPED | OUTPATIENT
Start: 2021-03-18 | End: 2022-06-30

## 2021-03-18 RX ORDER — DEXTROAMPHETAMINE SACCHARATE, AMPHETAMINE ASPARTATE MONOHYDRATE, DEXTROAMPHETAMINE SULFATE AND AMPHETAMINE SULFATE 3.75; 3.75; 3.75; 3.75 MG/1; MG/1; MG/1; MG/1
15 CAPSULE, EXTENDED RELEASE ORAL DAILY
Qty: 30 CAPSULE | Refills: 0 | OUTPATIENT
Start: 2021-03-18 | End: 2021-04-17

## 2021-03-18 RX ORDER — TERBINAFINE HYDROCHLORIDE 250 MG/1
250 TABLET ORAL DAILY
Qty: 30 TABLET | Refills: 0 | Status: SHIPPED | OUTPATIENT
Start: 2021-03-18 | End: 2021-12-01 | Stop reason: SDUPTHER

## 2021-03-18 SDOH — ECONOMIC STABILITY: INCOME INSECURITY: HOW HARD IS IT FOR YOU TO PAY FOR THE VERY BASICS LIKE FOOD, HOUSING, MEDICAL CARE, AND HEATING?: NOT HARD AT ALL

## 2021-03-18 SDOH — ECONOMIC STABILITY: FOOD INSECURITY: WITHIN THE PAST 12 MONTHS, THE FOOD YOU BOUGHT JUST DIDN'T LAST AND YOU DIDN'T HAVE MONEY TO GET MORE.: NEVER TRUE

## 2021-03-18 SDOH — ECONOMIC STABILITY: FOOD INSECURITY: WITHIN THE PAST 12 MONTHS, YOU WORRIED THAT YOUR FOOD WOULD RUN OUT BEFORE YOU GOT MONEY TO BUY MORE.: NEVER TRUE

## 2021-03-18 ASSESSMENT — PATIENT HEALTH QUESTIONNAIRE - PHQ9
1. LITTLE INTEREST OR PLEASURE IN DOING THINGS: 0
SUM OF ALL RESPONSES TO PHQ QUESTIONS 1-9: 0
SUM OF ALL RESPONSES TO PHQ QUESTIONS 1-9: 0
SUM OF ALL RESPONSES TO PHQ9 QUESTIONS 1 & 2: 0

## 2021-03-18 NOTE — TELEPHONE ENCOUNTER
Jenn Cardona called to request a refill on her medication. Last office visit : 3/18/2021   Next office visit : 7/7/2021     Last UDS:   Amphetamine Screen, Urine   Date Value Ref Range Status   03/18/2021 +  Final     Comment:     Pt should not be positive as we have not prescribed in a month or longer     Barbiturate Screen, Urine   Date Value Ref Range Status   03/18/2021 -  Final     Benzodiazepine Screen, Urine   Date Value Ref Range Status   03/18/2021 -  Final     Buprenorphine Urine   Date Value Ref Range Status   03/18/2021 -  Final     Cocaine Metabolite Screen, Urine   Date Value Ref Range Status   03/18/2021 -  Final     Gabapentin Screen, Urine   Date Value Ref Range Status   03/18/2021 -  Final     MDMA, Urine   Date Value Ref Range Status   03/18/2021 -  Final     Methamphetamine, Urine   Date Value Ref Range Status   03/18/2021 -  Final     Opiate Scrn, Ur   Date Value Ref Range Status   03/18/2021 -  Final     Oxycodone Screen, Ur   Date Value Ref Range Status   03/18/2021 -  Final     PCP Screen, Urine   Date Value Ref Range Status   03/18/2021 -  Final     Propoxyphene Screen, Urine   Date Value Ref Range Status   03/18/2021 -  Final     THC Screen, Urine   Date Value Ref Range Status   03/18/2021 -  Final     Tricyclic Antidepressants, Urine   Date Value Ref Range Status   03/18/2021 -  Final       Last Clois Richard: 3/18/2021  Medication Contract: 03/18/2021  Last Fill: 11/18/2020    Requested Prescriptions      No prescriptions requested or ordered in this encounter         Please approve or refuse this medication.    Wilner Christian

## 2021-03-18 NOTE — PROGRESS NOTES
Ear: Hearing, tympanic membrane, ear canal and external ear normal.      Nose: Nose normal.      Mouth/Throat:      Lips: Pink. Mouth: Mucous membranes are moist.      Pharynx: Oropharynx is clear. Eyes:      General: Lids are normal.      Extraocular Movements: Extraocular movements intact. Conjunctiva/sclera: Conjunctivae normal.      Pupils: Pupils are equal, round, and reactive to light. Neck:      Musculoskeletal: Full passive range of motion without pain, normal range of motion and neck supple. Thyroid: No thyromegaly. Trachea: Trachea normal.   Cardiovascular:      Rate and Rhythm: Normal rate and regular rhythm. Pulses: Normal pulses. Dorsalis pedis pulses are 2+ on the right side and 2+ on the left side. Posterior tibial pulses are 2+ on the right side and 2+ on the left side. Heart sounds: Normal heart sounds. No murmur. Pulmonary:      Effort: Pulmonary effort is normal.      Breath sounds: Normal breath sounds and air entry. Abdominal:      General: Bowel sounds are normal.      Palpations: Abdomen is soft. Musculoskeletal:      Cervical back: She exhibits normal range of motion. Thoracic back: She exhibits normal range of motion. Lumbar back: She exhibits normal range of motion. Right lower leg: No edema. Left lower leg: No edema. Lymphadenopathy:      Cervical: No cervical adenopathy. Skin:     General: Skin is warm and dry. Capillary Refill: Capillary refill takes less than 2 seconds. Neurological:      General: No focal deficit present. Mental Status: She is alert and oriented to person, place, and time. Mental status is at baseline. Psychiatric:         Attention and Perception: Attention normal.         Mood and Affect: Mood normal.         Speech: Speech normal.         Behavior: Behavior normal.         Thought Content:  Thought content normal.         Cognition and Memory: Cognition normal. Judgment: Judgment normal.         /78   Pulse 82   Temp 98.8 °F (37.1 °C) (Temporal)   Resp 18   Ht 5' 7.5\" (1.715 m)   Wt 196 lb (88.9 kg)   SpO2 98%   BMI 30.24 kg/m²     Assessment:      Diagnosis Orders   1. Anxiety and depression     2. Drug therapy  POCT Rapid Drug Screen   3. Adult ADHD (attention deficit hyperactivity disorder)     4. Esophageal dysphagia     5. Globus sensation         Results for orders placed or performed in visit on 21   POCT Rapid Drug Screen   Result Value Ref Range    Alcohol, Urine -     Amphetamine Screen, Urine +     Barbiturate Screen, Urine -     Benzodiazepine Screen, Urine -     Buprenorphine Urine -     Cocaine Metabolite Screen, Urine -     FENTANYL SCREEN, URINE -     Gabapentin Screen, Urine -     MDMA, Urine -     Methadone Screen, Urine -     Methamphetamine, Urine -     Opiate Scrn, Ur -     Oxycodone Screen, Ur -     PCP Screen, Urine -     Propoxyphene Screen, Urine -     Synthetic Cannabinoids (K2) Screen, Urine -     THC Screen, Urine -     Tramadol Scrn, Ur -     Tricyclic Antidepressants, Urine -        Plan: We will continue her ADHD meds as prescribed. Lamisil for fungal infection of the toenail. Discussed globus sensation. I am wanting her to have labs including a thyroid level. If it does return abnormal we will plan for ultrasound of the thyroid. We discussed possible referral to GI if thyroid levels returned completely normal.    Return in about 3 months (around 2021) for Office Visit, Refill with UDS.     Orders Placed This Encounter   Procedures    POCT Rapid Drug Screen       Orders Placed This Encounter   Medications    terbinafine (LAMISIL) 250 MG tablet     Sig: Take 1 tablet by mouth daily     Dispense:  30 tablet     Refill:  0    fluticasone (FLONASE) 50 MCG/ACT nasal spray     Si sprays by Nasal route daily     Dispense:  1 Bottle     Refill:  3    ciclopirox (PENLAC) 8 % solution     Sig: Apply topically greater than 8 hours before showering. Clean nails with alcohol weekly. Dispense:  1 Bottle     Refill:  3        Patient offered educational handouts and has had all questions answered. Patient voices understanding and agrees to plans along with risks and benefits of plan. Patient is instructed to continue prior meds, diet, and exercise plans as instructed. Patient agrees to follow up as instructed and sooner if needed. Patient agrees to go to ER if condition becomes emergent. EMR Dragon/transcription disclaimer: Some of this encounter note is an electronic transcription/translation of spoken language to printed text. The electronic translation of spoken language may permit erroneous, or at times, nonsensical words or phrases to be inadvertently transcribed.  Although I have reviewed the note for such errors, some may still exist.    Electronically signed by MICAELA Elliott on 3/19/2021 at 2:23 PM

## 2021-03-19 ASSESSMENT — ENCOUNTER SYMPTOMS
GASTROINTESTINAL NEGATIVE: 1
EYES NEGATIVE: 1
TROUBLE SWALLOWING: 1
RESPIRATORY NEGATIVE: 1

## 2021-03-19 NOTE — TELEPHONE ENCOUNTER
Will leave up to [facility] as I do have some patients that stop the med on weekends or other reasons but of course we do worry about ilicit use.   i'll sign if Dimitri Loomis thinks we need to

## 2021-03-22 DIAGNOSIS — F90.9 ADULT ADHD (ATTENTION DEFICIT HYPERACTIVITY DISORDER): ICD-10-CM

## 2021-03-22 DIAGNOSIS — R73.09 ELEVATED GLUCOSE: ICD-10-CM

## 2021-03-22 DIAGNOSIS — Z13.220 LIPID SCREENING: ICD-10-CM

## 2021-03-22 LAB
ALBUMIN SERPL-MCNC: 4.5 G/DL (ref 3.5–5.2)
ALP BLD-CCNC: 62 U/L (ref 35–104)
ALT SERPL-CCNC: 14 U/L (ref 5–33)
ANION GAP SERPL CALCULATED.3IONS-SCNC: 14 MMOL/L (ref 7–19)
AST SERPL-CCNC: 19 U/L (ref 5–32)
BASOPHILS ABSOLUTE: 0 K/UL (ref 0–0.2)
BASOPHILS RELATIVE PERCENT: 0.5 % (ref 0–1)
BILIRUB SERPL-MCNC: 0.7 MG/DL (ref 0.2–1.2)
BUN BLDV-MCNC: 11 MG/DL (ref 6–20)
CALCIUM SERPL-MCNC: 9.1 MG/DL (ref 8.6–10)
CHLORIDE BLD-SCNC: 103 MMOL/L (ref 98–111)
CHOLESTEROL, FASTING: 144 MG/DL (ref 160–199)
CO2: 25 MMOL/L (ref 22–29)
CREAT SERPL-MCNC: 0.5 MG/DL (ref 0.5–0.9)
EOSINOPHILS ABSOLUTE: 0 K/UL (ref 0–0.6)
EOSINOPHILS RELATIVE PERCENT: 0.6 % (ref 0–5)
GFR AFRICAN AMERICAN: >59
GFR NON-AFRICAN AMERICAN: >60
GLUCOSE BLD-MCNC: 77 MG/DL (ref 74–109)
HBA1C MFR BLD: 4.9 % (ref 4–6)
HCT VFR BLD CALC: 40.4 % (ref 37–47)
HDLC SERPL-MCNC: 68 MG/DL (ref 65–121)
HEMOGLOBIN: 13.3 G/DL (ref 12–16)
IMMATURE GRANULOCYTES #: 0 K/UL
LDL CHOLESTEROL CALCULATED: 64 MG/DL
LYMPHOCYTES ABSOLUTE: 2.5 K/UL (ref 1.1–4.5)
LYMPHOCYTES RELATIVE PERCENT: 38.9 % (ref 20–40)
MCH RBC QN AUTO: 31.8 PG (ref 27–31)
MCHC RBC AUTO-ENTMCNC: 32.9 G/DL (ref 33–37)
MCV RBC AUTO: 96.7 FL (ref 81–99)
MONOCYTES ABSOLUTE: 0.6 K/UL (ref 0–0.9)
MONOCYTES RELATIVE PERCENT: 9.9 % (ref 0–10)
NEUTROPHILS ABSOLUTE: 3.2 K/UL (ref 1.5–7.5)
NEUTROPHILS RELATIVE PERCENT: 49.8 % (ref 50–65)
PDW BLD-RTO: 12.2 % (ref 11.5–14.5)
PLATELET # BLD: 251 K/UL (ref 130–400)
PMV BLD AUTO: 9.5 FL (ref 9.4–12.3)
POTASSIUM SERPL-SCNC: 4 MMOL/L (ref 3.5–5)
RBC # BLD: 4.18 M/UL (ref 4.2–5.4)
SODIUM BLD-SCNC: 142 MMOL/L (ref 136–145)
TOTAL PROTEIN: 7.1 G/DL (ref 6.6–8.7)
TRIGLYCERIDE, FASTING: 59 MG/DL (ref 0–149)
TSH REFLEX FT4: 2.43 UIU/ML (ref 0.35–5.5)
WBC # BLD: 6.4 K/UL (ref 4.8–10.8)

## 2021-03-22 RX ORDER — DEXTROAMPHETAMINE SACCHARATE, AMPHETAMINE ASPARTATE MONOHYDRATE, DEXTROAMPHETAMINE SULFATE AND AMPHETAMINE SULFATE 3.75; 3.75; 3.75; 3.75 MG/1; MG/1; MG/1; MG/1
15 CAPSULE, EXTENDED RELEASE ORAL DAILY
Qty: 30 CAPSULE | Refills: 0 | Status: SHIPPED | OUTPATIENT
Start: 2021-03-22 | End: 2021-04-23 | Stop reason: SDUPTHER

## 2021-03-25 ENCOUNTER — TELEPHONE (OUTPATIENT)
Dept: PRIMARY CARE CLINIC | Age: 31
End: 2021-03-25

## 2021-03-25 DIAGNOSIS — R09.89 GLOBUS SENSATION: Primary | ICD-10-CM

## 2021-03-25 NOTE — TELEPHONE ENCOUNTER
----- Message from MICAELA Reyes sent at 3/24/2021  3:33 PM CDT -----  Referral to GI based on globus sensation

## 2021-03-25 NOTE — TELEPHONE ENCOUNTER
I attempted to reach pt to notify her of referral and left detailed message on verified VM for pt she was instructed to call back with any questions.

## 2021-04-01 ENCOUNTER — VIRTUAL VISIT (OUTPATIENT)
Dept: GASTROENTEROLOGY | Age: 31
End: 2021-04-01
Payer: COMMERCIAL

## 2021-04-01 DIAGNOSIS — R13.10 DYSPHAGIA, UNSPECIFIED TYPE: Primary | ICD-10-CM

## 2021-04-01 DIAGNOSIS — R12 HEARTBURN: ICD-10-CM

## 2021-04-01 DIAGNOSIS — R09.89 GLOBUS SENSATION: ICD-10-CM

## 2021-04-01 PROCEDURE — 99204 OFFICE O/P NEW MOD 45 MIN: CPT | Performed by: NURSE PRACTITIONER

## 2021-04-01 RX ORDER — OMEPRAZOLE 40 MG/1
40 CAPSULE, DELAYED RELEASE ORAL DAILY
Qty: 30 CAPSULE | Refills: 2 | Status: SHIPPED | OUTPATIENT
Start: 2021-04-01 | End: 2021-07-08

## 2021-04-01 ASSESSMENT — ENCOUNTER SYMPTOMS
CHOKING: 0
ABDOMINAL PAIN: 0
SHORTNESS OF BREATH: 0
TROUBLE SWALLOWING: 1
ANAL BLEEDING: 0
RECTAL PAIN: 0
CONSTIPATION: 0
NAUSEA: 0
BLOOD IN STOOL: 0
ABDOMINAL DISTENTION: 0
VOMITING: 0
COUGH: 0
DIARRHEA: 0

## 2021-04-01 NOTE — PROGRESS NOTES
FLUoxetine (PROZAC) 20 MG capsule Take 1 capsule by mouth daily  MICAELA Jacobs   cloNIDine (CATAPRES) 0.1 MG tablet TAKE 1 TABLET BY MOUTH EVERY NIGHT AS NEEDED FOR INSOMNIA  MICAELA Jacobs   amphetamine-dextroamphetamine (ADDERALL XR) 15 MG extended release capsule Take 1 capsule by mouth daily for 30 days. Howard Castellanos MD   diclofenac sodium (VOLTAREN) 1 % GEL Apply 2 g topically 4 times daily  MICAELA Jacobs   naproxen (NAPROSYN) 500 MG tablet Take 1 tablet by mouth 2 times daily as needed for Pain  MICAELA Mcgovern   CRANBERRY EXTRACT PO Take by mouth  Historical Provider, MD       Social History     Tobacco Use    Smoking status: Never Smoker    Smokeless tobacco: Never Used   Substance Use Topics    Alcohol use: Yes    Drug use: No        Allergies   Allergen Reactions    Keflex [Cephalexin] Hives   ,   Past Medical History:   Diagnosis Date    Anxiety and depression 1/20/2019    Anxiety and depression    ,   Past Surgical History:   Procedure Laterality Date    CHOLECYSTECTOMY     ,   Social History     Tobacco Use    Smoking status: Never Smoker    Smokeless tobacco: Never Used   Substance Use Topics    Alcohol use:  Yes    Drug use: No   ,   Family History   Problem Relation Age of Onset    Diabetes Maternal Grandmother     Cancer Paternal Grandfather        PHYSICAL EXAMINATION:  [ INSTRUCTIONS:  \"[x]\" Indicates a positive item  \"[]\" Indicates a negative item  -- DELETE ALL ITEMS NOT EXAMINED]  Vital Signs: (As obtained by patient/caregiver or practitioner observation)    Blood pressure-  Heart rate-    Respiratory rate-    Temperature-  Pulse oximetry-     Constitutional: [x] Appears well-developed and well-nourished [x] No apparent distress      [] Abnormal-   Mental status  [x] Alert and awake  [x] Oriented to person/place/time [x]Able to follow commands      Eyes:  EOM    [x]  Normal  [] Abnormal-  Sclera  [x]  Normal  [] Abnormal -         Discharge [x]  None visible The patient (or guardian if applicable) is aware that this is a billable service. Verbal consent to proceed has been obtained within the past 12 months. The visit was conducted pursuant to the emergency declaration under the [address] [place] [address], [address] [place] [address] authority and the [employer] Act. Patient identification was verified, and a caregiver was present when appropriate. The patient was located in a state where the provider was credentialed to provide care. Total time spent on this encounter: Not billed by time    --MICAELA Keyes NP on 4/1/2021 at 3:34 PM    An electronic signature was used to authenticate this note.

## 2021-04-21 DIAGNOSIS — F90.9 ADULT ADHD (ATTENTION DEFICIT HYPERACTIVITY DISORDER): ICD-10-CM

## 2021-04-22 NOTE — TELEPHONE ENCOUNTER
[employer] can you take a look at this and see if you think it's appropriate as some pts do just week days w/ the meds and it can last longer but I don't know the pt?

## 2021-04-23 RX ORDER — DEXTROAMPHETAMINE SACCHARATE, AMPHETAMINE ASPARTATE MONOHYDRATE, DEXTROAMPHETAMINE SULFATE AND AMPHETAMINE SULFATE 3.75; 3.75; 3.75; 3.75 MG/1; MG/1; MG/1; MG/1
15 CAPSULE, EXTENDED RELEASE ORAL DAILY
Qty: 30 CAPSULE | Refills: 0 | Status: SHIPPED | OUTPATIENT
Start: 2021-04-23 | End: 2021-06-03 | Stop reason: SDUPTHER

## 2021-04-23 NOTE — TELEPHONE ENCOUNTER
It was appropriate. She had some left over from her previous script and had taken it prior to the appointment.

## 2021-05-01 ENCOUNTER — OFFICE VISIT (OUTPATIENT)
Age: 31
End: 2021-05-01

## 2021-05-01 VITALS — HEART RATE: 72 BPM | TEMPERATURE: 98.1 F | OXYGEN SATURATION: 99 %

## 2021-05-01 DIAGNOSIS — Z11.59 SCREENING FOR VIRAL DISEASE: Primary | ICD-10-CM

## 2021-05-01 PROCEDURE — 99999 PR OFFICE/OUTPT VISIT,PROCEDURE ONLY: CPT | Performed by: NURSE PRACTITIONER

## 2021-05-03 LAB — SARS-COV-2, PCR: NOT DETECTED

## 2021-05-04 ENCOUNTER — HOSPITAL ENCOUNTER (OUTPATIENT)
Age: 31
Setting detail: SPECIMEN
Discharge: HOME | End: 2021-05-04
Payer: COMMERCIAL

## 2021-05-04 ENCOUNTER — ANESTHESIA (OUTPATIENT)
Dept: OPERATING ROOM | Age: 31
End: 2021-05-04

## 2021-05-04 ENCOUNTER — HOSPITAL ENCOUNTER (OUTPATIENT)
Age: 31
Setting detail: OUTPATIENT SURGERY
Discharge: HOME | End: 2021-05-04
Attending: INTERNAL MEDICINE | Admitting: INTERNAL MEDICINE
Payer: COMMERCIAL

## 2021-05-04 ENCOUNTER — TELEPHONE (OUTPATIENT)
Dept: GASTROENTEROLOGY | Age: 31
End: 2021-05-04

## 2021-05-04 ENCOUNTER — APPOINTMENT (OUTPATIENT)
Dept: OPERATING ROOM | Age: 31
End: 2021-05-04

## 2021-05-04 ENCOUNTER — ANESTHESIA EVENT (OUTPATIENT)
Dept: OPERATING ROOM | Age: 31
End: 2021-05-04

## 2021-05-04 ENCOUNTER — OFFICE VISIT (OUTPATIENT)
Dept: URGENT CARE | Age: 31
End: 2021-05-04
Payer: COMMERCIAL

## 2021-05-04 VITALS
WEIGHT: 196 LBS | BODY MASS INDEX: 29.7 KG/M2 | TEMPERATURE: 97.3 F | DIASTOLIC BLOOD PRESSURE: 76 MMHG | OXYGEN SATURATION: 99 % | SYSTOLIC BLOOD PRESSURE: 113 MMHG | HEIGHT: 68 IN | RESPIRATION RATE: 16 BRPM | HEART RATE: 74 BPM

## 2021-05-04 VITALS — OXYGEN SATURATION: 97 % | SYSTOLIC BLOOD PRESSURE: 108 MMHG | DIASTOLIC BLOOD PRESSURE: 73 MMHG

## 2021-05-04 VITALS
BODY MASS INDEX: 29.7 KG/M2 | RESPIRATION RATE: 14 BRPM | HEIGHT: 68 IN | WEIGHT: 196 LBS | DIASTOLIC BLOOD PRESSURE: 65 MMHG | TEMPERATURE: 97.6 F | SYSTOLIC BLOOD PRESSURE: 113 MMHG | HEART RATE: 66 BPM | OXYGEN SATURATION: 99 %

## 2021-05-04 DIAGNOSIS — N94.9 VAGINAL BURNING: ICD-10-CM

## 2021-05-04 DIAGNOSIS — N76.0 ACUTE VAGINITIS: Primary | ICD-10-CM

## 2021-05-04 DIAGNOSIS — N89.8 VAGINAL DISCHARGE: ICD-10-CM

## 2021-05-04 LAB
APPEARANCE FLUID: ABNORMAL
BILIRUBIN, POC: ABNORMAL
BLOOD URINE, POC: ABNORMAL
CLARITY, POC: CLEAR
COLOR, POC: YELLOW
GLUCOSE URINE, POC: ABNORMAL
KETONES, POC: ABNORMAL
LEUKOCYTE EST, POC: ABNORMAL
NITRITE, POC: ABNORMAL
PH, POC: 7
PROTEIN, POC: ABNORMAL
SPECIFIC GRAVITY, POC: 1.02
UROBILINOGEN, POC: 0.2

## 2021-05-04 PROCEDURE — 43450 DILATE ESOPHAGUS 1/MULT PASS: CPT | Performed by: INTERNAL MEDICINE

## 2021-05-04 PROCEDURE — G8918 PT W/O PREOP ORDER IV AB PRO: HCPCS

## 2021-05-04 PROCEDURE — 88305 TISSUE EXAM BY PATHOLOGIST: CPT

## 2021-05-04 PROCEDURE — 43450 DILATE ESOPHAGUS 1/MULT PASS: CPT

## 2021-05-04 PROCEDURE — 99213 OFFICE O/P EST LOW 20 MIN: CPT | Performed by: NURSE PRACTITIONER

## 2021-05-04 PROCEDURE — 43239 EGD BIOPSY SINGLE/MULTIPLE: CPT | Performed by: INTERNAL MEDICINE

## 2021-05-04 PROCEDURE — 88342 IMHCHEM/IMCYTCHM 1ST ANTB: CPT

## 2021-05-04 PROCEDURE — 81002 URINALYSIS NONAUTO W/O SCOPE: CPT | Performed by: NURSE PRACTITIONER

## 2021-05-04 PROCEDURE — G8907 PT DOC NO EVENTS ON DISCHARG: HCPCS

## 2021-05-04 PROCEDURE — 43239 EGD BIOPSY SINGLE/MULTIPLE: CPT

## 2021-05-04 RX ORDER — SODIUM CHLORIDE 9 MG/ML
INJECTION, SOLUTION INTRAVENOUS CONTINUOUS
Status: DISCONTINUED | OUTPATIENT
Start: 2021-05-04 | End: 2021-05-04 | Stop reason: HOSPADM

## 2021-05-04 RX ORDER — LIDOCAINE HYDROCHLORIDE 10 MG/ML
INJECTION, SOLUTION INFILTRATION; PERINEURAL PRN
Status: DISCONTINUED | OUTPATIENT
Start: 2021-05-04 | End: 2021-05-04 | Stop reason: SDUPTHER

## 2021-05-04 RX ORDER — PROPOFOL 10 MG/ML
INJECTION, EMULSION INTRAVENOUS PRN
Status: DISCONTINUED | OUTPATIENT
Start: 2021-05-04 | End: 2021-05-04 | Stop reason: SDUPTHER

## 2021-05-04 RX ORDER — FLUCONAZOLE 150 MG/1
150 TABLET ORAL
Qty: 3 TABLET | Refills: 0 | Status: SHIPPED | OUTPATIENT
Start: 2021-05-04 | End: 2021-05-11

## 2021-05-04 RX ADMIN — LIDOCAINE HYDROCHLORIDE 40 MG: 10 INJECTION, SOLUTION INFILTRATION; PERINEURAL at 11:43

## 2021-05-04 RX ADMIN — SODIUM CHLORIDE: 9 INJECTION, SOLUTION INTRAVENOUS at 10:33

## 2021-05-04 RX ADMIN — PROPOFOL 400 MG: 10 INJECTION, EMULSION INTRAVENOUS at 11:43

## 2021-05-04 NOTE — PROGRESS NOTES
[address] [facility]  [address] [address] [place] [address]  Dept: [phone]  Dept Fax: [phone]  Loc: [phone]    Gertrudis Brooke is a 32 y.o. female who presents today for her medical conditions/complaintsas noted below. Gertrudis Brooke is c/o of Vaginal Pain (itchy and burning all the time, ongoing for over a week, some odor, white discharge)        HPI:     HPI  Arianna Marshall presents today with vaginal burning. This has been present on and off for about a week. At times there is itching. Burning noted after urination and sex. She reports she is not concerned for STD. She has noticed some odor. Discharge is thick white and cottage cheese like. No recent antibiotics or medication changes. Does have history of BV and uti. Past Medical History:   Diagnosis Date    Anxiety and depression 1/20/2019    Anxiety and depression      Past Surgical History:   Procedure Laterality Date    CHOLECYSTECTOMY         Family History   Problem Relation Age of Onset    Diabetes Maternal Grandmother     Cancer Paternal Grandfather        Social History     Tobacco Use    Smoking status: Never Smoker    Smokeless tobacco: Never Used   Substance Use Topics    Alcohol use: Yes      Current Outpatient Medications   Medication Sig Dispense Refill    fluconazole (DIFLUCAN) 150 MG tablet Take 1 tablet by mouth every 72 hours for 3 doses 3 tablet 0    amphetamine-dextroamphetamine (ADDERALL XR) 15 MG extended release capsule Take 1 capsule by mouth daily for 30 days. 30 capsule 0    omeprazole (PRILOSEC) 40 MG delayed release capsule Take 1 capsule by mouth daily Take first thing daily on an empty stomach. 30 capsule 2    fluticasone (FLONASE) 50 MCG/ACT nasal spray 2 sprays by Nasal route daily 1 Bottle 3    ciclopirox (PENLAC) 8 % solution Apply topically greater than 8 hours before showering. Clean nails with alcohol weekly.  1 Bottle 3    FLUoxetine (PROZAC) 20 MG capsule Take 1 capsule by mouth daily 30 capsule 3    cloNIDine (CATAPRES) 0.1 MG tablet TAKE 1 TABLET BY MOUTH EVERY NIGHT AS NEEDED FOR INSOMNIA 90 tablet 0    diclofenac sodium (VOLTAREN) 1 % GEL Apply 2 g topically 4 times daily 2 Tube 1    naproxen (NAPROSYN) 500 MG tablet Take 1 tablet by mouth 2 times daily as needed for Pain 60 tablet 0    CRANBERRY EXTRACT PO Take by mouth      amphetamine-dextroamphetamine (ADDERALL XR) 15 MG extended release capsule Take 1 capsule by mouth daily for 30 days. 30 capsule 0     No current facility-administered medications for this visit. Allergies   Allergen Reactions    Keflex [Cephalexin] Hives       Health Maintenance   Topic Date Due    Hepatitis C screen  Never done    Varicella vaccine (1 of 2 - 2-dose childhood series) Never done    COVID-19 Vaccine (1) Never done    DTaP/Tdap/Td vaccine (1 - Tdap) Never done    Cervical cancer screen  Never done    Flu vaccine (Season Ended) 09/01/2021    HIV screen  Completed    Hepatitis A vaccine  Aged Out    Hepatitis B vaccine  Aged Out    Hib vaccine  Aged Out    Meningococcal (ACWY) vaccine  Aged Out    Pneumococcal 0-64 years Vaccine  Aged Out       Subjective:     Review of Systems   Genitourinary: Positive for vaginal discharge. Vaginal itching  Vaginal burning          :Objective      Physical Exam  Vitals signs and nursing note reviewed. Constitutional:       General: She is not in acute distress. Appearance: Normal appearance. She is well-developed. She is not ill-appearing or diaphoretic. HENT:      Head: Normocephalic and atraumatic. Eyes:      Conjunctiva/sclera: Conjunctivae normal.      Pupils: Pupils are equal, round, and reactive to light. Neck:      Musculoskeletal: Normal range of motion. Cardiovascular:      Rate and Rhythm: Normal rate and regular rhythm. Heart sounds: Normal heart sounds. No murmur.    Pulmonary:      Effort: Pulmonary effort is normal. No respiratory distress. Breath sounds: Normal breath sounds. No wheezing. Genitourinary:     Comments: Deferred     Skin:     General: Skin is warm and dry. Findings: No rash. Neurological:      Mental Status: She is alert and oriented to person, place, and time. Psychiatric:         Mood and Affect: Mood normal.         Behavior: Behavior normal.       /76   Pulse 74   Temp 97.3 °F (36.3 °C) (Temporal)   Resp 16   Ht 5' 8\" (1.727 m)   Wt 196 lb (88.9 kg)   SpO2 99%   BMI 29.80 kg/m²     :Assessment       Diagnosis Orders   1. Vaginal burning  POCT Urinalysis no Micro   2. Vaginal discharge  fluconazole (DIFLUCAN) 150 MG tablet       :Plan    1. Diflucan as prescribed   2. diatherix swab - we will call with results and further treatment based on results   3. Follow up with PCP/GYN as needed   Orders Placed This Encounter   Procedures    POCT Urinalysis no Micro     Results for orders placed or performed in visit on 05/04/21   POCT Urinalysis no Micro   Result Value Ref Range    Color, UA yellow     Clarity, UA clear     Glucose, UA POC neg     Bilirubin, UA neg     Ketones, UA neg     Spec Grav, UA 1.025     Blood, UA POC neg     pH, UA 7.0     Protein, UA POC trace (A)     Urobilinogen, UA 0.2     Leukocytes, UA neg     Nitrite, UA neg     Appearance, Fluid           No follow-ups on file. Orders Placed This Encounter   Medications    fluconazole (DIFLUCAN) 150 MG tablet     Sig: Take 1 tablet by mouth every 72 hours for 3 doses     Dispense:  3 tablet     Refill:  0       Patient given educational materials- see patient instructions. Discussed use, benefit, and side effects of prescribedmedications. All patient questions answered. Pt voiced understanding. Patient Instructions       Patient Education        Vaginal Yeast Infection: Care Instructions  Overview     A vaginal yeast infection is the growth of too many yeast cells in the vagina. This is common in women of all ages.  Itching, vaginal discharge and irritation, and other symptoms can bother you. But yeast infections don't often cause other health problems. Some medicines can increase your risk of getting a yeast infection. These include antibiotics, birth control pills, hormones, and steroids. You may also be more likely to get a yeast infection if you are pregnant, have diabetes, douche, or wear tight clothes. With treatment, most yeast infections get better in 2 to 3 days. Follow-up care is a key part of your treatment and safety. Be sure to make and go to all appointments, and call your doctor if you are having problems. It's also a good idea to know your test results and keep a list of the medicines you take. How can you care for yourself at home? · Take your medicines exactly as prescribed. Call your doctor if you think you are having a problem with your medicine. · Ask your doctor about over-the-counter (OTC) medicines for yeast infections. They may cost less than prescription medicines. If you use an OTC treatment, read and follow all instructions on the label. · Don't use tampons while using a vaginal cream or suppository. The tampons can absorb the medicine. Use pads instead. · Wear loose cotton clothing. Don't wear nylon or other fabric that holds body heat and moisture close to the skin. · Try sleeping without underwear. · Don't scratch. Relieve itching with a cold pack or a cool bath. · Don't wash your vaginal area more than once a day. Use plain water or a mild, unscented soap. Air-dry the vaginal area. · Change out of wet swimsuits after swimming. · If you are using a vaginal medicine, don't have sex until you have finished your treatment. But if you do have sex, don't depend on a condom or diaphragm for birth control. The oil in some vaginal medicines weakens latex. · Don't douche. When should you call for help?    Call your doctor now or seek immediate medical care if:    · You have unexpected vaginal results and keep a list of the medicines you take. How can you care for yourself at home? · If your doctor prescribed antibiotics, take them as directed. Do not stop taking them just because you feel better. You need to take the full course of antibiotics. · Take your medicines exactly as prescribed. Call your doctor if you think you are having a problem with your medicine. · Do not eat or drink anything that has alcohol if you are taking metronidazole (Flagyl). · If you have a yeast infection, use over-the-counter products as your doctor tells you to. Or take medicine your doctor prescribes exactly as directed. · Wash your vaginal area daily with water. You also can use a mild, unscented soap if you want. · Do not use scented bath products. And do not use vaginal sprays or douches. · Put a washcloth soaked in cool water on the area to relieve itching. Or you can take cool baths. · If you have dryness because of menopause, use estrogen cream or pills that your doctor prescribes. · Ask your doctor about when it is okay to have sex. · Use a personal lubricant before sex if you have dryness. Examples are Astroglide, K-Y Jelly, and Wet Lubricant Gel. · Ask your doctor if your sex partner also needs treatment. When should you call for help? Call your doctor now or seek immediate medical care if:    · You have a fever and pelvic pain. Watch closely for changes in your health, and be sure to contact your doctor if:    · You have bleeding other than your period.     · You do not get better as expected. Where can you learn more? Go to [email or web address] and sign in to your [employer] account. Enter Z484 in the [facility] box to learn more about \"Vaginitis: Care Instructions. \"     If you do not have an account, please click on the \"Sign Up Now\" link. Current as of: July 17, 2020               Content Version: 12.8  © 6604-2181 Healthwise, Incorporated.    Care instructions adapted under license by [facility] ([facility]). If you have questions about a medical condition or this instruction, always ask your healthcare professional. [address] [address] any warranty or liability for your use of this information. 1. Diflucan as prescribed   2. diatherix swab - we will call with results and further treatment based on results   3.  Follow up with PCP/GYN as needed         Electronically signed by MICAELA Bingham on 5/4/2021 at 8:34 AM

## 2021-05-04 NOTE — ANESTHESIA POSTPROCEDURE EVALUATION
Department of Anesthesiology  Postprocedure Note    Patient: Анна Oconnor  MRN: [id number]  YOB: 1990  Date of evaluation: 5/4/2021  Time:  11:52 AM     Procedure Summary     Date: 05/04/21 Room / Location: [facility] 02 / [address] [place] [address]    Anesthesia Start: 1138 Anesthesia Stop:     Procedure: EGD ESOPHAGOGASTRODUODENOSCOPY (N/A Esophagus) Diagnosis: (DYSPHAGIA)    Surgeons: Keely Osman MD Responsible Provider: MICAELA Mcduffie CRNA    Anesthesia Type: general, TIVA ASA Status: 1          Anesthesia Type: No value filed. Ike Phase I:      Ike Phase II:      Last vitals: Reviewed and per EMR flowsheets.        Anesthesia Post Evaluation    Patient location during evaluation: bedside  Patient participation: complete - patient participated  Level of consciousness: sleepy but conscious  Pain score: 0  Airway patency: patent  Nausea & Vomiting: no nausea and no vomiting  Complications: no  Cardiovascular status: blood pressure returned to baseline  Respiratory status: acceptable, room air and spontaneous ventilation  Hydration status: euvolemic

## 2021-05-04 NOTE — TELEPHONE ENCOUNTER
05-04-21 Called [facility] for the patient to return a message to the office    RE: needs 4-6 F/U apt after EGD.  Cv

## 2021-05-04 NOTE — OP NOTE
Endoscopic Procedure Note    Patient: Deepak Jagdeep: 1990  Med Rec#: [id number] Acc#: [id number]     Primary Care Provider MICAELA Mccord    Endoscopist: Osmar Field MD    Date of Procedure:  5/4/2021    Procedure:   1. EGD with Ruff bougie dilation of the esophagus and cold biopsies    Indications:   1. Dysphagia, unspecified type  2. Heartburn  3. Globus sensation    Anesthesia:  Sedation was administered by anesthesia who monitored the patient during the procedure. Estimated Blood Loss: minimal    Procedure:   After reviewing the patient's chart and obtaining informed consent, the patient was placed in the left lateral decubitus position. A forward-viewing Olympus endoscope was lubricated and inserted through the mouth into the oropharynx. Under direct visualization, the upper esophagus was intubated. The scope was advanced to the level of the third portion of duodenum. Scope was slowly withdrawn with careful inspection of the mucosal surfaces. The scope was retroflexed for inspection of the gastric fundus and incisura. Findings and maneuvers are listed in impression below. Next, a lubricated Ruff weighted Bougie dilator-54 Fr was gently introduced into the patient's mouth and passed into the Esophagus and into the proximal stomach without much resistance and then withdrawn. Repeat EGD was performed to verify dilation and scope tip was passed into the stomach. NO evidence of perforation or excessive bleeding was noted subsequent to the dilation. The patient tolerated the procedure well. The scope was removed. There were no immediate complications. Findings/IMPRESSION:  Esophagus: normal upper two thirds. A partially obstructing lower esophageal ring was seen near the EG junction at 38 cm and was subjected to successful bougie dilation as described above.     NO erosions or ulcers or nodules or strictures or webs or mass lesions or extrinsic compression or diverticula noted. Random cold biopsies were taken to check for EoE and NERD. There is no obvious hiatal hernia present. Stomach:  abnormal: Patchy mucosal changes with linear erythema and small 1 to 2 mm erosions in the antrum suggestive of chemical gastritis noted -  Gastric biopsies were taken from the antrum and body to rule out Helicobacter pylori infection. NO ulcers or masses or gastric outlet obstruction or retained food or fluid. Rugae were normal and lumen distended well with insufflation. Retroflexed views otherwise revealed a normal GE junction, fundus and cardia as well. Duodenum: normal       RECOMMENDATIONS:    1. Await path results, the patient will be contacted in 7-10 days with biopsy results. 2.  Magic mouthwash 5 ml PO Swish and swallow q3h PRN ONLY IF patient has post-procedural sorethroat or chest pain. 3. Full liquids to soft diet today barbara discharge from the surgicenter; may advance  diet starting in AM tomorrow. 4. May resume other meds except any ASA/NSAIDs; may use cough drops or lozenges PRN; also OTC/prescription PPI or H2RA PO qday or BID with anti-GERD measures. 5. NO ASA/NSAIDs x 2 weeks  6. OP f/u in 4-6 weeks; will consider an Esophageal manometry later if the patient's dysphagia persists. The results were discussed with the patient and family. A copy of the images obtained were given to the patient.      Jose E Victor MD  5/4/2021  10:32 AM

## 2021-05-04 NOTE — ANESTHESIA PRE PROCEDURE
Department of Anesthesiology  Preprocedure Note       Name:  José Miguel Burnette   Age:  32 y.o.  :  1990                                          MRN:  [id number]         Date:  2021      Surgeon: Corinthia Goodpasture):  Shelby Silva MD    Procedure: Procedure(s):  EGD ESOPHAGOGASTRODUODENOSCOPY    Medications prior to admission:   Prior to Admission medications    Medication Sig Start Date End Date Taking? Authorizing Provider   fluconazole (DIFLUCAN) 150 MG tablet Take 1 tablet by mouth every 72 hours for 3 doses 21  MICAELA Webster   amphetamine-dextroamphetamine (ADDERALL XR) 15 MG extended release capsule Take 1 capsule by mouth daily for 30 days. 21  Rocio Self MD   omeprazole (PRILOSEC) 40 MG delayed release capsule Take 1 capsule by mouth daily Take first thing daily on an empty stomach. 21   MICAELA Serrato - NP   fluticasone [facility]) 50 MCG/ACT nasal spray 2 sprays by Nasal route daily 3/18/21   MICAELA Horne   ciclopirox [facility]) 8 % solution Apply topically greater than 8 hours before showering. Clean nails with alcohol weekly. 3/18/21   MICAELA Horne   FLUoxetine (PROZAC) 20 MG capsule Take 1 capsule by mouth daily 3/3/21   MICAELA Horne   cloNIDine (CATAPRES) 0.1 MG tablet TAKE 1 TABLET BY MOUTH EVERY NIGHT AS NEEDED FOR INSOMNIA 21   MICAELA Horne   amphetamine-dextroamphetamine (ADDERALL XR) 15 MG extended release capsule Take 1 capsule by mouth daily for 30 days. 10/13/20 11/12/20  Rocio Self MD   diclofenac sodium (VOLTAREN) 1 % GEL Apply 2 g topically 4 times daily 20   MICAELA Horne   naproxen (NAPROSYN) 500 MG tablet Take 1 tablet by mouth 2 times daily as needed for Pain 2/97/15   MICAELA Welch   CRANBERRY EXTRACT PO Take by mouth    Historical Provider, MD       Current medications:    No current facility-administered medications for this encounter.       Current Outpatient Medications   Medication Sig Dispense Refill    fluconazole (DIFLUCAN) 150 MG tablet Take 1 tablet by mouth every 72 hours for 3 doses 3 tablet 0    amphetamine-dextroamphetamine (ADDERALL XR) 15 MG extended release capsule Take 1 capsule by mouth daily for 30 days. 30 capsule 0    omeprazole (PRILOSEC) 40 MG delayed release capsule Take 1 capsule by mouth daily Take first thing daily on an empty stomach. 30 capsule 2    fluticasone (FLONASE) 50 MCG/ACT nasal spray 2 sprays by Nasal route daily 1 Bottle 3    ciclopirox (PENLAC) 8 % solution Apply topically greater than 8 hours before showering. Clean nails with alcohol weekly. 1 Bottle 3    FLUoxetine (PROZAC) 20 MG capsule Take 1 capsule by mouth daily 30 capsule 3    cloNIDine (CATAPRES) 0.1 MG tablet TAKE 1 TABLET BY MOUTH EVERY NIGHT AS NEEDED FOR INSOMNIA 90 tablet 0    amphetamine-dextroamphetamine (ADDERALL XR) 15 MG extended release capsule Take 1 capsule by mouth daily for 30 days. 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 BP Readings from Last 3 Encounters:   05/04/21 113/76   03/18/21 120/78   11/15/19 114/72       NPO Status:                                                                                 BMI:   Wt Readings from Last 3 Encounters:   05/04/21 196 lb (88.9 kg)   03/18/21 196 lb (88.9 kg)   11/15/19 205 lb (93 kg)     There is no height or weight on file to calculate BMI.    CBC:   Lab Results   Component Value Date    WBC 6.4 03/22/2021    RBC 4.18 03/22/2021    HGB 13.3 03/22/2021    HCT 40.4 03/22/2021    MCV 96.7 03/22/2021    RDW 12.2 03/22/2021     03/22/2021       CMP:   Lab Results   Component Value Date     03/22/2021    K 4.0 03/22/2021     03/22/2021    CO2 25 03/22/2021    BUN 11 03/22/2021    CREATININE 0.5 03/22/2021    GFRAA >59 03/22/2021    LABGLOM >60 03/22/2021    GLUCOSE 77 03/22/2021    PROT 7.1 03/22/2021    CALCIUM 9.1 03/22/2021    BILITOT 0.7 03/22/2021    ALKPHOS 62 03/22/2021    AST 19 03/22/2021    ALT 14 03/22/2021       POC Tests: No results for input(s): POCGLU, POCNA, POCK, POCCL, POCBUN, POCHEMO, POCHCT in the last 72 hours.     Coags: No results found for: PROTIME, INR, APTT    HCG (If Applicable): No results found for: PREGTESTUR, PREGSERUM, HCG, HCGQUANT     ABGs: No results found for: PHART, PO2ART, BKG4XVX, WPG3MBE, BEART, K0FGXNIQ     Type & Screen (If Applicable):  No results found for: LABABO, LABRH    Drug/Infectious Status (If Applicable):  No results found for: HIV, HEPCAB    COVID-19 Screening (If Applicable):   Lab Results   Component Value Date    COVID19 Not Detected 05/01/2021           Anesthesia Evaluation  Patient summary reviewed and Nursing notes reviewed  Airway: Mallampati: II  TM distance: >3 FB   Neck ROM: full  Mouth opening: > = 3 FB Dental: normal exam         Pulmonary:Negative Pulmonary ROS and normal exam Cardiovascular:Negative CV ROS                      Neuro/Psych:   (+) psychiatric history:            GI/Hepatic/Renal: Neg GI/Hepatic/Renal ROS            Endo/Other: Negative Endo/Other ROS                    Abdominal:           Vascular: negative vascular ROS. Anesthesia Plan      general and TIVA     ASA 1       Induction: intravenous. Anesthetic plan and risks discussed with patient. Plan discussed with CRNA.                   Jodie Nobles, MICAELA - CRNA   5/4/2021

## 2021-05-04 NOTE — H&P
Patient Name: Aimee Finch  : 1990  MRN: [id number]  DATE: 21    Allergies: Allergies   Allergen Reactions    Keflex [Cephalexin] Hives        ENDOSCOPY  History and Physical    Procedure:    [] Diagnostic Colonoscopy       [] Screening Colonoscopy  [x] EGD      [] ERCP      [] EUS       [] Other    [x] Previous office notes/History and Physical reviewed from the patients chart. Please see EMR for further details of HPI. I have examined the patient's status immediately prior to the procedure and:      Indications/HPI:      1. Dysphagia, unspecified type  2. Heartburn  3. Globus sensation    []Abdominal Pain   []Cancer- GI/Lung     []Fhx of colon CA/polyps  []History of Polyps  []Barretts            []Melena  []Abnormal Imaging              []Dysphagia              []Persistent Pneumonia   []Anemia                            []Food Impaction        []History of Polyps  [] GI Bleed             []Pulmonary nodule/Mass   []Change in bowel habits []Heartburn/Reflux  []Rectal Bleed (BRBPR)  []Chest Pain - Non Cardiac []Heme (+) Stool []Ulcers  []Constipation  []Hemoptysis  []Varices  []Diarrhea  []Hypoxemia    []Nausea/Vomiting   []Screening   []Crohns/Colitis  []Other:     Anesthesia:   [x] MAC [] Moderate Sedation   [] General   [] None     ROS: 12 pt Review of Symptoms was negative unless mentioned above    Medications:   Prior to Admission medications    Medication Sig Start Date End Date Taking? Authorizing Provider   fluconazole (DIFLUCAN) 150 MG tablet Take 1 tablet by mouth every 72 hours for 3 doses 21 Yes MICAELA Valle   amphetamine-dextroamphetamine (ADDERALL XR) 15 MG extended release capsule Take 1 capsule by mouth daily for 30 days. 21 Yes Renetta Alvarez MD   omeprazole (PRILOSEC) 40 MG delayed release capsule Take 1 capsule by mouth daily Take first thing daily on an empty stomach.  21  Yes MICAELA Yoder - NP   fluticasone (FLONASE) 50 MCG/ACT nasal spray 2 sprays by Nasal route daily 3/18/21  Yes MICAELA Miller   ciclopirox [facility]) 8 % solution Apply topically greater than 8 hours before showering. Clean nails with alcohol weekly. 3/18/21  Yes MICAELA Miller   FLUoxetine (PROZAC) 20 MG capsule Take 1 capsule by mouth daily 3/3/21  Yes MICAELA Miller   cloNIDine (CATAPRES) 0.1 MG tablet TAKE 1 TABLET BY MOUTH EVERY NIGHT AS NEEDED FOR INSOMNIA 2/24/21  Yes MICAELA Miller   diclofenac sodium (VOLTAREN) 1 % GEL Apply 2 g topically 4 times daily 9/4/20  Yes MICAELA Miller   CRANBERRY EXTRACT PO Take by mouth   Yes Historical Provider, MD       Past Medical History:  Past Medical History:   Diagnosis Date    Anxiety and depression 1/20/2019    Anxiety and depression     GERD (gastroesophageal reflux disease)        Past Surgical History:  Past Surgical History:   Procedure Laterality Date    CHOLECYSTECTOMY      WISDOM TOOTH EXTRACTION         Social History:  Social History     Tobacco Use    Smoking status: Never Smoker    Smokeless tobacco: Never Used   Substance Use Topics    Alcohol use: Yes     Comment: occ    Drug use: No       Vital Signs:   Vitals:    05/04/21 1029   BP: 110/70   Pulse: 62   Resp: 20   Temp: 97.6 °F (36.4 °C)   SpO2: 99%        Physical Exam:  Cardiac:  [x]WNL  []Comments:  Pulmonary:  [x]WNL   []Comments:  Neuro/Mental Status:  [x]WNL  []Comments:  Abdominal:  [x]WNL    []Comments:  Other:   []WNL  []Comments:    Informed Consent:  The risks and benefits of the procedure have been discussed with either the patient or if they cannot consent, their representative. Assessment:  Patient examined and appropriate for planned sedation and procedure. Plan:  Proceed with planned sedation and procedure as above.          Bryn Cantor MD

## 2021-05-04 NOTE — PATIENT INSTRUCTIONS
Patient Education        Vaginal Yeast Infection: Care Instructions  Overview     A vaginal yeast infection is the growth of too many yeast cells in the vagina. This is common in women of all ages. Itching, vaginal discharge and irritation, and other symptoms can bother you. But yeast infections don't often cause other health problems. Some medicines can increase your risk of getting a yeast infection. These include antibiotics, birth control pills, hormones, and steroids. You may also be more likely to get a yeast infection if you are pregnant, have diabetes, douche, or wear tight clothes. With treatment, most yeast infections get better in 2 to 3 days. Follow-up care is a key part of your treatment and safety. Be sure to make and go to all appointments, and call your doctor if you are having problems. It's also a good idea to know your test results and keep a list of the medicines you take. How can you care for yourself at home? · Take your medicines exactly as prescribed. Call your doctor if you think you are having a problem with your medicine. · Ask your doctor about over-the-counter (OTC) medicines for yeast infections. They may cost less than prescription medicines. If you use an OTC treatment, read and follow all instructions on the label. · Don't use tampons while using a vaginal cream or suppository. The tampons can absorb the medicine. Use pads instead. · Wear loose cotton clothing. Don't wear nylon or other fabric that holds body heat and moisture close to the skin. · Try sleeping without underwear. · Don't scratch. Relieve itching with a cold pack or a cool bath. · Don't wash your vaginal area more than once a day. Use plain water or a mild, unscented soap. Air-dry the vaginal area. · Change out of wet swimsuits after swimming. · If you are using a vaginal medicine, don't have sex until you have finished your treatment.  But if you do have sex, don't depend on a condom or diaphragm for birth control. The oil in some vaginal medicines weakens latex. · Don't douche. When should you call for help? Call your doctor now or seek immediate medical care if:    · You have unexpected vaginal bleeding.     · You have new or increased pain in your vagina or pelvis. Watch closely for changes in your health, and be sure to contact your doctor if:    · You have a fever.     · You are not getting better after 2 days.     · Your symptoms come back after you finish your medicines. Where can you learn more? Go to [email or web address]://[email or web address].[email or web address] org and sign in to your [employer] account. Enter C771 in the [employer] box to learn more about \"Vaginal Yeast Infection: Care Instructions. \"     If you do not have an account, please click on the \"Sign Up Now\" link. Current as of: July 17, 2020               Content Version: 12.8  © 2006-2021 [employer]. Care instructions adapted under license by [facility] ([facility]). If you have questions about a medical condition or this instruction, always ask your healthcare professional. [address] any warranty or liability for your use of this information. Patient Education        Vaginitis: Care Instructions  Your Care Instructions     Vaginitis is soreness or infection of the vagina. This common problem can cause itching and burning. And it can cause a change in vaginal discharge. Sometimes it can cause pain during sex. Vaginitis may be caused by bacteria, yeast, or other germs. Some infections that cause it are caught from a sexual partner. Bath products, spermicides, and douches can irritate the vagina too. Some women have this problem during and after menopause. A drop in estrogen levels during this time can cause dryness, soreness, and pain during sex. Your doctor can give you medicine to treat an infection. And home care may help you feel better.  For certain types of infections, your sex partner must be treated too.  Follow-up care is a key part of your treatment and safety. Be sure to make and go to all appointments, and call your doctor if you are having problems. It's also a good idea to know your test results and keep a list of the medicines you take. How can you care for yourself at home? · If your doctor prescribed antibiotics, take them as directed. Do not stop taking them just because you feel better. You need to take the full course of antibiotics. · Take your medicines exactly as prescribed. Call your doctor if you think you are having a problem with your medicine. · Do not eat or drink anything that has alcohol if you are taking metronidazole (Flagyl). · If you have a yeast infection, use over-the-counter products as your doctor tells you to. Or take medicine your doctor prescribes exactly as directed. · Wash your vaginal area daily with water. You also can use a mild, unscented soap if you want. · Do not use scented bath products. And do not use vaginal sprays or douches. · Put a washcloth soaked in cool water on the area to relieve itching. Or you can take cool baths. · If you have dryness because of menopause, use estrogen cream or pills that your doctor prescribes. · Ask your doctor about when it is okay to have sex. · Use a personal lubricant before sex if you have dryness. Examples are Astroglide, K-Y Jelly, and Wet Lubricant Gel. · Ask your doctor if your sex partner also needs treatment. When should you call for help? Call your doctor now or seek immediate medical care if:    · You have a fever and pelvic pain. Watch closely for changes in your health, and be sure to contact your doctor if:    · You have bleeding other than your period.     · You do not get better as expected. Where can you learn more? Go to https://chpepiceweb.[facility]. org and sign in to your [employer] account. Enter E817 in the [facility] box to learn more about \"Vaginitis: Care Instructions. \"

## 2021-05-05 DIAGNOSIS — B96.89 GARDNERELLA VAGINALIS INFECTION: Primary | ICD-10-CM

## 2021-05-05 DIAGNOSIS — N34.1 NONGONOCOCCAL URETHRITIS DUE TO UREAPLASMA UREALYTICUM: ICD-10-CM

## 2021-05-05 DIAGNOSIS — N76.0 GARDNERELLA VAGINALIS INFECTION: Primary | ICD-10-CM

## 2021-05-05 DIAGNOSIS — A49.3 NONGONOCOCCAL URETHRITIS DUE TO UREAPLASMA UREALYTICUM: ICD-10-CM

## 2021-05-05 RX ORDER — METRONIDAZOLE 500 MG/1
500 TABLET ORAL 2 TIMES DAILY
Qty: 14 TABLET | Refills: 0 | Status: SHIPPED | OUTPATIENT
Start: 2021-05-05 | End: 2021-05-12

## 2021-05-05 RX ORDER — AZITHROMYCIN 500 MG/1
1000 TABLET, FILM COATED ORAL ONCE
Qty: 2 TABLET | Refills: 0 | Status: SHIPPED | OUTPATIENT
Start: 2021-05-05 | End: 2021-05-05

## 2021-05-05 NOTE — PROGRESS NOTES
[facility] came back positive for candida albicans, gardnerella vaginalis, and ureaplasma. She has been treated for candida already with the diflucan. I have sent in Flagyl and azithromycin as well for her to take. No sexual activity until treatment complete and symptoms resolve.

## 2021-05-28 RX ORDER — CLONIDINE HYDROCHLORIDE 0.1 MG/1
TABLET ORAL
Qty: 90 TABLET | Refills: 0 | Status: SHIPPED | OUTPATIENT
Start: 2021-05-28 | End: 2021-12-01 | Stop reason: SDUPTHER

## 2021-06-02 DIAGNOSIS — F90.9 ADULT ADHD (ATTENTION DEFICIT HYPERACTIVITY DISORDER): ICD-10-CM

## 2021-06-03 RX ORDER — DEXTROAMPHETAMINE SACCHARATE, AMPHETAMINE ASPARTATE MONOHYDRATE, DEXTROAMPHETAMINE SULFATE AND AMPHETAMINE SULFATE 3.75; 3.75; 3.75; 3.75 MG/1; MG/1; MG/1; MG/1
15 CAPSULE, EXTENDED RELEASE ORAL DAILY
Qty: 30 CAPSULE | Refills: 0 | Status: SHIPPED | OUTPATIENT
Start: 2021-06-03 | End: 2021-08-05 | Stop reason: SDUPTHER

## 2021-06-27 DIAGNOSIS — F32.A ANXIETY AND DEPRESSION: ICD-10-CM

## 2021-06-27 DIAGNOSIS — F41.9 ANXIETY AND DEPRESSION: ICD-10-CM

## 2021-06-28 RX ORDER — FLUOXETINE HYDROCHLORIDE 20 MG/1
20 CAPSULE ORAL DAILY
Qty: 30 CAPSULE | Refills: 3 | Status: SHIPPED | OUTPATIENT
Start: 2021-06-28 | End: 2021-11-24

## 2021-07-08 RX ORDER — OMEPRAZOLE 40 MG/1
CAPSULE, DELAYED RELEASE ORAL
Qty: 30 CAPSULE | Refills: 11 | Status: SHIPPED | OUTPATIENT
Start: 2021-07-08

## 2021-08-04 DIAGNOSIS — F90.9 ADULT ADHD (ATTENTION DEFICIT HYPERACTIVITY DISORDER): ICD-10-CM

## 2021-08-04 NOTE — TELEPHONE ENCOUNTER
Citlali Mckee called to request a refill on her medication. Last office visit : 3/18/2021   Next office visit : Visit date not found     Last UDS:   Amphetamine Screen, Urine   Date Value Ref Range Status   03/18/2021 +  Final     Comment:     Pt should not be positive as we have not prescribed in a month or longer     Barbiturate Screen, Urine   Date Value Ref Range Status   03/18/2021 -  Final     Benzodiazepine Screen, Urine   Date Value Ref Range Status   03/18/2021 -  Final     Buprenorphine Urine   Date Value Ref Range Status   03/18/2021 -  Final     Cocaine Metabolite Screen, Urine   Date Value Ref Range Status   03/18/2021 -  Final     Gabapentin Screen, Urine   Date Value Ref Range Status   03/18/2021 -  Final     MDMA, Urine   Date Value Ref Range Status   03/18/2021 -  Final     Methamphetamine, Urine   Date Value Ref Range Status   03/18/2021 -  Final     Opiate Scrn, Ur   Date Value Ref Range Status   03/18/2021 -  Final     Oxycodone Screen, Ur   Date Value Ref Range Status   03/18/2021 -  Final     PCP Screen, Urine   Date Value Ref Range Status   03/18/2021 -  Final     Propoxyphene Screen, Urine   Date Value Ref Range Status   03/18/2021 -  Final     THC Screen, Urine   Date Value Ref Range Status   03/18/2021 -  Final     Tricyclic Antidepressants, Urine   Date Value Ref Range Status   03/18/2021 -  Final       Last Obdulia Smyth: 4/21/2021  Medication Contract: 3/19/2021   Last Fill: 6/3/2021    Requested Prescriptions     Pending Prescriptions Disp Refills    amphetamine-dextroamphetamine (ADDERALL XR) 15 MG extended release capsule 30 capsule 0     Sig: Take 1 capsule by mouth daily for 30 days. Please approve or refuse this medication.    Pradeep Garcia

## 2021-08-05 RX ORDER — DEXTROAMPHETAMINE SACCHARATE, AMPHETAMINE ASPARTATE MONOHYDRATE, DEXTROAMPHETAMINE SULFATE AND AMPHETAMINE SULFATE 3.75; 3.75; 3.75; 3.75 MG/1; MG/1; MG/1; MG/1
15 CAPSULE, EXTENDED RELEASE ORAL DAILY
Qty: 30 CAPSULE | Refills: 0 | Status: SHIPPED | OUTPATIENT
Start: 2021-08-05 | End: 2021-09-23 | Stop reason: SDUPTHER

## 2021-08-05 NOTE — TELEPHONE ENCOUNTER
Refills given but needs UDS scheduled at next appt or within 3 months at the latest or earlier if noted by provider.

## 2021-09-11 PROCEDURE — 87086 URINE CULTURE/COLONY COUNT: CPT | Performed by: NURSE PRACTITIONER

## 2021-09-13 PROCEDURE — U0004 COV-19 TEST NON-CDC HGH THRU: HCPCS | Performed by: FAMILY MEDICINE

## 2021-09-22 DIAGNOSIS — F90.9 ADULT ADHD (ATTENTION DEFICIT HYPERACTIVITY DISORDER): ICD-10-CM

## 2021-09-22 NOTE — TELEPHONE ENCOUNTER
Nico Balderas called to request a refill on her medication. Last office visit : 3/18/2021   Next office visit : Visit date not found     Last UDS:   Amphetamine Screen, Urine   Date Value Ref Range Status   03/18/2021 +  Final     Comment:     Pt should not be positive as we have not prescribed in a month or longer     Barbiturate Screen, Urine   Date Value Ref Range Status   03/18/2021 -  Final     Benzodiazepine Screen, Urine   Date Value Ref Range Status   03/18/2021 -  Final     Buprenorphine Urine   Date Value Ref Range Status   03/18/2021 -  Final     Cocaine Metabolite Screen, Urine   Date Value Ref Range Status   03/18/2021 -  Final     Gabapentin Screen, Urine   Date Value Ref Range Status   03/18/2021 -  Final     MDMA, Urine   Date Value Ref Range Status   03/18/2021 -  Final     Methamphetamine, Urine   Date Value Ref Range Status   03/18/2021 -  Final     Opiate Scrn, Ur   Date Value Ref Range Status   03/18/2021 -  Final     Oxycodone Screen, Ur   Date Value Ref Range Status   03/18/2021 -  Final     PCP Screen, Urine   Date Value Ref Range Status   03/18/2021 -  Final     Propoxyphene Screen, Urine   Date Value Ref Range Status   03/18/2021 -  Final     THC Screen, Urine   Date Value Ref Range Status   03/18/2021 -  Final     Tricyclic Antidepressants, Urine   Date Value Ref Range Status   03/18/2021 -  Final       Last Genie Forth: 9-22-21  Medication Contract: 3-19-21   Last Fill: 8-5-21    Requested Prescriptions     Pending Prescriptions Disp Refills    amphetamine-dextroamphetamine (ADDERALL XR) 15 MG extended release capsule 30 capsule 0     Sig: Take 1 capsule by mouth daily for 30 days.  amphetamine-dextroamphetamine (ADDERALL XR) 15 MG extended release capsule 30 capsule 0     Sig: Take 1 capsule by mouth daily for 30 days. Please approve or refuse this medication.    Kevin Castro MA

## 2021-09-23 RX ORDER — DEXTROAMPHETAMINE SACCHARATE, AMPHETAMINE ASPARTATE MONOHYDRATE, DEXTROAMPHETAMINE SULFATE AND AMPHETAMINE SULFATE 3.75; 3.75; 3.75; 3.75 MG/1; MG/1; MG/1; MG/1
15 CAPSULE, EXTENDED RELEASE ORAL DAILY
Qty: 30 CAPSULE | Refills: 0 | Status: SHIPPED | OUTPATIENT
Start: 2021-09-23 | End: 2021-12-15 | Stop reason: SDUPTHER

## 2021-09-23 RX ORDER — DEXTROAMPHETAMINE SACCHARATE, AMPHETAMINE ASPARTATE MONOHYDRATE, DEXTROAMPHETAMINE SULFATE AND AMPHETAMINE SULFATE 3.75; 3.75; 3.75; 3.75 MG/1; MG/1; MG/1; MG/1
15 CAPSULE, EXTENDED RELEASE ORAL DAILY
Qty: 30 CAPSULE | Refills: 0 | Status: SHIPPED | OUTPATIENT
Start: 2021-10-22 | End: 2021-12-01

## 2021-11-15 NOTE — TELEPHONE ENCOUNTER
Pt called to request refiills on Adderall XR and Clonidine. Pt was informed she would need to first be seen in the office in order to obtain refills as she had not been seen recently. Appt has been scheduled for 12/1/2021.

## 2021-11-24 DIAGNOSIS — F41.9 ANXIETY AND DEPRESSION: ICD-10-CM

## 2021-11-24 DIAGNOSIS — F32.A ANXIETY AND DEPRESSION: ICD-10-CM

## 2021-11-24 RX ORDER — FLUOXETINE HYDROCHLORIDE 20 MG/1
20 CAPSULE ORAL DAILY
Qty: 30 CAPSULE | Refills: 3 | Status: SHIPPED | OUTPATIENT
Start: 2021-11-24 | End: 2021-12-01 | Stop reason: SDUPTHER

## 2021-11-24 NOTE — TELEPHONE ENCOUNTER
Ayaka Norwood called to request a refill on her medication.       Last office visit : 3/18/2021   Next office visit : 12/1/2021     Requested Prescriptions     Signed Prescriptions Disp Refills    FLUoxetine (PROZAC) 20 MG capsule 30 capsule 3     Sig: TAKE 1 CAPSULE BY MOUTH DAILY     Authorizing Provider: Karen Edwards     Ordering User: Arnold Scherer MA

## 2021-11-27 ENCOUNTER — OFFICE VISIT (OUTPATIENT)
Dept: URGENT CARE | Age: 31
End: 2021-11-27
Payer: COMMERCIAL

## 2021-11-27 VITALS
SYSTOLIC BLOOD PRESSURE: 118 MMHG | WEIGHT: 198.8 LBS | RESPIRATION RATE: 20 BRPM | HEART RATE: 72 BPM | HEIGHT: 68 IN | BODY MASS INDEX: 30.13 KG/M2 | TEMPERATURE: 97.4 F | DIASTOLIC BLOOD PRESSURE: 84 MMHG | OXYGEN SATURATION: 98 %

## 2021-11-27 DIAGNOSIS — R82.998 LEUKOCYTES IN URINE: Primary | ICD-10-CM

## 2021-11-27 DIAGNOSIS — N89.8 VAGINAL IRRITATION: ICD-10-CM

## 2021-11-27 LAB
APPEARANCE FLUID: ABNORMAL
BILIRUBIN, POC: ABNORMAL
BLOOD URINE, POC: ABNORMAL
CLARITY, POC: ABNORMAL
COLOR, POC: YELLOW
GLUCOSE URINE, POC: ABNORMAL
KETONES, POC: ABNORMAL
LEUKOCYTE EST, POC: ABNORMAL
NITRITE, POC: ABNORMAL
PH, POC: 6
PROTEIN, POC: ABNORMAL
SPECIFIC GRAVITY, POC: >=1.03
UROBILINOGEN, POC: ABNORMAL

## 2021-11-27 PROCEDURE — 81002 URINALYSIS NONAUTO W/O SCOPE: CPT | Performed by: NURSE PRACTITIONER

## 2021-11-27 PROCEDURE — 99213 OFFICE O/P EST LOW 20 MIN: CPT | Performed by: NURSE PRACTITIONER

## 2021-11-27 RX ORDER — FLUCONAZOLE 150 MG/1
150 TABLET ORAL ONCE
Qty: 1 TABLET | Refills: 0 | Status: SHIPPED | OUTPATIENT
Start: 2021-11-27 | End: 2021-11-27

## 2021-11-27 ASSESSMENT — ENCOUNTER SYMPTOMS: NAUSEA: 0

## 2021-11-27 NOTE — PATIENT INSTRUCTIONS
We will call you results of the urine and vaginal swab    No sexual relations until symptoms clear and you have been treated    Take diflucan today    Warm compresses or cool compresses may help with pain    Vagisil or monistat may help with pain    Return as needed

## 2021-11-27 NOTE — PROGRESS NOTES
[facility]   [address], [address]     Phone:  [phone]  Fax:  [phone]      Lawrence Monsivais is a 32 y.o. female who presents today for her medical conditions/complaints as noted below. Lawrence Monsivais is c/o of Dysuria (pt. states she has had burning in vaginal for the past 3 days)      Chief Complaint   Patient presents with    Dysuria     pt. states she has had burning in vaginal for the past 3 days       HPI:     Lawrence Monsivais presents today for   Vaginal Discharge  The patient's primary symptoms include genital itching and vaginal discharge. The patient's pertinent negatives include no genital lesions, genital odor, genital rash, pelvic pain or vaginal bleeding. This is a new problem. The current episode started in the past 7 days. The problem occurs constantly. The problem has been gradually worsening. The pain is moderate. The problem affects both sides. She is not pregnant. Associated symptoms include dysuria and hematuria. Pertinent negatives include no discolored urine, fever, flank pain, frequency, nausea, painful intercourse, rash or urgency. The vaginal discharge was white and thin. There has been no bleeding. She has not been passing clots. She has not been passing tissue. The symptoms are aggravated by tactile pressure, intercourse and urinating. She has tried antifungals (monistat) for the symptoms. The treatment provided no relief. She is sexually active. No, her partner does not have an STD. There is no history of an STD or vaginosis. Past Medical History:   Diagnosis Date    Anxiety and depression 1/20/2019    Anxiety and depression     GERD (gastroesophageal reflux disease)         Past Surgical History:   Procedure Laterality Date    CHOLECYSTECTOMY      UPPER GASTROINTESTINAL ENDOSCOPY N/A 05/04/2021    Dr Chavez December, w 47 Fr Dil, partially obstructing lower esoph ring, (-)H.  pylori, (+) GERD wo EOE, sm 1-2mm erosions sugg of chemical gastritis,  UPPER GASTROINTESTINAL ENDOSCOPY  05/04/2021    Dr Simone Rodrigez weighted Bougie dilator-54 Fr    WISDOM TOOTH EXTRACTION         Social History     Tobacco Use    Smoking status: Never Smoker    Smokeless tobacco: Never Used   Substance Use Topics    Alcohol use: Yes     Comment: occ        Current Outpatient Medications   Medication Sig Dispense Refill    fluconazole (DIFLUCAN) 150 MG tablet Take 1 tablet by mouth once for 1 dose 1 tablet 0    FLUoxetine (PROZAC) 20 MG capsule TAKE 1 CAPSULE BY MOUTH DAILY 30 capsule 3    omeprazole (PRILOSEC) 40 MG delayed release capsule TAKE ONE CAPSULE BY MOUTH ONCE DAILY. TAKE FIRST THING DAILY ON AN EMPTY STOMACH 30 capsule 11    cloNIDine (CATAPRES) 0.1 MG tablet TAKE 1 TABLET BY MOUTH EVERY NIGHT AS NEEDED FOR INSOMNIA 90 tablet 0    fluticasone (FLONASE) 50 MCG/ACT nasal spray 2 sprays by Nasal route daily 1 Bottle 3    ciclopirox (PENLAC) 8 % solution Apply topically greater than 8 hours before showering. Clean nails with alcohol weekly. 1 Bottle 3    diclofenac sodium (VOLTAREN) 1 % GEL Apply 2 g topically 4 times daily 2 Tube 1    CRANBERRY EXTRACT PO Take by mouth      amphetamine-dextroamphetamine (ADDERALL XR) 15 MG extended release capsule Take 1 capsule by mouth daily for 30 days. 30 capsule 0    amphetamine-dextroamphetamine (ADDERALL XR) 15 MG extended release capsule Take 1 capsule by mouth daily for 30 days. 30 capsule 0     No current facility-administered medications for this visit. Allergies   Allergen Reactions    Keflex [Cephalexin] Hives       Family History   Problem Relation Age of Onset    Diabetes Maternal Grandmother     Cancer Paternal Grandfather                Review of Systems   Constitutional: Negative for fever. Gastrointestinal: Negative for nausea. Genitourinary: Positive for dysuria, hematuria and vaginal discharge. Negative for flank pain, frequency, pelvic pain and urgency.    Skin: Negative for rash.       Objective:     Physical Exam  Vitals and nursing note reviewed. Constitutional:       General: She is not in acute distress. Appearance: Normal appearance. She is well-developed. She is not ill-appearing, toxic-appearing or diaphoretic. HENT:      Head: Normocephalic and atraumatic. Right Ear: External ear normal.      Left Ear: External ear normal.      Nose: Nose normal.      Mouth/Throat:      Dentition: Normal dentition. Eyes:      General:         Right eye: No discharge. Left eye: No discharge. Conjunctiva/sclera: Conjunctivae normal.   Cardiovascular:      Rate and Rhythm: Normal rate. Pulmonary:      Effort: Pulmonary effort is normal. No respiratory distress. Breath sounds: No rhonchi. Genitourinary:     Exam position: Knee-chest position. Pubic Area: No rash. Vagina: Vaginal discharge (thin, white), erythema and tenderness present. No lesions. Musculoskeletal:         General: Normal range of motion. Cervical back: Normal range of motion and neck supple. Lumbar back: Normal range of motion. Skin:     General: Skin is warm and dry. Capillary Refill: Capillary refill takes less than 2 seconds. Findings: No rash. Neurological:      Mental Status: She is alert and oriented to person, place, and time. Psychiatric:         Mood and Affect: Mood normal.         Behavior: Behavior normal.         /84 (Site: Left Upper Arm, Position: Sitting, Cuff Size: Large Adult)   Pulse 72   Temp 97.4 °F (36.3 °C) (Temporal)   Resp 20   Ht 5' 8\" (1.727 m)   Wt 198 lb 12.8 oz (90.2 kg)   SpO2 98%   BMI 30.23 kg/m²     Assessment:      Diagnosis Orders   1. Leukocytes in urine  Culture, Urine    fluconazole (DIFLUCAN) 150 MG tablet   2.  Vaginal irritation  POCT Urinalysis no Micro    fluconazole (DIFLUCAN) 150 MG tablet       Results for orders placed or performed in visit on 11/27/21   POCT Urinalysis no Micro   Result Value Ref Range    Color, UA yellow     Clarity, UA cloudy     Glucose, UA POC neg     Bilirubin, UA neg     Ketones, UA neg     Spec Grav, UA >=1.030     Blood, UA POC trace-lysed     pH, UA 6.0     Protein, UA POC neg     Urobilinogen, UA 0.2 E.U. / dl     Leukocytes, UA large     Nitrite, UA neg     Appearance, Fluid Cloudy (A) Clear, Slightly Cloudy       Plan:     [facility] sent for BV, STD, and yeast panel    Urine culture    Treat with Diflucan at this time    Warm or cool compresses    Abstain from sexual activities    Return if symptoms worsen or fail to improve. Orders Placed This Encounter   Procedures    Culture, Urine     Order Specific Question:   Specify (ex-cath, midstream, cysto, etc)? Answer:   midstream    POCT Urinalysis no Micro       Orders Placed This Encounter   Medications    fluconazole (DIFLUCAN) 150 MG tablet     Sig: Take 1 tablet by mouth once for 1 dose     Dispense:  1 tablet     Refill:  0        Patient offered educational materials - see patient instructions for any instruction needed. Discussed use, benefit, and side effects of prescribed medications. All patient questions answered. Instructed to continue current medications, diet and exercise. Patient agreed with treatment plan. Follow up as directed. Patient was advised to go to the ED if condition ever becomes emergent.        Electronically signed by MICAELA Wall on 11/27/2021 at 11:55 AM

## 2021-11-29 LAB — URINE CULTURE, ROUTINE: NORMAL

## 2021-12-01 ENCOUNTER — TELEPHONE (OUTPATIENT)
Dept: URGENT CARE | Age: 31
End: 2021-12-01

## 2021-12-01 ENCOUNTER — OFFICE VISIT (OUTPATIENT)
Dept: PRIMARY CARE CLINIC | Age: 31
End: 2021-12-01
Payer: COMMERCIAL

## 2021-12-01 VITALS
DIASTOLIC BLOOD PRESSURE: 80 MMHG | HEART RATE: 81 BPM | OXYGEN SATURATION: 98 % | WEIGHT: 202 LBS | BODY MASS INDEX: 30.62 KG/M2 | SYSTOLIC BLOOD PRESSURE: 125 MMHG | HEIGHT: 68 IN | TEMPERATURE: 99.1 F

## 2021-12-01 DIAGNOSIS — B35.1 NAIL FUNGUS: ICD-10-CM

## 2021-12-01 DIAGNOSIS — F90.9 ADULT ADHD (ATTENTION DEFICIT HYPERACTIVITY DISORDER): ICD-10-CM

## 2021-12-01 DIAGNOSIS — Z22.39 CARRIER OF UREAPLASMA UREALYTICUM: Primary | ICD-10-CM

## 2021-12-01 DIAGNOSIS — Z79.899 DRUG THERAPY: ICD-10-CM

## 2021-12-01 DIAGNOSIS — F41.9 ANXIETY AND DEPRESSION: Primary | ICD-10-CM

## 2021-12-01 DIAGNOSIS — F32.A ANXIETY AND DEPRESSION: Primary | ICD-10-CM

## 2021-12-01 PROCEDURE — 99214 OFFICE O/P EST MOD 30 MIN: CPT | Performed by: NURSE PRACTITIONER

## 2021-12-01 PROCEDURE — 80305 DRUG TEST PRSMV DIR OPT OBS: CPT | Performed by: NURSE PRACTITIONER

## 2021-12-01 RX ORDER — CLONIDINE HYDROCHLORIDE 0.1 MG/1
TABLET ORAL
Qty: 90 TABLET | Refills: 1 | Status: SHIPPED | OUTPATIENT
Start: 2021-12-01 | End: 2022-05-23

## 2021-12-01 RX ORDER — FLUOXETINE HYDROCHLORIDE 20 MG/1
20 CAPSULE ORAL DAILY
Qty: 90 CAPSULE | Refills: 3 | Status: SHIPPED | OUTPATIENT
Start: 2021-12-01

## 2021-12-01 RX ORDER — TERBINAFINE HYDROCHLORIDE 250 MG/1
250 TABLET ORAL DAILY
Qty: 30 TABLET | Refills: 0 | Status: SHIPPED | OUTPATIENT
Start: 2021-12-01 | End: 2021-12-31

## 2021-12-01 RX ORDER — AZITHROMYCIN 500 MG/1
1000 TABLET, FILM COATED ORAL ONCE
Qty: 2 TABLET | Refills: 0 | Status: SHIPPED | OUTPATIENT
Start: 2021-12-01 | End: 2021-12-01

## 2021-12-01 ASSESSMENT — ENCOUNTER SYMPTOMS
EYES NEGATIVE: 1
GASTROINTESTINAL NEGATIVE: 1
RESPIRATORY NEGATIVE: 1
ROS SKIN COMMENTS: NAIL FUNGUS

## 2021-12-01 NOTE — TELEPHONE ENCOUNTER
Please call pt and let her know that her diatherix was positive for yeast infection and a BV bacteria called ureaplasma urealyticum. She was already prescribed diflucan and that will treat the yeast infection. She will need a dose of azithromycin to threat the BV bacteria that she was positive for. Sending to pharmacy.

## 2021-12-01 NOTE — PROGRESS NOTES
[address] [facility]  [address] [address]  [address] [address]  Dept: [phone]  Dept Fax: [phone]  Loc: [phone]    Daniel Ortiz is a 32 y.o. female who presents today for her medical conditions/complaints as noted below. Daniel Ortiz is c/o of ADHD and Nail Problem      Chief Complaint   Patient presents with    ADHD    Nail Problem       HPI:     HPI  Patient is here for adhd follow up. She states that everything is going good there. She has continued to take Adderall XR, but has not had a refill on this medication. She was also seen in the past for a toe nail fungus that is still there. She took Lamsil last spring and the fungus was almost all gone, but it has returned some. Past Medical History:   Diagnosis Date    Anxiety and depression 1/20/2019    Anxiety and depression     GERD (gastroesophageal reflux disease)         Past Surgical History:   Procedure Laterality Date    CHOLECYSTECTOMY      UPPER GASTROINTESTINAL ENDOSCOPY N/A 05/04/2021    Dr Arielle Carvajal, w 47 Fr Dil, partially obstructing lower esoph ring, (-)H.  pylori, (+) GERD wo EOE, sm 1-2mm erosions sugg of chemical gastritis,    UPPER GASTROINTESTINAL ENDOSCOPY  05/04/2021    Dr Alessandro Dunbar weighted Bougie dilator-54 Fr    WISDOM TOOTH EXTRACTION         Social History     Tobacco Use    Smoking status: Never Smoker    Smokeless tobacco: Never Used   Substance Use Topics    Alcohol use: Yes     Comment: occ        Current Outpatient Medications   Medication Sig Dispense Refill    azithromycin (ZITHROMAX) 500 MG tablet Take 2 tablets by mouth once for 1 dose 2 tablet 0    cloNIDine (CATAPRES) 0.1 MG tablet TAKE 1 TABLET BY MOUTH EVERY NIGHT AS NEEDED FOR INSOMNIA 90 tablet 1    FLUoxetine (PROZAC) 20 MG capsule Take 1 capsule by mouth daily 90 capsule 3    terbinafine (LAMISIL) 250 MG tablet Take 1 tablet by mouth daily 30 tablet 0    amphetamine-dextroamphetamine (ADDERALL XR) 15 MG extended release capsule Take 1 capsule by mouth daily for 30 days. 30 capsule 0    omeprazole (PRILOSEC) 40 MG delayed release capsule TAKE ONE CAPSULE BY MOUTH ONCE DAILY. TAKE FIRST THING DAILY ON AN EMPTY STOMACH 30 capsule 11    fluticasone (FLONASE) 50 MCG/ACT nasal spray 2 sprays by Nasal route daily 1 Bottle 3    ciclopirox (PENLAC) 8 % solution Apply topically greater than 8 hours before showering. Clean nails with alcohol weekly. 1 Bottle 3    diclofenac sodium (VOLTAREN) 1 % GEL Apply 2 g topically 4 times daily 2 Tube 1    CRANBERRY EXTRACT PO Take by mouth       No current facility-administered medications for this visit. Allergies   Allergen Reactions    Keflex [Cephalexin] Hives       Family History   Problem Relation Age of Onset    Diabetes Maternal Grandmother     Cancer Paternal Grandfather                Subjective:      Review of Systems   Constitutional: Negative. HENT: Negative. Eyes: Negative. Respiratory: Negative. Cardiovascular: Negative. Gastrointestinal: Negative. Endocrine: Negative. Genitourinary: Negative. Musculoskeletal: Negative. Skin: Negative. Nail fungus     Neurological: Negative. Hematological: Negative. Psychiatric/Behavioral: Negative. Objective:     Physical Exam  Vitals and nursing note reviewed. Constitutional:       Appearance: Normal appearance. HENT:      Head: Normocephalic and atraumatic. Jaw: There is normal jaw occlusion. Right Ear: Hearing, tympanic membrane, ear canal and external ear normal.      Left Ear: Hearing, tympanic membrane, ear canal and external ear normal.      Nose: Nose normal.      Mouth/Throat:      Lips: Pink. Mouth: Mucous membranes are moist.      Pharynx: Oropharynx is clear. Eyes:      General: Lids are normal.      Extraocular Movements: Extraocular movements intact.       Conjunctiva/sclera: Conjunctivae normal.      Pupils: Pupils are equal, round, and reactive to light. Neck:      Thyroid: No thyromegaly. Trachea: Trachea normal.   Cardiovascular:      Rate and Rhythm: Normal rate and regular rhythm. Pulses: Normal pulses. Dorsalis pedis pulses are 2+ on the right side and 2+ on the left side. Posterior tibial pulses are 2+ on the right side and 2+ on the left side. Heart sounds: Normal heart sounds. No murmur heard. Pulmonary:      Effort: Pulmonary effort is normal.      Breath sounds: Normal breath sounds and air entry. Abdominal:      General: Bowel sounds are normal.      Palpations: Abdomen is soft. Musculoskeletal:      Cervical back: Full passive range of motion without pain, normal range of motion and neck supple. Thoracic back: Normal range of motion. Lumbar back: Normal range of motion. Right lower leg: No edema. Left lower leg: No edema. Feet:    Feet:      Right foot:      Toenail Condition: Fungal disease present. Left foot:      Toenail Condition: Fungal disease present. Lymphadenopathy:      Cervical: No cervical adenopathy. Skin:     General: Skin is warm and dry. Capillary Refill: Capillary refill takes less than 2 seconds. Neurological:      General: No focal deficit present. Mental Status: She is alert and oriented to person, place, and time. Mental status is at baseline. Psychiatric:         Attention and Perception: Attention normal.         Mood and Affect: Mood normal.         Speech: Speech normal.         Behavior: Behavior normal.         Thought Content: Thought content normal.         Cognition and Memory: Cognition normal.         Judgment: Judgment normal.         /80 (Site: Left Upper Arm, Position: Sitting)   Pulse 81   Temp 99.1 °F (37.3 °C)   Ht 5' 8\" (1.727 m)   Wt 202 lb (91.6 kg)   SpO2 98%   BMI 30.71 kg/m²     Assessment:      Diagnosis Orders   1.  Anxiety and depression FLUoxetine (PROZAC) 20 MG capsule   2. Nail fungus     3. Drug therapy  POCT Rapid Drug Screen   4. Adult ADHD (attention deficit hyperactivity disorder)         Results for orders placed or performed in visit on 12/01/21   POCT Rapid Drug Screen   Result Value Ref Range    Alcohol, Urine -     Amphetamine Screen, Urine +     Barbiturate Screen, Urine -     Benzodiazepine Screen, Urine -     Buprenorphine Urine -     Cocaine Metabolite Screen, Urine -     FENTANYL SCREEN, URINE -     Gabapentin Screen, Urine -     MDMA, Urine -     Methadone Screen, Urine -     Methamphetamine, Urine -     Opiate Scrn, Ur -     Oxycodone Screen, Ur -     PCP Screen, Urine -     Propoxyphene Screen, Urine -     Synthetic Cannabinoids (K2) Screen, Urine -     THC Screen, Urine -     Tramadol Scrn, Ur -     Tricyclic Antidepressants, Urine -        Plan:     Patient has not been taking Adderall daily and is only getting prescription every 45 days or so. She would like to continue this regimen. Sending in Lamisil for fungus. More than 50% of the time was spent counseling and coordinating care for a total time of 35 mins face to face. Return in about 3 months (around 3/1/2022) for ADHD, Refill with UDS. Orders Placed This Encounter   Procedures    POCT Rapid Drug Screen       Orders Placed This Encounter   Medications    cloNIDine (CATAPRES) 0.1 MG tablet     Sig: TAKE 1 TABLET BY MOUTH EVERY NIGHT AS NEEDED FOR INSOMNIA     Dispense:  90 tablet     Refill:  1    FLUoxetine (PROZAC) 20 MG capsule     Sig: Take 1 capsule by mouth daily     Dispense:  90 capsule     Refill:  3    terbinafine (LAMISIL) 250 MG tablet     Sig: Take 1 tablet by mouth daily     Dispense:  30 tablet     Refill:  0            Patient offered educational handouts and has had all questions answered. Patient voices understanding and agrees to plans along with risks and benefits of plan.  Patient is instructed to continue prior meds, diet, and exercise plans as instructed. Patient agrees to follow up as instructed and sooner if needed. Patient agrees to go to ER if condition becomes emergent. EMR Dragon/transcription disclaimer: Some of this encounter note is an electronic transcription/translation of spoken language to printed text. The electronic translation of spoken language may permit erroneous, or at times, nonsensical words or phrases to be inadvertently transcribed.  Although I have reviewed the note for such errors, some may still exist.    Electronically signed by MICAELA Gordon on 12/1/2021 at 3:01 PM

## 2021-12-13 DIAGNOSIS — F90.9 ADULT ADHD (ATTENTION DEFICIT HYPERACTIVITY DISORDER): ICD-10-CM

## 2021-12-13 NOTE — TELEPHONE ENCOUNTER
Devika Navya called to request a refill on her medication. Last Poli Pickup: 12-  Medication Contract: needs update   Last Fill: 9-    Last office visit : 12/1/2021   Next office visit : 3/2/2022     Last UDS:   Amphetamine Screen, Urine   Date Value Ref Range Status   12/01/2021 +  Final     Barbiturate Screen, Urine   Date Value Ref Range Status   12/01/2021 -  Final     Benzodiazepine Screen, Urine   Date Value Ref Range Status   12/01/2021 -  Final     Buprenorphine Urine   Date Value Ref Range Status   12/01/2021 -  Final     Cocaine Metabolite Screen, Urine   Date Value Ref Range Status   12/01/2021 -  Final     Gabapentin Screen, Urine   Date Value Ref Range Status   12/01/2021 -  Final     MDMA, Urine   Date Value Ref Range Status   12/01/2021 -  Final     Methamphetamine, Urine   Date Value Ref Range Status   12/01/2021 -  Final     Opiate Scrn, Ur   Date Value Ref Range Status   12/01/2021 -  Final     Oxycodone Screen, Ur   Date Value Ref Range Status   12/01/2021 -  Final     PCP Screen, Urine   Date Value Ref Range Status   12/01/2021 -  Final     Propoxyphene Screen, Urine   Date Value Ref Range Status   12/01/2021 -  Final     THC Screen, Urine   Date Value Ref Range Status   12/01/2021 -  Final     Tricyclic Antidepressants, Urine   Date Value Ref Range Status   12/01/2021 -  Final               Requested Prescriptions     Pending Prescriptions Disp Refills    amphetamine-dextroamphetamine (ADDERALL XR) 15 MG extended release capsule 30 capsule 0     Sig: Take 1 capsule by mouth daily for 30 days. Please approve or refuse this medication.    Norah Vargas MA

## 2021-12-15 RX ORDER — DEXTROAMPHETAMINE SACCHARATE, AMPHETAMINE ASPARTATE MONOHYDRATE, DEXTROAMPHETAMINE SULFATE AND AMPHETAMINE SULFATE 3.75; 3.75; 3.75; 3.75 MG/1; MG/1; MG/1; MG/1
15 CAPSULE, EXTENDED RELEASE ORAL DAILY
Qty: 30 CAPSULE | Refills: 0 | Status: SHIPPED | OUTPATIENT
Start: 2021-12-15 | End: 2022-01-12 | Stop reason: SDUPTHER

## 2022-02-16 DIAGNOSIS — F90.9 ADULT ADHD (ATTENTION DEFICIT HYPERACTIVITY DISORDER): ICD-10-CM

## 2022-02-17 RX ORDER — DEXTROAMPHETAMINE SACCHARATE, AMPHETAMINE ASPARTATE MONOHYDRATE, DEXTROAMPHETAMINE SULFATE AND AMPHETAMINE SULFATE 3.75; 3.75; 3.75; 3.75 MG/1; MG/1; MG/1; MG/1
15 CAPSULE, EXTENDED RELEASE ORAL DAILY
Qty: 30 CAPSULE | Refills: 0 | Status: SHIPPED | OUTPATIENT
Start: 2022-02-17 | End: 2022-03-23 | Stop reason: SDUPTHER

## 2022-02-17 NOTE — TELEPHONE ENCOUNTER
Ana Parks called to request a refill on her medication. Last office visit : 12/1/2021   Next office visit : 2/25/2022     Last Chrissie Mcfarlandaser: 1/13/22  Medication Contract: 3/19/21    Last Fill: 1/17/22      Last UDS:   Amphetamine Screen, Urine   Date Value Ref Range Status   12/01/2021 +  Final     Barbiturate Screen, Urine   Date Value Ref Range Status   12/01/2021 -  Final     Benzodiazepine Screen, Urine   Date Value Ref Range Status   12/01/2021 -  Final     Buprenorphine Urine   Date Value Ref Range Status   12/01/2021 -  Final     Cocaine Metabolite Screen, Urine   Date Value Ref Range Status   12/01/2021 -  Final     Gabapentin Screen, Urine   Date Value Ref Range Status   12/01/2021 -  Final     MDMA, Urine   Date Value Ref Range Status   12/01/2021 -  Final     Methamphetamine, Urine   Date Value Ref Range Status   12/01/2021 -  Final     Opiate Scrn, Ur   Date Value Ref Range Status   12/01/2021 -  Final     Oxycodone Screen, Ur   Date Value Ref Range Status   12/01/2021 -  Final     PCP Screen, Urine   Date Value Ref Range Status   12/01/2021 -  Final     Propoxyphene Screen, Urine   Date Value Ref Range Status   12/01/2021 -  Final     THC Screen, Urine   Date Value Ref Range Status   12/01/2021 -  Final     Tricyclic Antidepressants, Urine   Date Value Ref Range Status   12/01/2021 -  Final                 Requested Prescriptions     Pending Prescriptions Disp Refills    amphetamine-dextroamphetamine (ADDERALL XR) 15 MG extended release capsule 30 capsule 0     Sig: Take 1 capsule by mouth daily for 30 days. Please approve or refuse this medication.    Denisa Judge MA

## 2022-02-25 ENCOUNTER — OFFICE VISIT (OUTPATIENT)
Dept: PRIMARY CARE CLINIC | Age: 32
End: 2022-02-25
Payer: COMMERCIAL

## 2022-02-25 VITALS
DIASTOLIC BLOOD PRESSURE: 78 MMHG | BODY MASS INDEX: 28 KG/M2 | HEIGHT: 71 IN | SYSTOLIC BLOOD PRESSURE: 128 MMHG | TEMPERATURE: 97.7 F | WEIGHT: 200 LBS | OXYGEN SATURATION: 96 % | HEART RATE: 83 BPM

## 2022-02-25 DIAGNOSIS — F90.9 ADULT ADHD (ATTENTION DEFICIT HYPERACTIVITY DISORDER): Primary | ICD-10-CM

## 2022-02-25 DIAGNOSIS — J32.9 VIRAL SINUSITIS: ICD-10-CM

## 2022-02-25 DIAGNOSIS — B97.89 VIRAL SINUSITIS: ICD-10-CM

## 2022-02-25 PROCEDURE — 99213 OFFICE O/P EST LOW 20 MIN: CPT | Performed by: NURSE PRACTITIONER

## 2022-02-25 PROCEDURE — 80305 DRUG TEST PRSMV DIR OPT OBS: CPT | Performed by: NURSE PRACTITIONER

## 2022-02-25 RX ORDER — FLUTICASONE PROPIONATE 50 MCG
2 SPRAY, SUSPENSION (ML) NASAL DAILY
Qty: 16 G | Refills: 3 | Status: SHIPPED | OUTPATIENT
Start: 2022-02-25

## 2022-02-25 RX ORDER — METHYLPREDNISOLONE 4 MG/1
TABLET ORAL
Qty: 1 KIT | Refills: 0 | Status: SHIPPED | OUTPATIENT
Start: 2022-02-25 | End: 2022-06-30

## 2022-02-25 RX ORDER — GUAIFENESIN 600 MG/1
1200 TABLET, EXTENDED RELEASE ORAL 2 TIMES DAILY
Qty: 40 TABLET | Refills: 0 | Status: SHIPPED | OUTPATIENT
Start: 2022-02-25 | End: 2022-03-07

## 2022-02-25 ASSESSMENT — ENCOUNTER SYMPTOMS
COUGH: 1
GASTROINTESTINAL NEGATIVE: 1
SINUS PRESSURE: 1
EYES NEGATIVE: 1
SINUS PAIN: 1

## 2022-02-25 NOTE — PROGRESS NOTES
[address] [facility]  [address] [address]  [address] [address]  Dept: [phone]  Dept Fax: [phone]  Loc: [phone]    Thuna Lindo is a 32 y.o. female who presents today for her medical conditions/complaints as noted below. Thuan Lindo is c/o of ADHD (wants increase ) and Sinus Problem (sx started this morning)      Chief Complaint   Patient presents with    ADHD     wants increase     Sinus Problem     sx started this morning       HPI:     HPI  Pt is here to increase Adderall. She states that the medication feels like \"it is not enough\". She is having more trouble concentrating that usual.  She feels like it is in the afternoon when the medication is wearing off. Pt woke up with sinus problems this morning. She denies fever or sore throat. She has not tried any over the counter meds for relief. Past Medical History:   Diagnosis Date    Anxiety and depression 1/20/2019    Anxiety and depression     GERD (gastroesophageal reflux disease)         Past Surgical History:   Procedure Laterality Date    CHOLECYSTECTOMY      UPPER GASTROINTESTINAL ENDOSCOPY N/A 05/04/2021    Dr Mecca Durham, w 47 Fr Dil, partially obstructing lower esoph ring, (-)H. pylori, (+) GERD wo EOE, sm 1-2mm erosions sugg of chemical gastritis,    UPPER GASTROINTESTINAL ENDOSCOPY  05/04/2021    Dr April Cueto weighted Bougie dilator-54 Fr    WISDOM TOOTH EXTRACTION         Social History     Tobacco Use    Smoking status: Never Smoker    Smokeless tobacco: Never Used   Substance Use Topics    Alcohol use: Yes     Comment: occ        Current Outpatient Medications   Medication Sig Dispense Refill    methylPREDNISolone (MEDROL DOSEPACK) 4 MG tablet Take by mouth.  1 kit 0    fluticasone (FLONASE) 50 MCG/ACT nasal spray 2 sprays by Nasal route daily 16 g 3    guaiFENesin (MUCINEX) 600 MG extended release tablet Take 2 tablets by mouth 2 times daily for 10 days 40 tablet 0    amphetamine-dextroamphetamine (ADDERALL XR) 15 MG extended release capsule Take 1 capsule by mouth daily for 30 days. 30 capsule 0    cloNIDine (CATAPRES) 0.1 MG tablet TAKE 1 TABLET BY MOUTH EVERY NIGHT AS NEEDED FOR INSOMNIA 90 tablet 1    FLUoxetine (PROZAC) 20 MG capsule Take 1 capsule by mouth daily 90 capsule 3    omeprazole (PRILOSEC) 40 MG delayed release capsule TAKE ONE CAPSULE BY MOUTH ONCE DAILY. TAKE FIRST THING DAILY ON AN EMPTY STOMACH 30 capsule 11    fluticasone (FLONASE) 50 MCG/ACT nasal spray 2 sprays by Nasal route daily 1 Bottle 3    ciclopirox (PENLAC) 8 % solution Apply topically greater than 8 hours before showering. Clean nails with alcohol weekly. 1 Bottle 3    diclofenac sodium (VOLTAREN) 1 % GEL Apply 2 g topically 4 times daily 2 Tube 1    CRANBERRY EXTRACT PO Take by mouth       No current facility-administered medications for this visit. Allergies   Allergen Reactions    Keflex [Cephalexin] Hives       Family History   Problem Relation Age of Onset    Diabetes Maternal Grandmother     Cancer Paternal Grandfather          Subjective:      Review of Systems   Constitutional: Positive for fatigue. HENT: Positive for congestion, ear pain (right), sinus pressure and sinus pain. Eyes: Negative. Respiratory: Positive for cough. Cardiovascular: Negative. Gastrointestinal: Negative. Endocrine: Negative. Genitourinary: Negative. Musculoskeletal: Negative. Skin: Negative. Neurological: Negative. Hematological: Negative. Psychiatric/Behavioral: Positive for decreased concentration. Objective:     Physical Exam  Vitals and nursing note reviewed. Constitutional:       Appearance: Normal appearance. She is well-developed. HENT:      Head: Normocephalic and atraumatic. Right Ear: Hearing, ear canal and external ear normal. A middle ear effusion is present.       Left Ear: Hearing, ear canal and external ear normal. A middle ear effusion is present. Nose: Mucosal edema and rhinorrhea present. Right Sinus: Maxillary sinus tenderness and frontal sinus tenderness present. Left Sinus: Maxillary sinus tenderness and frontal sinus tenderness present. Mouth/Throat:      Pharynx: Uvula midline. Posterior oropharyngeal erythema present. No oropharyngeal exudate. Eyes:      General: Lids are normal.      Conjunctiva/sclera: Conjunctivae normal.      Pupils: Pupils are equal, round, and reactive to light. Neck:      Thyroid: No thyroid mass or thyromegaly. Trachea: Trachea normal.   Cardiovascular:      Rate and Rhythm: Normal rate and regular rhythm. Heart sounds: Normal heart sounds. Pulmonary:      Effort: Pulmonary effort is normal.      Breath sounds: Normal breath sounds. Abdominal:      General: Bowel sounds are normal.      Palpations: Abdomen is soft. Musculoskeletal:         General: Normal range of motion. Cervical back: Normal range of motion and neck supple. No tenderness. Thoracic back: Normal. No tenderness. Normal range of motion. Lumbar back: Normal. No tenderness. Normal range of motion. Skin:     General: Skin is warm and dry. Neurological:      Mental Status: She is alert and oriented to person, place, and time. Psychiatric:         Speech: Speech normal.         Behavior: Behavior normal.         Thought Content: Thought content normal.         Judgment: Judgment normal.         /78 (Site: Right Upper Arm)   Pulse 83   Temp 97.7 °F (36.5 °C)   Ht 5' 11\" (1.803 m)   Wt 200 lb (90.7 kg)   SpO2 96%   BMI 27.89 kg/m²     Assessment:      Diagnosis Orders   1. Adult ADHD (attention deficit hyperactivity disorder)  POCT Rapid Drug Screen   2.  Viral sinusitis  methylPREDNISolone (MEDROL DOSEPACK) 4 MG tablet    fluticasone (FLONASE) 50 MCG/ACT nasal spray    guaiFENesin (MUCINEX) 600 MG extended release tablet       Results for orders placed or performed in visit on 22   POCT Rapid Drug Screen   Result Value Ref Range    Alcohol, Urine -     Amphetamine Screen, Urine +     Barbiturate Screen, Urine -     Benzodiazepine Screen, Urine -     Buprenorphine Urine -     Cocaine Metabolite Screen, Urine -     FENTANYL SCREEN, URINE -     Gabapentin Screen, Urine -     MDMA, Urine -     Methadone Screen, Urine -     Methamphetamine, Urine -     Opiate Scrn, Ur -     Oxycodone Screen, Ur -     PCP Screen, Urine -     Propoxyphene Screen, Urine -     Synthetic Cannabinoids (K2) Screen, Urine -     THC Screen, Urine -     Tramadol Scrn, Ur -     Tricyclic Antidepressants, Urine -        Plan: Will increase Adderall at next refill. Next refill please send in Adderall 25 mg daily. Discussed viral sinus issues. Will treat symptomatically since this just started today. More than 50% of the time was spent counseling and coordinating care for a total time of 27 mins face to face. Return in about 6 weeks (around 2022) for ADHD follow up. Orders Placed This Encounter   Procedures    POCT Rapid Drug Screen       Orders Placed This Encounter   Medications    methylPREDNISolone (MEDROL DOSEPACK) 4 MG tablet     Sig: Take by mouth. Dispense:  1 kit     Refill:  0    fluticasone (FLONASE) 50 MCG/ACT nasal spray     Si sprays by Nasal route daily     Dispense:  16 g     Refill:  3    guaiFENesin (MUCINEX) 600 MG extended release tablet     Sig: Take 2 tablets by mouth 2 times daily for 10 days     Dispense:  40 tablet     Refill:  0            Patient offered educational handouts and has had all questions answered. Patient voices understanding and agrees to plans along with risks and benefits of plan. Patient is instructed to continue prior meds, diet, and exercise plans as instructed. Patient agrees to follow up as instructed and sooner if needed. Patient agrees to go to ER if condition becomes emergent.       EMR Dragon/transcription disclaimer: Some of this encounter note is an electronic transcription/translation of spoken language to printed text. The electronic translation of spoken language may permit erroneous, or at times, nonsensical words or phrases to be inadvertently transcribed.  Although I have reviewed the note for such errors, some may still exist.    Electronically signed by MICAELA Woo on 2/27/2022 at 8:42 PM

## 2022-03-22 ENCOUNTER — PATIENT MESSAGE (OUTPATIENT)
Dept: PRIMARY CARE CLINIC | Age: 32
End: 2022-03-22

## 2022-03-22 DIAGNOSIS — F90.9 ADULT ADHD (ATTENTION DEFICIT HYPERACTIVITY DISORDER): Primary | ICD-10-CM

## 2022-03-23 DIAGNOSIS — F90.9 ADULT ADHD (ATTENTION DEFICIT HYPERACTIVITY DISORDER): ICD-10-CM

## 2022-03-23 RX ORDER — DEXTROAMPHETAMINE SACCHARATE, AMPHETAMINE ASPARTATE MONOHYDRATE, DEXTROAMPHETAMINE SULFATE AND AMPHETAMINE SULFATE 5; 5; 5; 5 MG/1; MG/1; MG/1; MG/1
20 CAPSULE, EXTENDED RELEASE ORAL EVERY MORNING
Qty: 30 CAPSULE | Refills: 0 | Status: SHIPPED | OUTPATIENT
Start: 2022-03-23 | End: 2022-04-22 | Stop reason: SDUPTHER

## 2022-03-23 NOTE — TELEPHONE ENCOUNTER
Ana Parks called to request a refill on her medication. Last office visit : 2/25/2022   Next office visit : 4/22/2022     Last UDS:   Amphetamine Screen, Urine   Date Value Ref Range Status   02/25/2022 +  Final     Barbiturate Screen, Urine   Date Value Ref Range Status   02/25/2022 -  Final     Benzodiazepine Screen, Urine   Date Value Ref Range Status   02/25/2022 -  Final     Buprenorphine Urine   Date Value Ref Range Status   02/25/2022 -  Final     Cocaine Metabolite Screen, Urine   Date Value Ref Range Status   02/25/2022 -  Final     Gabapentin Screen, Urine   Date Value Ref Range Status   02/25/2022 -  Final     MDMA, Urine   Date Value Ref Range Status   02/25/2022 -  Final     Methamphetamine, Urine   Date Value Ref Range Status   02/25/2022 -  Final     Opiate Scrn, Ur   Date Value Ref Range Status   02/25/2022 -  Final     Oxycodone Screen, Ur   Date Value Ref Range Status   02/25/2022 -  Final     PCP Screen, Urine   Date Value Ref Range Status   02/25/2022 -  Final     Propoxyphene Screen, Urine   Date Value Ref Range Status   02/25/2022 -  Final     THC Screen, Urine   Date Value Ref Range Status   02/25/2022 -  Final     Tricyclic Antidepressants, Urine   Date Value Ref Range Status   02/25/2022 -  Final       Last Chrissie Mcfarlandaser: 3/23/2022  Medication Contract: 3/19/2021  Last Fill: 2/18/2022    Requested Prescriptions     Pending Prescriptions Disp Refills    amphetamine-dextroamphetamine (ADDERALL XR) 15 MG extended release capsule 30 capsule 0     Sig: Take 1 capsule by mouth daily for 30 days. Please approve or refuse this medication.    Lauren Panda

## 2022-03-24 RX ORDER — DEXTROAMPHETAMINE SACCHARATE, AMPHETAMINE ASPARTATE MONOHYDRATE, DEXTROAMPHETAMINE SULFATE AND AMPHETAMINE SULFATE 3.75; 3.75; 3.75; 3.75 MG/1; MG/1; MG/1; MG/1
15 CAPSULE, EXTENDED RELEASE ORAL DAILY
Qty: 30 CAPSULE | Refills: 0 | Status: SHIPPED | OUTPATIENT
Start: 2022-03-24 | End: 2022-04-22

## 2022-03-24 NOTE — TELEPHONE ENCOUNTER
Ongoing SW/CM Assessment/Plan of Care Note     See SW/CM flowsheets for goals and other objective data.    Patient/Family discharge goal (s):  Goal #1: Psychosocial needs assessed     PT Recommendation:  Recommendation for Discharge: PT WI: Post acute therapy    OT Recommendation:  Recommendations for Discharge: OT WI: Post acute therapy    SLP Recommendation:  Recommendations for Discharge: SLP: physiatry consult recommended, further d/c recs pending progress but likely to need post-acute SLP services    Disposition:  Planned Discharge Destination: Rehabilitation/Skilled Care    Progress note:   4LM Coverage:    SW following for d/c planning.  Aware  Neuropsych eval recs Activation of HCPOA, Activation signed by 2 MDs and scanned into the medical record.    Aware IRP consulted yesterday.  Per MR review, Radiation Oncology recommending 4 weeks (20 txs) daily (Mon-Fri) radiation therapy.  Can occur at [facility] if pt transfers to IRP; otherwise, Rad/Onc to arrange OP in [place].    Reviewed Palliative Care note which indicates pt and family preference to pursue radiation treatment.      Therapy following and recommending post acute rehab.  Aware pt ambulating 160 + 100 feet with close supervision for safety, 2 wheel walker, some verbal cues and prompting to avoid objects and stabilize gait.    Will await IRP determination.  SW following    ADD: Received update from Dr. Mena that pt was denied by IRP as he is too high functioning.  Family expressing interest in SNF in [place].  [facility] referrals [facility]. RN updated  Aware Rad/Onc recommending 4 weeks (20tx) of radiation therapy.  Rad/Onc to identify therapy program in that area.    Unit SW to f/u on Monday   LION was reviewed today per office protocol. Report shows No discrepancies. Fill pattern is consistent from single provider(s) at single pharmacy(s).     Presciption Escribed

## 2022-04-19 ENCOUNTER — TELEPHONE (OUTPATIENT)
Dept: PRIMARY CARE CLINIC | Age: 32
End: 2022-04-19

## 2022-04-22 ENCOUNTER — OFFICE VISIT (OUTPATIENT)
Dept: PRIMARY CARE CLINIC | Age: 32
End: 2022-04-22
Payer: COMMERCIAL

## 2022-04-22 VITALS
HEIGHT: 71 IN | BODY MASS INDEX: 27.86 KG/M2 | OXYGEN SATURATION: 98 % | DIASTOLIC BLOOD PRESSURE: 72 MMHG | TEMPERATURE: 98 F | RESPIRATION RATE: 18 BRPM | WEIGHT: 199 LBS | SYSTOLIC BLOOD PRESSURE: 116 MMHG | HEART RATE: 70 BPM

## 2022-04-22 DIAGNOSIS — F90.9 ADULT ADHD (ATTENTION DEFICIT HYPERACTIVITY DISORDER): ICD-10-CM

## 2022-04-22 DIAGNOSIS — F90.9 ADULT ADHD (ATTENTION DEFICIT HYPERACTIVITY DISORDER): Primary | ICD-10-CM

## 2022-04-22 DIAGNOSIS — S90.219A CONTUSION OF GREAT TOE WITH DAMAGE TO NAIL, INITIAL ENCOUNTER: ICD-10-CM

## 2022-04-22 PROCEDURE — 99213 OFFICE O/P EST LOW 20 MIN: CPT | Performed by: NURSE PRACTITIONER

## 2022-04-22 RX ORDER — DEXTROAMPHETAMINE SACCHARATE, AMPHETAMINE ASPARTATE MONOHYDRATE, DEXTROAMPHETAMINE SULFATE AND AMPHETAMINE SULFATE 5; 5; 5; 5 MG/1; MG/1; MG/1; MG/1
20 CAPSULE, EXTENDED RELEASE ORAL EVERY MORNING
Qty: 30 CAPSULE | Refills: 0 | Status: SHIPPED | OUTPATIENT
Start: 2022-04-22 | End: 2022-06-30 | Stop reason: SDUPTHER

## 2022-04-22 ASSESSMENT — PATIENT HEALTH QUESTIONNAIRE - PHQ9
SUM OF ALL RESPONSES TO PHQ QUESTIONS 1-9: 0
SUM OF ALL RESPONSES TO PHQ QUESTIONS 1-9: 0
SUM OF ALL RESPONSES TO PHQ9 QUESTIONS 1 & 2: 0
7. TROUBLE CONCENTRATING ON THINGS, SUCH AS READING THE NEWSPAPER OR WATCHING TELEVISION: 0
3. TROUBLE FALLING OR STAYING ASLEEP: 0
1. LITTLE INTEREST OR PLEASURE IN DOING THINGS: 0
SUM OF ALL RESPONSES TO PHQ QUESTIONS 1-9: 0
4. FEELING TIRED OR HAVING LITTLE ENERGY: 0
5. POOR APPETITE OR OVEREATING: 0
8. MOVING OR SPEAKING SO SLOWLY THAT OTHER PEOPLE COULD HAVE NOTICED. OR THE OPPOSITE, BEING SO FIGETY OR RESTLESS THAT YOU HAVE BEEN MOVING AROUND A LOT MORE THAN USUAL: 0
2. FEELING DOWN, DEPRESSED OR HOPELESS: 0
SUM OF ALL RESPONSES TO PHQ QUESTIONS 1-9: 0
10. IF YOU CHECKED OFF ANY PROBLEMS, HOW DIFFICULT HAVE THESE PROBLEMS MADE IT FOR YOU TO DO YOUR WORK, TAKE CARE OF THINGS AT HOME, OR GET ALONG WITH OTHER PEOPLE: 0
9. THOUGHTS THAT YOU WOULD BE BETTER OFF DEAD, OR OF HURTING YOURSELF: 0
6. FEELING BAD ABOUT YOURSELF - OR THAT YOU ARE A FAILURE OR HAVE LET YOURSELF OR YOUR FAMILY DOWN: 0

## 2022-04-22 ASSESSMENT — ENCOUNTER SYMPTOMS
GASTROINTESTINAL NEGATIVE: 1
EYES NEGATIVE: 1
RESPIRATORY NEGATIVE: 1

## 2022-04-22 NOTE — TELEPHONE ENCOUNTER
Bernardo Caballero called to request a refill on her medication. Last office visit : 4/22/2022   Next office visit : 7/22/2022     Last Pedro Pablo:3/23/22  Medication Contract: 3/19/21  Last UDS: 2/25/22  Last Rx:3/23/22    Amphetamine Screen, Urine   Date Value Ref Range Status   02/25/2022 +  Final     Barbiturate Screen, Urine   Date Value Ref Range Status   02/25/2022 -  Final     Benzodiazepine Screen, Urine   Date Value Ref Range Status   02/25/2022 -  Final     Buprenorphine Urine   Date Value Ref Range Status   02/25/2022 -  Final     Cocaine Metabolite Screen, Urine   Date Value Ref Range Status   02/25/2022 -  Final     Gabapentin Screen, Urine   Date Value Ref Range Status   02/25/2022 -  Final     MDMA, Urine   Date Value Ref Range Status   02/25/2022 -  Final     Methamphetamine, Urine   Date Value Ref Range Status   02/25/2022 -  Final     Opiate Scrn, Ur   Date Value Ref Range Status   02/25/2022 -  Final     Oxycodone Screen, Ur   Date Value Ref Range Status   02/25/2022 -  Final     PCP Screen, Urine   Date Value Ref Range Status   02/25/2022 -  Final     Propoxyphene Screen, Urine   Date Value Ref Range Status   02/25/2022 -  Final     THC Screen, Urine   Date Value Ref Range Status   02/25/2022 -  Final     Tricyclic Antidepressants, Urine   Date Value Ref Range Status   02/25/2022 -  Final           Requested Prescriptions     Pending Prescriptions Disp Refills    amphetamine-dextroamphetamine (ADDERALL XR) 20 MG extended release capsule 30 capsule 0     Sig: Take 1 capsule by mouth every morning for 30 days. Please approve or refuse this medication.    Lauren Giraldo

## 2022-04-22 NOTE — PROGRESS NOTES
[facility]  [address] [place] [address]  Dept: [phone]  Dept Fax: [phone]  Loc: [phone]    Shaq Ibrahim is a 28 y.o. female who presents today for her medical conditions/complaints as noted below. Shaq Ibrahim is c/o of ADHD (6 week follow up. Pt states she has no concerns about her medications. Pt states she wants to discuss her toenails, having the same issue from the past )      Chief Complaint   Patient presents with    ADHD     6 week follow up. Pt states she has no concerns about her medications. Pt states she wants to discuss her toenails, having the same issue from the past        HPI:     HPI   Patient here for follow up on ADHD. She reports that the increase of the Adderall to 20 mg has made a huge improvement. She would like to continue this current regimen. She is also concerned with bilateral big toe nail damage. She is afraid that it is fungal in nature. Past Medical History:   Diagnosis Date    Anxiety and depression 1/20/2019    Anxiety and depression     GERD (gastroesophageal reflux disease)         Past Surgical History:   Procedure Laterality Date    CHOLECYSTECTOMY      UPPER GASTROINTESTINAL ENDOSCOPY N/A 05/04/2021    Dr Vangie Jimenez, w 47 Fr Dil, partially obstructing lower esoph ring, (-)H. pylori, (+) GERD wo EOE, sm 1-2mm erosions sugg of chemical gastritis,    UPPER GASTROINTESTINAL ENDOSCOPY  05/04/2021    Dr Rosey Gallardo weighted Bougie dilator-54 Fr    WISDOM TOOTH EXTRACTION         Social History     Tobacco Use    Smoking status: Never Smoker    Smokeless tobacco: Never Used   Substance Use Topics    Alcohol use: Yes     Comment: occ        Current Outpatient Medications   Medication Sig Dispense Refill    amphetamine-dextroamphetamine (ADDERALL XR) 20 MG extended release capsule Take 1 capsule by mouth every morning for 30 days.  30 capsule 0    methylPREDNISolone (MEDROL DOSEPACK) 4 MG tablet Take by mouth. 1 kit 0    fluticasone (FLONASE) 50 MCG/ACT nasal spray 2 sprays by Nasal route daily 16 g 3    cloNIDine (CATAPRES) 0.1 MG tablet TAKE 1 TABLET BY MOUTH EVERY NIGHT AS NEEDED FOR INSOMNIA 90 tablet 1    FLUoxetine (PROZAC) 20 MG capsule Take 1 capsule by mouth daily 90 capsule 3    omeprazole (PRILOSEC) 40 MG delayed release capsule TAKE ONE CAPSULE BY MOUTH ONCE DAILY. TAKE FIRST THING DAILY ON AN EMPTY STOMACH 30 capsule 11    fluticasone (FLONASE) 50 MCG/ACT nasal spray 2 sprays by Nasal route daily 1 Bottle 3    ciclopirox (PENLAC) 8 % solution Apply topically greater than 8 hours before showering. Clean nails with alcohol weekly. 1 Bottle 3    diclofenac sodium (VOLTAREN) 1 % GEL Apply 2 g topically 4 times daily 2 Tube 1    CRANBERRY EXTRACT PO Take by mouth       No current facility-administered medications for this visit. Allergies   Allergen Reactions    Keflex [Cephalexin] Hives       Family History   Problem Relation Age of Onset    Diabetes Maternal Grandmother     Cancer Paternal Grandfather                Subjective:      Review of Systems   Constitutional: Negative. HENT: Negative. Eyes: Negative. Respiratory: Negative. Cardiovascular: Negative. Gastrointestinal: Negative. Endocrine: Negative. Genitourinary: Negative. Musculoskeletal: Negative. Skin:        Toenail damage   Neurological: Negative. Hematological: Negative. Psychiatric/Behavioral: Positive for decreased concentration (improved). Objective:     Physical Exam  Vitals and nursing note reviewed. Constitutional:       Appearance: Normal appearance. HENT:      Head: Normocephalic and atraumatic. Jaw: There is normal jaw occlusion.       Right Ear: Hearing, tympanic membrane, ear canal and external ear normal.      Left Ear: Hearing, tympanic membrane, ear canal and external ear normal.      Nose: Nose normal. Mouth/Throat:      Lips: Pink. Mouth: Mucous membranes are moist.      Pharynx: Oropharynx is clear. Eyes:      General: Lids are normal.      Extraocular Movements: Extraocular movements intact. Conjunctiva/sclera: Conjunctivae normal.      Pupils: Pupils are equal, round, and reactive to light. Neck:      Thyroid: No thyromegaly. Trachea: Trachea normal.   Cardiovascular:      Rate and Rhythm: Normal rate and regular rhythm. Pulses: Normal pulses. Dorsalis pedis pulses are 2+ on the right side and 2+ on the left side. Posterior tibial pulses are 2+ on the right side and 2+ on the left side. Heart sounds: Normal heart sounds. No murmur heard. Pulmonary:      Effort: Pulmonary effort is normal.      Breath sounds: Normal breath sounds and air entry. Abdominal:      General: Bowel sounds are normal.      Palpations: Abdomen is soft. Musculoskeletal:      Cervical back: Full passive range of motion without pain, normal range of motion and neck supple. Thoracic back: Normal range of motion. Lumbar back: Normal range of motion. Right lower leg: No edema. Left lower leg: No edema. Feet:    Lymphadenopathy:      Cervical: No cervical adenopathy. Skin:     General: Skin is warm and dry. Capillary Refill: Capillary refill takes less than 2 seconds. Neurological:      General: No focal deficit present. Mental Status: She is alert and oriented to person, place, and time. Mental status is at baseline. Psychiatric:         Attention and Perception: Attention normal.         Mood and Affect: Mood normal.         Speech: Speech normal.         Behavior: Behavior normal.         Thought Content:  Thought content normal.         Cognition and Memory: Cognition normal.         Judgment: Judgment normal.         /72 (Site: Left Upper Arm, Position: Sitting)   Pulse 70   Temp 98 °F (36.7 °C) (Temporal)   Resp 18   Ht 5' 11\" (1.803 m)   Wt 199 lb (90.3 kg)   SpO2 98%   BMI 27.75 kg/m²     Assessment:      Diagnosis Orders   1. Adult ADHD (attention deficit hyperactivity disorder)     2. Contusion of great toe with damage to nail, initial encounter         No results found for this visit on 04/22/22. Plan:     1. Adult ADHD (attention deficit hyperactivity disorder)  Doing well on current regimen. Will continue at this time. Will need repeat UDS at follow up appointment. 2. Contusion of great toe with damage to nail, initial encounter  Discussed that her toenail discoloration is related to damage of the nail bed. She believes she did it at the gym. Patient is to avoid this again and if no improvement we can refer to podiatry. Return in about 3 months (around 7/22/2022) for ADHD, establish with Dr Ramu Meadows. No orders of the defined types were placed in this encounter. No orders of the defined types were placed in this encounter. Patient offered educational handouts and has had all questions answered. Patient voices understanding and agrees to plans along with risks and benefits of plan. Patient is instructed to continue prior meds, diet, and exercise plans as instructed. Patient agrees to follow up as instructed and sooner if needed. Patient agrees to go to ER if condition becomes emergent. EMR Dragon/transcription disclaimer: Some of this encounter note is an electronic transcription/translation of spoken language to printed text. The electronic translation of spoken language may permit erroneous, or at times, nonsensical words or phrases to be inadvertently transcribed.  Although I have reviewed the note for such errors, some may still exist.    Electronically signed by MICAELA Hawkins on 4/22/2022 at 3:30 PM

## 2022-05-23 RX ORDER — CLONIDINE HYDROCHLORIDE 0.1 MG/1
TABLET ORAL
Qty: 90 TABLET | Refills: 1 | Status: SHIPPED | OUTPATIENT
Start: 2022-05-23 | End: 2022-10-24

## 2022-06-30 ENCOUNTER — OFFICE VISIT (OUTPATIENT)
Dept: PRIMARY CARE CLINIC | Age: 32
End: 2022-06-30
Payer: COMMERCIAL

## 2022-06-30 VITALS
HEIGHT: 71 IN | TEMPERATURE: 97.6 F | RESPIRATION RATE: 20 BRPM | SYSTOLIC BLOOD PRESSURE: 110 MMHG | OXYGEN SATURATION: 98 % | BODY MASS INDEX: 27.86 KG/M2 | DIASTOLIC BLOOD PRESSURE: 80 MMHG | HEART RATE: 86 BPM | WEIGHT: 199 LBS

## 2022-06-30 DIAGNOSIS — F32.A ANXIETY AND DEPRESSION: Chronic | ICD-10-CM

## 2022-06-30 DIAGNOSIS — K21.9 CHRONIC GASTROESOPHAGEAL REFLUX DISEASE: ICD-10-CM

## 2022-06-30 DIAGNOSIS — Z01.419 WELL WOMAN EXAM: ICD-10-CM

## 2022-06-30 DIAGNOSIS — F90.9 ADULT ADHD (ATTENTION DEFICIT HYPERACTIVITY DISORDER): Primary | ICD-10-CM

## 2022-06-30 DIAGNOSIS — F41.9 ANXIETY AND DEPRESSION: Chronic | ICD-10-CM

## 2022-06-30 PROCEDURE — 99214 OFFICE O/P EST MOD 30 MIN: CPT | Performed by: STUDENT IN AN ORGANIZED HEALTH CARE EDUCATION/TRAINING PROGRAM

## 2022-06-30 RX ORDER — DEXTROAMPHETAMINE SACCHARATE, AMPHETAMINE ASPARTATE MONOHYDRATE, DEXTROAMPHETAMINE SULFATE AND AMPHETAMINE SULFATE 5; 5; 5; 5 MG/1; MG/1; MG/1; MG/1
20 CAPSULE, EXTENDED RELEASE ORAL EVERY MORNING
Qty: 30 CAPSULE | Refills: 0 | Status: SHIPPED | OUTPATIENT
Start: 2022-06-30 | End: 2022-08-05 | Stop reason: SDUPTHER

## 2022-06-30 SDOH — ECONOMIC STABILITY: FOOD INSECURITY: WITHIN THE PAST 12 MONTHS, YOU WORRIED THAT YOUR FOOD WOULD RUN OUT BEFORE YOU GOT MONEY TO BUY MORE.: NEVER TRUE

## 2022-06-30 SDOH — ECONOMIC STABILITY: FOOD INSECURITY: WITHIN THE PAST 12 MONTHS, THE FOOD YOU BOUGHT JUST DIDN'T LAST AND YOU DIDN'T HAVE MONEY TO GET MORE.: NEVER TRUE

## 2022-06-30 ASSESSMENT — SOCIAL DETERMINANTS OF HEALTH (SDOH): HOW HARD IS IT FOR YOU TO PAY FOR THE VERY BASICS LIKE FOOD, HOUSING, MEDICAL CARE, AND HEATING?: NOT HARD AT ALL

## 2022-06-30 NOTE — PROGRESS NOTES
[facility]  [address] [address] [place] [address]  Dept: [phone]  Dept Fax: [phone]  Loc: [phone]      Subjective:     Chief Complaint   Patient presents with    Establish Care       HPI:  Tanvi Shaw is a 28 y.o. female presenting for    Here to establish care with me. Previous Katrinka Miss pt. ADHD  -On Adderall 20mg XR, has been on medicine since middle school. Dosage recently increased, has been on this dose for 2 mo. Change in dose has helped with concentration  -No adverse s/e such as palpitations, insomnia, or decreased appetite    GERD  -Symptoms controlled on Prilosec    Anxiety/depression  -On Prozac 20 mg. Symptoms well controlled on this. Patient describes agoraphobia which this medicine helps with. No SI        ROS:   Reviewed with patient and noted to be negative except that listed in HPI    PMHx:  Past Medical History:   Diagnosis Date    Anxiety and depression 1/20/2019    Anxiety and depression     GERD (gastroesophageal reflux disease)      Patient Active Problem List   Diagnosis    Anxiety and depression    Adult ADHD (attention deficit hyperactivity disorder)    Varicose veins of legs    Symptomatic reticular veins    Chronic gastroesophageal reflux disease       PSHx:  Past Surgical History:   Procedure Laterality Date    CHOLECYSTECTOMY      UPPER GASTROINTESTINAL ENDOSCOPY N/A 05/04/2021    Dr Bradley Johnston, w 47 Fr Dil, partially obstructing lower esoph ring, (-)H.  pylori, (+) GERD wo EOE, sm 1-2mm erosions sugg of chemical gastritis,    UPPER GASTROINTESTINAL ENDOSCOPY  05/04/2021    Dr Dinorah Dow weighted Bougie dilator-54 Fr    WISDOM TOOTH EXTRACTION         PFHx:  Family History   Problem Relation Age of Onset    Diabetes Maternal Grandmother     Cancer Paternal Grandfather        SocialHx:  Social History     Tobacco Use    Smoking status: Never Smoker    Smokeless tobacco: Never Used   Substance Use Topics    Alcohol use: Yes     Comment: occ       Allergies: Allergies   Allergen Reactions    Keflex [Cephalexin] Hives       Medications:  Current Outpatient Medications   Medication Sig Dispense Refill    amphetamine-dextroamphetamine (ADDERALL XR) 20 MG extended release capsule Take 1 capsule by mouth every morning for 30 days. 30 capsule 0    cloNIDine (CATAPRES) 0.1 MG tablet TAKE 1 TABLET BY MOUTH EVERY NIGHT AS NEEDED FOR INSOMNIA 90 tablet 1    fluticasone (FLONASE) 50 MCG/ACT nasal spray 2 sprays by Nasal route daily 16 g 3    FLUoxetine (PROZAC) 20 MG capsule Take 1 capsule by mouth daily 90 capsule 3    omeprazole (PRILOSEC) 40 MG delayed release capsule TAKE ONE CAPSULE BY MOUTH ONCE DAILY. TAKE FIRST THING DAILY ON AN EMPTY STOMACH 30 capsule 11    ciclopirox (PENLAC) 8 % solution Apply topically greater than 8 hours before showering. Clean nails with alcohol weekly. 1 Bottle 3    diclofenac sodium (VOLTAREN) 1 % GEL Apply 2 g topically 4 times daily 2 Tube 1    CRANBERRY EXTRACT PO Take by mouth       No current facility-administered medications for this visit. Objective:   PE:  /80   Pulse 86   Temp 97.6 °F (36.4 °C) (Temporal)   Resp 20   Ht 5' 11\" (1.803 m)   Wt 199 lb (90.3 kg)   SpO2 98%   BMI 27.75 kg/m²   Physical Exam  Constitutional:       General: She is not in acute distress. Appearance: Normal appearance. HENT:      Head: Normocephalic. Nose: Nose normal.      Mouth/Throat:      Mouth: Mucous membranes are moist.      Pharynx: Oropharynx is clear. No oropharyngeal exudate or posterior oropharyngeal erythema. Eyes:      General: No scleral icterus. Extraocular Movements: Extraocular movements intact. Conjunctiva/sclera: Conjunctivae normal.   Cardiovascular:      Rate and Rhythm: Normal rate and regular rhythm. Pulses: Normal pulses. Heart sounds: No murmur heard.       Pulmonary:      Effort: Pulmonary effort is normal.      Breath sounds: Normal breath sounds. Musculoskeletal:         General: Normal range of motion. Cervical back: Normal range of motion. Skin:     General: Skin is warm and dry. Capillary Refill: Capillary refill takes less than 2 seconds. Neurological:      General: No focal deficit present. Mental Status: She is alert and oriented to person, place, and time. Psychiatric:         Mood and Affect: Mood normal.         Behavior: Behavior normal.         Thought Content: Thought content normal.            Assessment & Plan   Samir Cardozo was seen today for establish care. Diagnoses and all orders for this visit:    Adult ADHD (attention deficit hyperactivity disorder)  -     amphetamine-dextroamphetamine (ADDERALL XR) 20 MG extended release capsule; Take 1 capsule by mouth every morning for 30 days.  -Recent UDS/med contract up to date  -. LION was reviewed today per office protocol. Report shows No discrepancies. Fill pattern is consistent from single provider(s) at single pharmacy(s). Presciption Escribed     Anxiety and depression  -Continue Prozac    Chronic gastroesophageal reflux disease  -Continue Prilosec    Well woman exam  -Recommended patient have a pelvic exam/Pap smear. Patient would like to see Dr. Goran Steel again  -     External Referral To Ob-Gyn        Return in about 3 months (around 9/30/2022) for ADHD. All questions were answered. Medications, including possible adverse effects, and instructions were reviewed and  understanding was confirmed. Follow-up recommendations, including when to contact or return to office (ie; if symptoms worsen or fail to improve), were discussed and acknowledged.     Electronically signed by Jose Amado MD on 6/30/22 at 3:51 PM CDT

## 2022-07-19 ENCOUNTER — OFFICE VISIT (OUTPATIENT)
Dept: PRIMARY CARE CLINIC | Age: 32
End: 2022-07-19
Payer: COMMERCIAL

## 2022-07-19 ENCOUNTER — TELEPHONE (OUTPATIENT)
Dept: PRIMARY CARE CLINIC | Age: 32
End: 2022-07-19

## 2022-07-19 ENCOUNTER — HOSPITAL ENCOUNTER (OUTPATIENT)
Dept: GENERAL RADIOLOGY | Age: 32
Discharge: HOME | End: 2022-07-19
Payer: COMMERCIAL

## 2022-07-19 VITALS
HEART RATE: 95 BPM | HEIGHT: 71 IN | DIASTOLIC BLOOD PRESSURE: 70 MMHG | BODY MASS INDEX: 27.35 KG/M2 | SYSTOLIC BLOOD PRESSURE: 120 MMHG | OXYGEN SATURATION: 98 % | TEMPERATURE: 97.5 F | WEIGHT: 195.4 LBS

## 2022-07-19 DIAGNOSIS — M62.838 MUSCLE SPASM: ICD-10-CM

## 2022-07-19 DIAGNOSIS — M54.2 NECK PAIN: Primary | ICD-10-CM

## 2022-07-19 DIAGNOSIS — M54.2 NECK PAIN: ICD-10-CM

## 2022-07-19 DIAGNOSIS — M25.512 BILATERAL SHOULDER PAIN, UNSPECIFIED CHRONICITY: ICD-10-CM

## 2022-07-19 DIAGNOSIS — M25.511 BILATERAL SHOULDER PAIN, UNSPECIFIED CHRONICITY: ICD-10-CM

## 2022-07-19 PROCEDURE — 99214 OFFICE O/P EST MOD 30 MIN: CPT | Performed by: NURSE PRACTITIONER

## 2022-07-19 PROCEDURE — 72040 X-RAY EXAM NECK SPINE 2-3 VW: CPT | Performed by: RADIOLOGY

## 2022-07-19 PROCEDURE — 72040 X-RAY EXAM NECK SPINE 2-3 VW: CPT

## 2022-07-19 RX ORDER — TIZANIDINE 4 MG/1
4 TABLET ORAL EVERY 8 HOURS PRN
Qty: 30 TABLET | Refills: 0 | Status: SHIPPED | OUTPATIENT
Start: 2022-07-19 | End: 2022-08-23

## 2022-07-19 NOTE — TELEPHONE ENCOUNTER
----- Message from MICAELA Cerrato CNP sent at 7/19/2022 12:37 PM CDT -----  Please let patient know the x-ray of her cervical spine shows a straightened curvature in her cervical spine but the disc spaces are well-preserved. No other abnormality seen at this time. Do exercises as we discussed at your visit.

## 2022-07-19 NOTE — PROGRESS NOTES
[facility]   [address] [place] [address], [facility] hospital     Phone:  [phone]  Fax:  [phone]      Leo Damon is a 28 y.o. female who presents today for her medical conditions/complaints as noted below. Leo Damon is c/o of Neck Pain and Shoulder Pain      Chief Complaint   Patient presents with    Neck Pain    Shoulder Pain       HPI:     HPI     Patient reports neck and shoulder pain since last Wednesday. She reports a flare up on her neck and shoulder. Has tried ice, aleve, advil, rates pain a 9.5/10 and describes pain as \"piercing, numbness, radiating. \" Denies any pain radiating down her arms. Patient reports when she has the pain it gives her headaches. The headaches last 1-2 hours and are predominately in the front of her head. Patient had xrays at [facility] about a year ago. Reports she had not recently been seeing the chiropractor until last week. Reports she last saw the chiropractor 10/21. Reports she has done xrays, chiropractor, and physical therapy last year with some relief, but is not reoccurring. Reports the pain does ease up at times, but throughout the day it worsens. Past Medical History:   Diagnosis Date    Anxiety and depression 1/20/2019    Anxiety and depression     GERD (gastroesophageal reflux disease)         Past Surgical History:   Procedure Laterality Date    CHOLECYSTECTOMY      UPPER GASTROINTESTINAL ENDOSCOPY N/A 05/04/2021    Dr Reymundo St, w 47 Fr Dil, partially obstructing lower esoph ring, (-)H.  pylori, (+) GERD wo EOE, sm 1-2mm erosions sugg of chemical gastritis,    UPPER GASTROINTESTINAL ENDOSCOPY  05/04/2021    Dr Anthony Amin weighted Bougie dilator-54 Fr    WISDOM TOOTH EXTRACTION         Social History     Tobacco Use    Smoking status: Never    Smokeless tobacco: Never   Substance Use Topics    Alcohol use: Yes     Comment: occ        Current Outpatient Medications   Medication Sig Dispense Refill    tiZANidine (ZANAFLEX) 4 MG tablet Take 1 tablet by mouth every 8 hours as needed (muscle spasms) 30 tablet 0    amphetamine-dextroamphetamine (ADDERALL XR) 20 MG extended release capsule Take 1 capsule by mouth every morning for 30 days. 30 capsule 0    cloNIDine (CATAPRES) 0.1 MG tablet TAKE 1 TABLET BY MOUTH EVERY NIGHT AS NEEDED FOR INSOMNIA 90 tablet 1    fluticasone (FLONASE) 50 MCG/ACT nasal spray 2 sprays by Nasal route daily 16 g 3    FLUoxetine (PROZAC) 20 MG capsule Take 1 capsule by mouth daily 90 capsule 3    omeprazole (PRILOSEC) 40 MG delayed release capsule TAKE ONE CAPSULE BY MOUTH ONCE DAILY. TAKE FIRST THING DAILY ON AN EMPTY STOMACH 30 capsule 11    diclofenac sodium (VOLTAREN) 1 % GEL Apply 2 g topically 4 times daily 2 Tube 1    CRANBERRY EXTRACT PO Take by mouth       No current facility-administered medications for this visit. Allergies   Allergen Reactions    Keflex [Cephalexin] Hives       Family History   Problem Relation Age of Onset    Diabetes Maternal Grandmother     Cancer Paternal Grandfather                Subjective:      Review of Systems   Constitutional:  Negative for activity change and fever. HENT:  Negative for congestion, ear pain and sore throat. Respiratory:  Negative for cough, chest tightness and shortness of breath. Cardiovascular:  Negative for chest pain. Gastrointestinal:  Negative for abdominal pain, diarrhea, nausea and vomiting. Genitourinary:  Negative for frequency and urgency. Musculoskeletal:  Positive for myalgias and neck pain. Negative for arthralgias. Skin:  Negative for color change. Neurological:  Negative for dizziness, weakness and numbness. Psychiatric/Behavioral:  Negative for agitation. The patient is not nervous/anxious. Objective:     Physical Exam  Constitutional:       Appearance: Normal appearance. HENT:      Head: Normocephalic.       Right Ear: Tympanic membrane normal.      Left Ear: Tympanic membrane normal.      Nose: Nose normal. Mouth/Throat:      Mouth: Mucous membranes are moist.      Pharynx: Oropharynx is clear. Eyes:      Extraocular Movements: Extraocular movements intact. Pupils: Pupils are equal, round, and reactive to light. Cardiovascular:      Rate and Rhythm: Normal rate and regular rhythm. Pulses: Normal pulses. Heart sounds: Normal heart sounds. Pulmonary:      Effort: Pulmonary effort is normal.      Breath sounds: Normal breath sounds. Abdominal:      General: Bowel sounds are normal.      Palpations: Abdomen is soft. Musculoskeletal:         General: Normal range of motion. Right shoulder: Tenderness present. Left shoulder: Tenderness present. Cervical back: Normal range of motion. Tenderness present. Skin:     General: Skin is warm and dry. Neurological:      Mental Status: She is alert and oriented to person, place, and time. Psychiatric:         Mood and Affect: Mood normal.         Behavior: Behavior normal.       /70   Pulse 95   Temp 97.5 °F (36.4 °C) (Temporal)   Ht 5' 11\" (1.803 m)   Wt 195 lb 6.4 oz (88.6 kg)   SpO2 98%   BMI 27.25 kg/m²     Assessment:      Diagnosis Orders   1. Neck pain  XR CERVICAL SPINE (2-3 VIEWS)    tiZANidine (ZANAFLEX) 4 MG tablet      2. Muscle spasm  tiZANidine (ZANAFLEX) 4 MG tablet          No results found for this visit on 07/19/22. Plan:     1. Neck pain  Do shoulder exercises twice daily. Handouts given today. - XR CERVICAL SPINE (2-3 VIEWS); Future  - tiZANidine (ZANAFLEX) 4 MG tablet; Take 1 tablet by mouth every 8 hours as needed (muscle spasms)  Dispense: 30 tablet; Refill: 0    2. Muscle spasm    - tiZANidine (ZANAFLEX) 4 MG tablet; Take 1 tablet by mouth every 8 hours as needed (muscle spasms)  Dispense: 30 tablet; Refill: 0     3. Bilateral shoulder pain, unspecified chronicity  Do shoulder exercises twice daily. Handouts given today. Return in about 2 weeks (around 8/2/2022) for 2 week f/u .     Orders Placed This Encounter   Procedures    XR CERVICAL SPINE (2-3 VIEWS)     Standing Status:   Future     Number of Occurrences:   1     Standing Expiration Date:   7/19/2023     Order Specific Question:   Reason for exam:     Answer:   neck pain       Orders Placed This Encounter   Medications    tiZANidine (ZANAFLEX) 4 MG tablet     Sig: Take 1 tablet by mouth every 8 hours as needed (muscle spasms)     Dispense:  30 tablet     Refill:  0            Patient offered educational handouts and has had all questions answered. Patient voices understanding and agrees to plans along with risks and benefits of plan. Patient is instructed to continue prior meds, diet, and exercise plans as instructed. Patient agrees to follow up as instructed and sooner if needed. Patient agrees to go to ER if condition becomes emergent. EMR Dragon/transcription disclaimer: Some of this encounter note is an electronic transcription/translation of spoken language to printed text. The electronic translation of spoken language may permit erroneous, or at times, nonsensical words or phrases to be inadvertently transcribed.  Although I have reviewed the note for such errors, some may still exist.    Electronically signed by MICAELA Thakkar CNP on 7/26/2022 at 4:27 PM

## 2022-07-22 RX ORDER — OMEPRAZOLE 40 MG/1
CAPSULE, DELAYED RELEASE ORAL
Qty: 30 CAPSULE | Refills: 11 | OUTPATIENT
Start: 2022-07-22

## 2022-07-22 NOTE — TELEPHONE ENCOUNTER
Last visit as Mohsen Richardson aprn 4-1-21. No FU scheduled. Last Egd/Dil  5-4-21. I notified the Pharmacy this patient will need an OV for future medication refills.   sean

## 2022-07-26 ASSESSMENT — ENCOUNTER SYMPTOMS
NAUSEA: 0
CHEST TIGHTNESS: 0
COUGH: 0
DIARRHEA: 0
ABDOMINAL PAIN: 0
SHORTNESS OF BREATH: 0
VOMITING: 0
COLOR CHANGE: 0
SORE THROAT: 0

## 2022-07-27 ENCOUNTER — TELEPHONE (OUTPATIENT)
Dept: PRIMARY CARE CLINIC | Age: 32
End: 2022-07-27

## 2022-07-27 NOTE — TELEPHONE ENCOUNTER
Called patient,informed of [facility] papers will be at  to   Patient requesting different muscle relaxer.  Zanaflex just \"knocks me out\"

## 2022-07-28 DIAGNOSIS — M62.838 MUSCLE SPASM: Primary | ICD-10-CM

## 2022-07-28 RX ORDER — METHOCARBAMOL 500 MG/1
500 TABLET, FILM COATED ORAL 3 TIMES DAILY PRN
Qty: 30 TABLET | Refills: 0 | Status: SHIPPED | OUTPATIENT
Start: 2022-07-28 | End: 2022-08-07

## 2022-07-28 NOTE — TELEPHONE ENCOUNTER
Called patient and informed new rx sent to pharmacy and providers recommendations regarding not driving and no use of heavy equipment     Patient VU

## 2022-07-29 ENCOUNTER — TELEPHONE (OUTPATIENT)
Dept: PRIMARY CARE CLINIC | Age: 32
End: 2022-07-29

## 2022-07-29 NOTE — TELEPHONE ENCOUNTER
patient  call worried about time with vv no link was sent and 15min was approching and she was worried about a no show being on her chart . I went over an spoke with dr Carlos Morillo and Bhavik Colby and he has not been able to start any of these yet that she was fine . He would reach out to her be for the end of the day just be patient.

## 2022-08-03 RX ORDER — OMEPRAZOLE 40 MG/1
CAPSULE, DELAYED RELEASE ORAL
Qty: 30 CAPSULE | Refills: 11 | OUTPATIENT
Start: 2022-08-03

## 2022-08-03 NOTE — TELEPHONE ENCOUNTER
Last visit [facility] aprn as V V 4-1-21. No FU scheduled. Egd/Dil  5-4-21. LRF 7-8-21 JG # 30 x 11. I notified the Pharmacy this patient will need an office visit for future refills.   cma

## 2022-08-05 DIAGNOSIS — F90.9 ADULT ADHD (ATTENTION DEFICIT HYPERACTIVITY DISORDER): ICD-10-CM

## 2022-08-05 RX ORDER — DEXTROAMPHETAMINE SACCHARATE, AMPHETAMINE ASPARTATE MONOHYDRATE, DEXTROAMPHETAMINE SULFATE AND AMPHETAMINE SULFATE 5; 5; 5; 5 MG/1; MG/1; MG/1; MG/1
20 CAPSULE, EXTENDED RELEASE ORAL EVERY MORNING
Qty: 30 CAPSULE | Refills: 0 | Status: SHIPPED | OUTPATIENT
Start: 2022-08-05 | End: 2022-09-06 | Stop reason: SDUPTHER

## 2022-08-05 NOTE — TELEPHONE ENCOUNTER
Severo Blunt called to request a refill on her medication. Last office visit : 7/29/2022   Next office visit : 8/19/2022     Last UDS:   Amphetamine Screen, Urine   Date Value Ref Range Status   02/25/2022 +  Final     Barbiturate Screen, Urine   Date Value Ref Range Status   02/25/2022 -  Final     Benzodiazepine Screen, Urine   Date Value Ref Range Status   02/25/2022 -  Final     Buprenorphine Urine   Date Value Ref Range Status   02/25/2022 -  Final     Cocaine Metabolite Screen, Urine   Date Value Ref Range Status   02/25/2022 -  Final     Gabapentin Screen, Urine   Date Value Ref Range Status   02/25/2022 -  Final     MDMA, Urine   Date Value Ref Range Status   02/25/2022 -  Final     Methamphetamine, Urine   Date Value Ref Range Status   02/25/2022 -  Final     Opiate Scrn, Ur   Date Value Ref Range Status   02/25/2022 -  Final     Oxycodone Screen, Ur   Date Value Ref Range Status   02/25/2022 -  Final     PCP Screen, Urine   Date Value Ref Range Status   02/25/2022 -  Final     Propoxyphene Screen, Urine   Date Value Ref Range Status   02/25/2022 -  Final     THC Screen, Urine   Date Value Ref Range Status   02/25/2022 -  Final     Tricyclic Antidepressants, Urine   Date Value Ref Range Status   02/25/2022 -  Final       Last Jessica Favor: 3/23/22  Medication Contract: 3/19/21   Last Fill: 6/30/22    Requested Prescriptions     Pending Prescriptions Disp Refills    amphetamine-dextroamphetamine (ADDERALL XR) 20 MG extended release capsule 30 capsule 0     Sig: Take 1 capsule by mouth every morning for 30 days. Please approve or refuse this medication.    [employer]

## 2022-08-11 ENCOUNTER — HOSPITAL ENCOUNTER (OUTPATIENT)
Dept: MRI IMAGING | Age: 32
Discharge: HOME | End: 2022-08-11
Payer: COMMERCIAL

## 2022-08-11 DIAGNOSIS — M54.12 CERVICAL RADICULOPATHY: ICD-10-CM

## 2022-08-11 PROCEDURE — 72141 MRI NECK SPINE W/O DYE: CPT | Performed by: RADIOLOGY

## 2022-08-11 PROCEDURE — 72141 MRI NECK SPINE W/O DYE: CPT

## 2022-08-23 ENCOUNTER — OFFICE VISIT (OUTPATIENT)
Dept: PRIMARY CARE CLINIC | Age: 32
End: 2022-08-23
Payer: COMMERCIAL

## 2022-08-23 VITALS
HEART RATE: 101 BPM | SYSTOLIC BLOOD PRESSURE: 122 MMHG | OXYGEN SATURATION: 99 % | WEIGHT: 196 LBS | HEIGHT: 71 IN | TEMPERATURE: 97.2 F | BODY MASS INDEX: 27.44 KG/M2 | DIASTOLIC BLOOD PRESSURE: 66 MMHG

## 2022-08-23 DIAGNOSIS — M54.12 CERVICAL RADICULOPATHY: Primary | ICD-10-CM

## 2022-08-23 PROCEDURE — 99214 OFFICE O/P EST MOD 30 MIN: CPT | Performed by: STUDENT IN AN ORGANIZED HEALTH CARE EDUCATION/TRAINING PROGRAM

## 2022-08-23 RX ORDER — GABAPENTIN 300 MG/1
300 CAPSULE ORAL 3 TIMES DAILY
Qty: 90 CAPSULE | Refills: 1 | Status: SHIPPED | OUTPATIENT
Start: 2022-08-23 | End: 2022-09-10 | Stop reason: SDUPTHER

## 2022-08-23 RX ORDER — METHOCARBAMOL 750 MG/1
750 TABLET, FILM COATED ORAL 4 TIMES DAILY
Qty: 40 TABLET | Refills: 0 | Status: SHIPPED | OUTPATIENT
Start: 2022-08-23 | End: 2022-09-02

## 2022-08-23 NOTE — LETTER
[facility]  Phone: [phone]  Fax: [phone]    Tami Gutierrez MD        August 23, 2022      To whom it concerns,    Pt was seen today for FMLA-related care.        Sincerely,        Tami Gutierrez MD

## 2022-08-26 ENCOUNTER — TELEPHONE (OUTPATIENT)
Dept: NEUROSURGERY | Age: 32
End: 2022-08-26

## 2022-08-29 ENCOUNTER — TELEPHONE (OUTPATIENT)
Dept: NEUROSURGERY | Age: 32
End: 2022-08-29

## 2022-08-29 NOTE — TELEPHONE ENCOUNTER
[facility] New Patient Questionnaire    Diagnosis/Reason for Referral?    Cervical radiculopathy    2. Who is completing questionnaire? Patient  X Caregiver Family      3. Has the patient had any previous spinal/brain surgeries? NO        A. If yes, what is the name of the facility in which the surgery was performed? B. Procedure/Surgery performed? C. Who was the surgeon? D. When was the surgery? MM/YY       E. Did the patient improve after the surgery? 4. Is this a second opinion? If yes, Dr. Hutchison would like to review patient first before making the appointment. 5. Have MRI Images been obtain within the last year? Yes X  No      XR  CT     If yes, where was the imaging performed? [facility]     If yes, what part of the body? Lumbar  Cervical  X Thoracic  Brain     If yes, when was it obtained? 08-    Note: if the scan was performed at a facility other than [facility], the disc will need to be brought to the appointment or we need to reach out to obtain the disc. A. Was the patient instructed to provide the disc? Yes   No X      8. Has the patient had a NCV/EMG within the last year? Yes  No     If yes, where was it performed and date? MM/YY  Location:      9. Has the patient been to Physical Therapy? Yes  No X     If yes, what location, how long attended, and last visit? Location:        Therapy Lasted:    Date of Last Visit:      10. Has the patient been to Pain Management? Yes  No X     If yes, what location and last visit     Location:   Last Visit:   Is it helping?

## 2022-09-06 ENCOUNTER — OFFICE VISIT (OUTPATIENT)
Dept: NEUROSURGERY | Age: 32
End: 2022-09-06
Payer: COMMERCIAL

## 2022-09-06 VITALS
HEART RATE: 77 BPM | RESPIRATION RATE: 18 BRPM | SYSTOLIC BLOOD PRESSURE: 108 MMHG | WEIGHT: 196 LBS | BODY MASS INDEX: 27.44 KG/M2 | DIASTOLIC BLOOD PRESSURE: 72 MMHG | HEIGHT: 71 IN

## 2022-09-06 DIAGNOSIS — M50.30 DDD (DEGENERATIVE DISC DISEASE), CERVICAL: ICD-10-CM

## 2022-09-06 DIAGNOSIS — M79.602 LEFT ARM PAIN: ICD-10-CM

## 2022-09-06 DIAGNOSIS — M54.2 NECK PAIN: ICD-10-CM

## 2022-09-06 DIAGNOSIS — M50.20 CERVICAL DISC HERNIATION: Primary | ICD-10-CM

## 2022-09-06 DIAGNOSIS — Z01.818 PRE-OP EXAMINATION: ICD-10-CM

## 2022-09-06 DIAGNOSIS — R20.0 LEFT ARM NUMBNESS: ICD-10-CM

## 2022-09-06 DIAGNOSIS — F90.9 ADULT ADHD (ATTENTION DEFICIT HYPERACTIVITY DISORDER): ICD-10-CM

## 2022-09-06 PROCEDURE — 99205 OFFICE O/P NEW HI 60 MIN: CPT | Performed by: NURSE PRACTITIONER

## 2022-09-06 RX ORDER — ACETAMINOPHEN 325 MG/1
1000 TABLET ORAL ONCE
Status: CANCELLED | OUTPATIENT
Start: 2022-09-06 | End: 2022-09-06

## 2022-09-06 RX ORDER — SODIUM CHLORIDE 0.9 % (FLUSH) 0.9 %
5-40 SYRINGE (ML) INJECTION PRN
Status: CANCELLED | OUTPATIENT
Start: 2022-09-06

## 2022-09-06 RX ORDER — METHOCARBAMOL 750 MG/1
750 TABLET, FILM COATED ORAL 4 TIMES DAILY
COMMUNITY
End: 2022-10-19 | Stop reason: SDUPTHER

## 2022-09-06 RX ORDER — SODIUM CHLORIDE 0.9 % (FLUSH) 0.9 %
5-40 SYRINGE (ML) INJECTION EVERY 12 HOURS SCHEDULED
Status: CANCELLED | OUTPATIENT
Start: 2022-09-06

## 2022-09-06 RX ORDER — SODIUM CHLORIDE 9 MG/ML
INJECTION, SOLUTION INTRAVENOUS PRN
Status: CANCELLED | OUTPATIENT
Start: 2022-09-06

## 2022-09-06 ASSESSMENT — ENCOUNTER SYMPTOMS
RESPIRATORY NEGATIVE: 1
GASTROINTESTINAL NEGATIVE: 1
EYES NEGATIVE: 1

## 2022-09-06 NOTE — PROGRESS NOTES
Review of Systems   Constitutional: Negative. HENT: Negative. Eyes: Negative. Respiratory: Negative. Cardiovascular: Negative. Gastrointestinal: Negative. Genitourinary: Negative. Musculoskeletal:  Positive for joint pain, myalgias and neck pain. Skin: Negative. Neurological:  Positive for tingling and weakness. Endo/Heme/Allergies: Negative. Psychiatric/Behavioral: Negative.

## 2022-09-06 NOTE — PROGRESS NOTES
[facility]  Office Visit      Chief Complaint   Patient presents with    New Patient     Establishing care     Results     MRI Cervical Spine (8/11/2022)    Neck Pain     Patient states her pain has gradually came on over the past 6 years. She said about 6 weeks ago is when she had a bad flare up. She has tried OTC medications but they didn't provide much relief. She is taking Gabapentin and robaxin to help manage the pain. Numbness     Patient states she does have numbness/tingling in her left arm/hand. 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 The pain does radiate into the LEFT scapula, triceps, forearm. No pain on the right. She feels that her LLE is off as well. Her pain is mostly located in the posterior cervical spine. The patient complains of numbness of the left arm and hand. She does not have numbness in the fingertips, trouble using hands to perform fine motor tasks or ataxia. Physical activity and moving her upper extremities exacerbates her pain. Has some mild low back pain and LLE pain. She does have some stress induced urinary incontinence likely not related to spine pathology. The patient has underwent a non-operative treatment course that has included:  NSAIDs (ibuprofen, diclofenac)  Muscle Relaxers (Robaxin)  Gabapentin  Physical Therapy (did not help)  IMAC injections (did not help)      Of note she does not use tobacco and does not take blood thinning medications.                Past Medical History:   Diagnosis Date    Anxiety and depression 1/20/2019    Anxiety and depression     GERD (gastroesophageal reflux disease)        Past Surgical History:   Procedure Laterality Date    CHOLECYSTECTOMY      UPPER GASTROINTESTINAL ENDOSCOPY N/A 05/04/2021    Dr Alessio Graham, w 47 Fr Dil, partially obstructing lower esoph ring, (-)H. pylori, (+) GERD wo EOE, sm 1-2mm erosions sugg of chemical gastritis,    UPPER GASTROINTESTINAL ENDOSCOPY  05/04/2021    Dr Abdirashid Sheriff weighted Bougie dilator-54 Fr    WISDOM TOOTH EXTRACTION         Current Outpatient Medications   Medication Sig Dispense Refill    methocarbamol (ROBAXIN) 750 MG tablet Take 750 mg by mouth 4 times daily      gabapentin (NEURONTIN) 300 MG capsule Take 1 capsule by mouth 3 times daily for 30 days. 90 capsule 1    cloNIDine (CATAPRES) 0.1 MG tablet TAKE 1 TABLET BY MOUTH EVERY NIGHT AS NEEDED FOR INSOMNIA 90 tablet 1    fluticasone (FLONASE) 50 MCG/ACT nasal spray 2 sprays by Nasal route daily 16 g 3    FLUoxetine (PROZAC) 20 MG capsule Take 1 capsule by mouth daily 90 capsule 3    omeprazole (PRILOSEC) 40 MG delayed release capsule TAKE ONE CAPSULE BY MOUTH ONCE DAILY. TAKE FIRST THING DAILY ON AN EMPTY STOMACH 30 capsule 11    diclofenac sodium (VOLTAREN) 1 % GEL Apply 2 g topically 4 times daily 2 Tube 1    CRANBERRY EXTRACT PO Take by mouth      amphetamine-dextroamphetamine (ADDERALL XR) 20 MG extended release capsule Take 1 capsule by mouth every morning for 30 days. 30 capsule 0     No current facility-administered medications for this visit. Allergies:  Keflex [cephalexin]    Social History:   Social History     Tobacco Use   Smoking Status Never   Smokeless Tobacco Never     Social History     Substance and Sexual Activity   Alcohol Use Yes    Comment: occ         Family History:   Family History   Problem Relation Age of Onset    Diabetes Maternal Grandmother     Cancer Paternal Grandfather        REVIEW OF SYSTEMS:  Constitutional: Negative. HENT: Negative. Eyes: Negative. Respiratory: Negative. Cardiovascular: Negative. Gastrointestinal: Negative. Genitourinary: Negative. Musculoskeletal:  Positive for joint pain, myalgias and neck pain. Skin: Negative.     Neurological:  Positive for tingling and weakness. Endo/Heme/Allergies: Negative. Psychiatric/Behavioral: Negative. PHYSICAL EXAM:  Vitals:    09/06/22 1541   BP: 108/72   Pulse: 77   Resp: 18     Constitutional: appears well-developed and well-nourished. Eyes - conjunctiva normal.  Pupils react to light  Ear, nose, throat - hearing intact to finger rub, No scars, masses, or lesions over external nose or ears, no atrophy oftongue  Neck- symmetric, no masses noted, no jugular vein distension  Respiration- chest wall appears symmetric, good expansion, normal effort without use of accessory muscles  Musculoskeletal - no significant wasting of muscles noted, no bony deformities, gait no gross ataxia  Extremities- no clubbing, cyanosis oredema  Skin - warm, dry, and intact. No rash, erythema, or pallor. Psychiatric - mood, affect, and behavior appear normal.       Neurologic Examination  Awake, Alert and oriented x 4  Normal speech pattern, following commands    Motor:  RIGHT: hand grasp 5/5    finger extension 5/5    bicep 5/5    triceps 5/5    deltoid 5/5      iliopsoas 5/5    knee flexor 5/5    knee extension 5/5    EHL 5/5   dorsiflexion 5/5    plantar flexion 5/5    LEFT:   hand grasp 5/5    finger extension 5/5    bicep 5/5    triceps 5/5    deltoid 5/5      iliopsoas 5/5    knee flexor 5/5    knee extension 5/5    EHL 5/5   dorsiflexion 5/5    plantar flexion 5/5    No deficits to light touch or pinprick sensation  Reflexes are 2+ and symmetric  No clonus or Hoffmans sign  No myofacial tenderness to palpation  Normal Gait pattern        DATA and IMAGING:    Nursing/pcp notes, imaging, labs, and vitals reviewed.      PT,OT and/or speech notes reviewed    Lab Results   Component Value Date    WBC 6.4 03/22/2021    HGB 13.3 03/22/2021    HCT 40.4 03/22/2021    MCV 96.7 03/22/2021     03/22/2021     Lab Results   Component Value Date     03/22/2021    K 4.0 03/22/2021     03/22/2021    CO2 25 03/22/2021    BUN 11 03/22/2021    CREATININE 0.5 03/22/2021    GLUCOSE 77 03/22/2021    CALCIUM 9.1 03/22/2021    PROT 7.1 03/22/2021    LABALBU 4.5 03/22/2021    BILITOT 0.7 03/22/2021    ALKPHOS 62 03/22/2021    AST 19 03/22/2021    ALT 14 03/22/2021    LABGLOM >60 03/22/2021    GFRAA >59 03/22/2021    GLOB 2.5 01/13/2017   No results found for: INR, PROTIME      Narrative   NO PRIOR REPORT AVAILABLE   Exam: MRI OF THE CERVICAL SPINE WITHOUT CONTRAST   Clinical data:Severe neck pain, radiating down arms. Cervical radiculopathy. Patient states she has 'texting neck.' No known injury. No surgery. Technique: Multiplanar multisequence MRI of the cervical spine without contrast. Axial imaging is performed throughout the cervical spine. Prior studies: Radiographs of the cervical spine dated 07/19/22. Findings: Imaged posterior fossa is unremarkable. Craniocervical junction is intact. There is normal alignment without acute fracture or subluxation. Straightening of the physiologic cervical lordosis. Vertebral body and intervertebral disc heights are    maintained. There is normal marrow signal of the vertebrae. Cervical cord is in anatomic location. No abnormal signal involves the cord. No extra-axial masses. Surrounding soft tissues are unremarkable. Level by leveldisease is present as follows:   C1-C2:No significant osteoarthritis. C2-C3: There is no abnormally positioned disc material. There is no significant spinal canal, lateral recess, neural foramina compromise, or nerve impingement. C3-C4: Posterior bulging disc (1 mm). There is no significant spinal canal, lateral recess, neural foramina compromise, or nerve impingement. C4-C5: Posterior bulging disc (1.5 mm). There is no significant spinal canal, lateral recess, neural foramina compromise, or nerve impingement. C5-C6: Posterior bulging disc (2 mm). There is no significant spinal canal, lateral recess, neural foramina compromise, or nerve impingement.    C6-C7: Posterior bulging disc (2 mm) with left posterior spurring and mild to moderate left foraminal stenosis probably abutting the exiting left nerve root. There is no significant spinal canal, lateral recess, or right neural foraminal compromise. C7-T1: There is no abnormally positioned disc material. There is no significant spinal canal, lateral recess, neural foramina compromise, or nerve impingement. Impression   1. No acute osseous abnormality. 2.  Straightening of the physiologic cervical lordosis; may reflect muscle spasm. 3.  Multilevel posterior bulging discs; most prominent at C6-C7 with left posterior spurring and mild to moderate left foraminal stenosis probably abutting the exiting left nerve root. Recommendation:    Follow up as clinically indicated. Electronically Signed by Kaye Siemens MD at 11-Aug-2022 07:13:00 PM      I have personally reviewed these images and my interpretation is:  Straightening of the cervical spine  DDD throughout  C6-7 left lateral disc herniation resulting in severe left foraminal stenosis       ASSESSMENT:    Mara Soliz is a 28 y.o. female with complaints of posterior neck pain and LUE pain. ICD-10-CM    1. Cervical disc herniation  M50.20 APTT     CBC     Comprehensive Metabolic Panel     EKG 12 Lead     Protime-INR     Type and Screen     Urinalysis with Reflex to Culture     HCG Qualitative, Serum      2. DDD (degenerative disc disease), cervical  M50.30       3. Neck pain  M54.2       4. Left arm pain  M79.602       5. Left arm numbness  R20.0       6. Pre-op examination  Z01.818 XR CHEST (2 VW)            PLAN:  We have discussed and reviewed the results of the MRI cervical spine with Ms. Sandoval Jordan at length. We explained that she does have some DDD throughout her cervical spine which could contrite to neck pain.   She does have a disc herniation on the left that results in severe left foraminal stenosis that could be contributing to her left arm pain. Her LLE pain is likely not associated with this. She does have more neck pain than arm pain and we discussed that a surgery would be done to alleviate the arm pain and she would still likely have some remaining neck pain due to the degenerative disc disease. She has attempted multiple non-operative treatments without any good relief. We are going to offer her an artificial disc replacement due to the fact that she does not have severe facet arthropathy and there is no deformity. We feel that an ADR would be best for her age and pathology. She will need an artificial disc replacement of C6-7     We discussed risks, complications, and expectations, including but not limited to infection, paralysis, bowel and bladder dysfunction, possible need for revision procedure, persistent pain, spinal fluid leak, stroke and death. In addition, the benefits of the surgery were thoroughly discussed and the patient demonstrated a deep understanding. The patient wishes to proceed with surgical intervention. We will schedule for surgery in the near future.          Verda Mohs, APRN

## 2022-09-06 NOTE — H&P
[facility]  H&P      Chief Complaint   Patient presents with    New Patient     Establishing care     Results     MRI Cervical Spine (8/11/2022)    Neck Pain     Patient states her pain has gradually came on over the past 6 years. She said about 6 weeks ago is when she had a bad flare up. She has tried OTC medications but they didn't provide much relief. She is taking Gabapentin and robaxin to help manage the pain. Numbness     Patient states she does have numbness/tingling in her left arm/hand. 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 The pain does radiate into the LEFT scapula, triceps, forearm. No pain on the right. She feels that her LLE is off as well. Her pain is mostly located in the posterior cervical spine. The patient complains of numbness of the left arm and hand. She does not have numbness in the fingertips, trouble using hands to perform fine motor tasks or ataxia. Physical activity and moving her upper extremities exacerbates her pain. Has some mild low back pain and LLE pain. She does have some stress induced urinary incontinence likely not related to spine pathology. The patient has underwent a non-operative treatment course that has included:  NSAIDs (ibuprofen, diclofenac)  Muscle Relaxers (Robaxin)  Gabapentin  Physical Therapy (did not help)  IMAC injections (did not help)      Of note she does not use tobacco and does not take blood thinning medications.                Past Medical History:   Diagnosis Date    Anxiety and depression 1/20/2019    Anxiety and depression     GERD (gastroesophageal reflux disease)        Past Surgical History:   Procedure Laterality Date    CHOLECYSTECTOMY      UPPER GASTROINTESTINAL ENDOSCOPY N/A 05/04/2021    Dr Margaret Lopez, w 47 Fr Dil, partially obstructing lower esoph ring, (-)H. pylori, (+) GERD wo EOE, sm 1-2mm erosions sugg of chemical gastritis,    UPPER GASTROINTESTINAL ENDOSCOPY  05/04/2021    Dr Yvetta Collet weighted Bougie dilator-54 Fr    WISDOM TOOTH EXTRACTION         Current Outpatient Medications   Medication Sig Dispense Refill    methocarbamol (ROBAXIN) 750 MG tablet Take 750 mg by mouth 4 times daily      gabapentin (NEURONTIN) 300 MG capsule Take 1 capsule by mouth 3 times daily for 30 days. 90 capsule 1    cloNIDine (CATAPRES) 0.1 MG tablet TAKE 1 TABLET BY MOUTH EVERY NIGHT AS NEEDED FOR INSOMNIA 90 tablet 1    fluticasone (FLONASE) 50 MCG/ACT nasal spray 2 sprays by Nasal route daily 16 g 3    FLUoxetine (PROZAC) 20 MG capsule Take 1 capsule by mouth daily 90 capsule 3    omeprazole (PRILOSEC) 40 MG delayed release capsule TAKE ONE CAPSULE BY MOUTH ONCE DAILY. TAKE FIRST THING DAILY ON AN EMPTY STOMACH 30 capsule 11    diclofenac sodium (VOLTAREN) 1 % GEL Apply 2 g topically 4 times daily 2 Tube 1    CRANBERRY EXTRACT PO Take by mouth      amphetamine-dextroamphetamine (ADDERALL XR) 20 MG extended release capsule Take 1 capsule by mouth every morning for 30 days. 30 capsule 0     No current facility-administered medications for this visit. Allergies:  Keflex [cephalexin]    Social History:   Social History     Tobacco Use   Smoking Status Never   Smokeless Tobacco Never     Social History     Substance and Sexual Activity   Alcohol Use Yes    Comment: occ         Family History:   Family History   Problem Relation Age of Onset    Diabetes Maternal Grandmother     Cancer Paternal Grandfather        REVIEW OF SYSTEMS:  Constitutional: Negative. HENT: Negative. Eyes: Negative. Respiratory: Negative. Cardiovascular: Negative. Gastrointestinal: Negative. Genitourinary: Negative. Musculoskeletal:  Positive for joint pain, myalgias and neck pain. Skin: Negative.     Neurological:  Positive for tingling and weakness. Endo/Heme/Allergies: Negative. Psychiatric/Behavioral: Negative. PHYSICAL EXAM:  Vitals:    09/06/22 1541   BP: 108/72   Pulse: 77   Resp: 18     Constitutional: appears well-developed and well-nourished. Eyes - conjunctiva normal.  Pupils react to light  Ear, nose, throat - hearing intact to finger rub, No scars, masses, or lesions over external nose or ears, no atrophy oftongue  Neck- symmetric, no masses noted, no jugular vein distension  Respiration- chest wall appears symmetric, good expansion, normal effort without use of accessory muscles  Musculoskeletal - no significant wasting of muscles noted, no bony deformities, gait no gross ataxia  Extremities- no clubbing, cyanosis oredema  Skin - warm, dry, and intact. No rash, erythema, or pallor. Psychiatric - mood, affect, and behavior appear normal.       Neurologic Examination  Awake, Alert and oriented x 4  Normal speech pattern, following commands    Motor:  RIGHT: hand grasp 5/5    finger extension 5/5    bicep 5/5    triceps 5/5    deltoid 5/5      iliopsoas 5/5    knee flexor 5/5    knee extension 5/5    EHL 5/5   dorsiflexion 5/5    plantar flexion 5/5    LEFT:   hand grasp 5/5    finger extension 5/5    bicep 5/5    triceps 5/5    deltoid 5/5      iliopsoas 5/5    knee flexor 5/5    knee extension 5/5    EHL 5/5   dorsiflexion 5/5    plantar flexion 5/5    No deficits to light touch or pinprick sensation  Reflexes are 2+ and symmetric  No clonus or Hoffmans sign  No myofacial tenderness to palpation  Normal Gait pattern        DATA and IMAGING:    Nursing/pcp notes, imaging, labs, and vitals reviewed.      PT,OT and/or speech notes reviewed    Lab Results   Component Value Date    WBC 6.4 03/22/2021    HGB 13.3 03/22/2021    HCT 40.4 03/22/2021    MCV 96.7 03/22/2021     03/22/2021     Lab Results   Component Value Date     03/22/2021    K 4.0 03/22/2021     03/22/2021    CO2 25 03/22/2021    BUN 11 03/22/2021    CREATININE 0.5 03/22/2021    GLUCOSE 77 03/22/2021    CALCIUM 9.1 03/22/2021    PROT 7.1 03/22/2021    LABALBU 4.5 03/22/2021    BILITOT 0.7 03/22/2021    ALKPHOS 62 03/22/2021    AST 19 03/22/2021    ALT 14 03/22/2021    LABGLOM >60 03/22/2021    GFRAA >59 03/22/2021    GLOB 2.5 01/13/2017   No results found for: INR, PROTIME      Narrative   NO PRIOR REPORT AVAILABLE   Exam: MRI OF THE CERVICAL SPINE WITHOUT CONTRAST   Clinical data:Severe neck pain, radiating down arms. Cervical radiculopathy. Patient states she has 'texting neck.' No known injury. No surgery. Technique: Multiplanar multisequence MRI of the cervical spine without contrast. Axial imaging is performed throughout the cervical spine. Prior studies: Radiographs of the cervical spine dated 07/19/22. Findings: Imaged posterior fossa is unremarkable. Craniocervical junction is intact. There is normal alignment without acute fracture or subluxation. Straightening of the physiologic cervical lordosis. Vertebral body and intervertebral disc heights are    maintained. There is normal marrow signal of the vertebrae. Cervical cord is in anatomic location. No abnormal signal involves the cord. No extra-axial masses. Surrounding soft tissues are unremarkable. Level by leveldisease is present as follows:   C1-C2:No significant osteoarthritis. C2-C3: There is no abnormally positioned disc material. There is no significant spinal canal, lateral recess, neural foramina compromise, or nerve impingement. C3-C4: Posterior bulging disc (1 mm). There is no significant spinal canal, lateral recess, neural foramina compromise, or nerve impingement. C4-C5: Posterior bulging disc (1.5 mm). There is no significant spinal canal, lateral recess, neural foramina compromise, or nerve impingement. C5-C6: Posterior bulging disc (2 mm). There is no significant spinal canal, lateral recess, neural foramina compromise, or nerve impingement.    C6-C7: Posterior bulging disc (2 mm) with left posterior spurring and mild to moderate left foraminal stenosis probably abutting the exiting left nerve root. There is no significant spinal canal, lateral recess, or right neural foraminal compromise. C7-T1: There is no abnormally positioned disc material. There is no significant spinal canal, lateral recess, neural foramina compromise, or nerve impingement. Impression   1. No acute osseous abnormality. 2.  Straightening of the physiologic cervical lordosis; may reflect muscle spasm. 3.  Multilevel posterior bulging discs; most prominent at C6-C7 with left posterior spurring and mild to moderate left foraminal stenosis probably abutting the exiting left nerve root. Recommendation:    Follow up as clinically indicated. Electronically Signed by Alize Gonzalez MD at 11-Aug-2022 07:13:00 PM      I have personally reviewed these images and my interpretation is:  Straightening of the cervical spine  DDD throughout  C6-7 left lateral disc herniation resulting in severe left foraminal stenosis       ASSESSMENT:    Lawrence Monsivais is a 28 y.o. female with complaints of posterior neck pain and LUE pain. ICD-10-CM    1. Cervical disc herniation  M50.20 APTT     CBC     Comprehensive Metabolic Panel     EKG 12 Lead     Protime-INR     Type and Screen     Urinalysis with Reflex to Culture     HCG Qualitative, Serum      2. DDD (degenerative disc disease), cervical  M50.30       3. Neck pain  M54.2       4. Left arm pain  M79.602       5. Left arm numbness  R20.0       6. Pre-op examination  Z01.818 XR CHEST (2 VW)            PLAN:  We have discussed and reviewed the results of the MRI cervical spine with Ms. Hawley at length. We explained that she does have some DDD throughout her cervical spine which could contrite to neck pain.   She does have a disc herniation on the left that results in severe left foraminal stenosis that could be contributing to her left arm pain. Her LLE pain is likely not associated with this. She does have more neck pain than arm pain and we discussed that a surgery would be done to alleviate the arm pain and she would still likely have some remaining neck pain due to the degenerative disc disease. She has attempted multiple non-operative treatments without any good relief. We are going to offer her an artificial disc replacement due to the fact that she does not have severe facet arthropathy and there is no deformity. We feel that an ADR would be best for her age and pathology. She will need an artificial disc replacement of C6-7     We discussed risks, complications, and expectations, including but not limited to infection, paralysis, bowel and bladder dysfunction, possible need for revision procedure, persistent pain, spinal fluid leak, stroke and death. In addition, the benefits of the surgery were thoroughly discussed and the patient demonstrated a deep understanding. The patient wishes to proceed with surgical intervention. We will schedule for surgery in the near future.          Vince Petersen, APRN

## 2022-09-07 PROBLEM — M50.20 DISPLACEMENT OF CERVICAL INTERVERTEBRAL DISC: Status: ACTIVE | Noted: 2022-09-07

## 2022-09-07 RX ORDER — DEXTROAMPHETAMINE SACCHARATE, AMPHETAMINE ASPARTATE MONOHYDRATE, DEXTROAMPHETAMINE SULFATE AND AMPHETAMINE SULFATE 5; 5; 5; 5 MG/1; MG/1; MG/1; MG/1
20 CAPSULE, EXTENDED RELEASE ORAL EVERY MORNING
Qty: 30 CAPSULE | Refills: 0 | Status: SHIPPED | OUTPATIENT
Start: 2022-09-07 | End: 2022-10-10 | Stop reason: SDUPTHER

## 2022-09-10 DIAGNOSIS — M54.12 CERVICAL RADICULOPATHY: ICD-10-CM

## 2022-09-12 ENCOUNTER — TELEPHONE (OUTPATIENT)
Dept: NEUROSURGERY | Age: 32
End: 2022-09-12

## 2022-09-12 NOTE — TELEPHONE ENCOUNTER
Shai Moncia called to request a refill on her medication. Last office visit : 8/23/2022   Next office visit : 11/29/2022     Last UDS:   Amphetamine Screen, Urine   Date Value Ref Range Status   02/25/2022 +  Final     Barbiturate Screen, Urine   Date Value Ref Range Status   02/25/2022 -  Final     Benzodiazepine Screen, Urine   Date Value Ref Range Status   02/25/2022 -  Final     Buprenorphine Urine   Date Value Ref Range Status   02/25/2022 -  Final     Cocaine Metabolite Screen, Urine   Date Value Ref Range Status   02/25/2022 -  Final     Gabapentin Screen, Urine   Date Value Ref Range Status   02/25/2022 -  Final     MDMA, Urine   Date Value Ref Range Status   02/25/2022 -  Final     Methamphetamine, Urine   Date Value Ref Range Status   02/25/2022 -  Final     Opiate Scrn, Ur   Date Value Ref Range Status   02/25/2022 -  Final     Oxycodone Screen, Ur   Date Value Ref Range Status   02/25/2022 -  Final     PCP Screen, Urine   Date Value Ref Range Status   02/25/2022 -  Final     Propoxyphene Screen, Urine   Date Value Ref Range Status   02/25/2022 -  Final     THC Screen, Urine   Date Value Ref Range Status   02/25/2022 -  Final     Tricyclic Antidepressants, Urine   Date Value Ref Range Status   02/25/2022 -  Final       Last Luiz Carole: 09-07-22  Medication Contract:     Last Fill: 08-23-22    Requested Prescriptions     Pending Prescriptions Disp Refills    gabapentin (NEURONTIN) 300 MG capsule 90 capsule 1     Sig: Take 1 capsule by mouth 3 times daily for 30 days. Please approve or refuse this medication.    Beth Decker MA

## 2022-09-12 NOTE — TELEPHONE ENCOUNTER
Patient was scheduled for surgery on 10/3/2022. She called stating she needs to cancel due to financial status at the moment. She asked if she could do it around Feb. 2023. Talked with Dr. Bull Nation and he said it was fine. I let patient know to just call us back when she is ready. Patient thanked me and voiced understanding.

## 2022-09-13 RX ORDER — GABAPENTIN 300 MG/1
300 CAPSULE ORAL 3 TIMES DAILY
Qty: 90 CAPSULE | Refills: 1 | Status: SHIPPED | OUTPATIENT
Start: 2022-09-13 | End: 2022-10-13

## 2022-09-26 NOTE — PROGRESS NOTES
[facility]  [address] [address] [place] [address]  Dept: [phone]  Dept Fax: [phone]  Loc: [phone]      Subjective:     Chief Complaint   Patient presents with    Follow-up     2 Wk F/U        HPI:  Anne Tyler is a 28 y.o. female presenting for    Pt was seen 2 weeks ago for radicular neck pain. She was started on gabapentin 300mg tid prn and MRI obtained. She states gabapentin has helped greatly with symptoms. Cervical MRI shows   Impression   1. No acute osseous abnormality. 2.  Straightening of the physiologic cervical lordosis; may reflect muscle spasm. 3.  Multilevel posterior bulging discs; most prominent at C6-C7 with left posterior spurring and mild to moderate left foraminal stenosis probably abutting the exiting left nerve root. Recommendation:    Follow up as clinically indicated. ROS:   Reviewed with patient and noted to be negative except that listed in HPI    PMHx:  Past Medical History:   Diagnosis Date    Anxiety and depression 1/20/2019    Anxiety and depression     GERD (gastroesophageal reflux disease)      Patient Active Problem List   Diagnosis    Anxiety and depression    Adult ADHD (attention deficit hyperactivity disorder)    Varicose veins of legs    Symptomatic reticular veins    Chronic gastroesophageal reflux disease    Displacement of cervical intervertebral disc       PSHx:  Past Surgical History:   Procedure Laterality Date    CHOLECYSTECTOMY      UPPER GASTROINTESTINAL ENDOSCOPY N/A 05/04/2021    Dr Pedrito Cooper, w 47 Fr Dil, partially obstructing lower esoph ring, (-)H.  pylori, (+) GERD wo EOE, sm 1-2mm erosions sugg of chemical gastritis,    UPPER GASTROINTESTINAL ENDOSCOPY  05/04/2021    Dr Darrell Guzman [facility] Bougie dilator-54 Fr    WISDOM TOOTH EXTRACTION         PFHx:  Family History   Problem Relation Age of Onset    Diabetes Maternal Grandmother     Cancer Paternal Grandfather        SocialHx:  Social History     Tobacco Use    Smoking status: Never    Smokeless tobacco: Never   Substance Use Topics    Alcohol use: Yes     Comment: occ       Allergies: Allergies   Allergen Reactions    Keflex [Cephalexin] Hives       Medications:  Current Outpatient Medications   Medication Sig Dispense Refill    gabapentin (NEURONTIN) 300 MG capsule Take 1 capsule by mouth 3 times daily for 30 days. 90 capsule 1    cloNIDine (CATAPRES) 0.1 MG tablet TAKE 1 TABLET BY MOUTH EVERY NIGHT AS NEEDED FOR INSOMNIA 90 tablet 1    fluticasone (FLONASE) 50 MCG/ACT nasal spray 2 sprays by Nasal route daily 16 g 3    FLUoxetine (PROZAC) 20 MG capsule Take 1 capsule by mouth daily 90 capsule 3    omeprazole (PRILOSEC) 40 MG delayed release capsule TAKE ONE CAPSULE BY MOUTH ONCE DAILY. TAKE FIRST THING DAILY ON AN EMPTY STOMACH 30 capsule 11    diclofenac sodium (VOLTAREN) 1 % GEL Apply 2 g topically 4 times daily 2 Tube 1    CRANBERRY EXTRACT PO Take by mouth      amphetamine-dextroamphetamine (ADDERALL XR) 20 MG extended release capsule Take 1 capsule by mouth every morning for 30 days. 30 capsule 0    methocarbamol (ROBAXIN) 750 MG tablet Take 750 mg by mouth 4 times daily       No current facility-administered medications for this visit. Objective:   PE:  /66   Pulse (!) 101   Temp 97.2 °F (36.2 °C)   Ht 5' 11\" (1.803 m)   Wt 196 lb (88.9 kg)   SpO2 99%   BMI 27.34 kg/m²   Physical Exam  Constitutional:       General: She is not in acute distress. Appearance: Normal appearance. HENT:      Head: Normocephalic. Nose: Nose normal.      Mouth/Throat:      Mouth: Mucous membranes are moist.      Pharynx: Oropharynx is clear. No oropharyngeal exudate or posterior oropharyngeal erythema. Eyes:      General: No scleral icterus. Extraocular Movements: Extraocular movements intact.       Conjunctiva/sclera: Conjunctivae normal.   Cardiovascular:      Rate and Rhythm: Normal rate and regular rhythm. Pulses: Normal pulses. Heart sounds: No murmur heard. Pulmonary:      Effort: Pulmonary effort is normal.      Breath sounds: Normal breath sounds. Musculoskeletal:         General: Normal range of motion. Cervical back: Normal range of motion. Skin:     General: Skin is warm and dry. Capillary Refill: Capillary refill takes less than 2 seconds. Neurological:      General: No focal deficit present. Mental Status: She is alert and oriented to person, place, and time. Comments: Neck flexion/rotation ROM limited 2/2 pain   Psychiatric:         Mood and Affect: Mood normal.         Behavior: Behavior normal.         Thought Content: Thought content normal.          Assessment & Plan   Ralph Castro was seen today for follow-up. Diagnoses and all orders for this visit:    Cervical radiculopathy  [facility] [place]  -     gabapentin (NEURONTIN) 300 MG capsule; Take 1 capsule by mouth 3 times daily for 30 days. -     methocarbamol (ROBAXIN-750) 750 MG tablet; Take 1 tablet by mouth 4 times daily for 10 days      Return in about 3 months (around 11/23/2022) for ADHD/neck f/u. All questions were answered. Medications, including possible adverse effects, and instructions were reviewed and  understanding was confirmed. Follow-up recommendations, including when to contact or return to office (ie; if symptoms worsen or fail to improve), were discussed and acknowledged.     Electronically signed by Annia Desai MD on 9/26/22 at 3:17 PM CDT

## 2022-10-10 DIAGNOSIS — F90.9 ADULT ADHD (ATTENTION DEFICIT HYPERACTIVITY DISORDER): ICD-10-CM

## 2022-10-11 RX ORDER — DEXTROAMPHETAMINE SACCHARATE, AMPHETAMINE ASPARTATE MONOHYDRATE, DEXTROAMPHETAMINE SULFATE AND AMPHETAMINE SULFATE 5; 5; 5; 5 MG/1; MG/1; MG/1; MG/1
20 CAPSULE, EXTENDED RELEASE ORAL EVERY MORNING
Qty: 30 CAPSULE | Refills: 0 | Status: SHIPPED | OUTPATIENT
Start: 2022-10-11 | End: 2022-10-31 | Stop reason: SDUPTHER

## 2022-10-11 NOTE — TELEPHONE ENCOUNTER
Enriqueta Castro called to request a refill on her medication. Last office visit : 8/23/2022   Next office visit : 11/29/2022     Last UDS: 02/25/22  Amphetamine Screen, Urine   Date Value Ref Range Status   02/25/2022 +  Final     Barbiturate Screen, Urine   Date Value Ref Range Status   02/25/2022 -  Final     Benzodiazepine Screen, Urine   Date Value Ref Range Status   02/25/2022 -  Final     Buprenorphine Urine   Date Value Ref Range Status   02/25/2022 -  Final     Cocaine Metabolite Screen, Urine   Date Value Ref Range Status   02/25/2022 -  Final     Gabapentin Screen, Urine   Date Value Ref Range Status   02/25/2022 -  Final     MDMA, Urine   Date Value Ref Range Status   02/25/2022 -  Final     Methamphetamine, Urine   Date Value Ref Range Status   02/25/2022 -  Final     Opiate Scrn, Ur   Date Value Ref Range Status   02/25/2022 -  Final     Oxycodone Screen, Ur   Date Value Ref Range Status   02/25/2022 -  Final     PCP Screen, Urine   Date Value Ref Range Status   02/25/2022 -  Final     Propoxyphene Screen, Urine   Date Value Ref Range Status   02/25/2022 -  Final     THC Screen, Urine   Date Value Ref Range Status   02/25/2022 -  Final     Tricyclic Antidepressants, Urine   Date Value Ref Range Status   02/25/2022 -  Final       Last Jennifer Pulling: 10/11/22  Medication Contract: needs updated    Last Fill: 09/07/22    Requested Prescriptions     Pending Prescriptions Disp Refills    amphetamine-dextroamphetamine (ADDERALL XR) 20 MG extended release capsule 30 capsule 0     Sig: Take 1 capsule by mouth every morning for 30 days. Please approve or refuse this medication.    Alec Davis MA

## 2022-10-14 DIAGNOSIS — M54.2 NECK PAIN: ICD-10-CM

## 2022-10-14 DIAGNOSIS — M62.838 MUSCLE SPASM: ICD-10-CM

## 2022-10-14 NOTE — TELEPHONE ENCOUNTER
Cherise Kapoor called requesting a refill of the below medication which has been pended for you:   Patient is asking to restart Tizanidine.    Requested Prescriptions     Pending Prescriptions Disp Refills    tiZANidine (ZANAFLEX) 4 MG tablet 30 tablet 1     Sig: Take 1 tablet by mouth every 8 hours as needed (muscle spasms)       Last Appointment Date: 8/23/2022  Next Appointment Date: 11/29/2022    Allergies   Allergen Reactions    Keflex [Cephalexin] Hives

## 2022-10-17 RX ORDER — TIZANIDINE 4 MG/1
4 TABLET ORAL EVERY 8 HOURS PRN
Qty: 30 TABLET | Refills: 1 | Status: SHIPPED | OUTPATIENT
Start: 2022-10-17

## 2022-10-19 RX ORDER — METHOCARBAMOL 750 MG/1
750 TABLET, FILM COATED ORAL 4 TIMES DAILY
Qty: 120 TABLET | Refills: 0 | Status: SHIPPED | OUTPATIENT
Start: 2022-10-19

## 2022-10-20 NOTE — TELEPHONE ENCOUNTER
Enriqueta Matthew called to request a refill on her medication.       Last office visit : 8/23/2022   Next office visit : 11/30/2022     Requested Prescriptions     Pending Prescriptions Disp Refills    cloNIDine (CATAPRES) 0.1 MG tablet [Pharmacy Med Name: CLONIDINE 0.1MG TABLETS] 90 tablet 1     Sig: TAKE 1 TABLET BY MOUTH EVERY NIGHT AS NEEDED FOR [address] [place], [place]

## 2022-10-24 RX ORDER — CLONIDINE HYDROCHLORIDE 0.1 MG/1
TABLET ORAL
Qty: 90 TABLET | Refills: 1 | Status: SHIPPED | OUTPATIENT
Start: 2022-10-24

## 2022-10-31 DIAGNOSIS — F90.9 ADULT ADHD (ATTENTION DEFICIT HYPERACTIVITY DISORDER): ICD-10-CM

## 2022-10-31 DIAGNOSIS — M54.12 CERVICAL RADICULOPATHY: ICD-10-CM

## 2022-11-02 RX ORDER — DEXTROAMPHETAMINE SACCHARATE, AMPHETAMINE ASPARTATE MONOHYDRATE, DEXTROAMPHETAMINE SULFATE AND AMPHETAMINE SULFATE 5; 5; 5; 5 MG/1; MG/1; MG/1; MG/1
20 CAPSULE, EXTENDED RELEASE ORAL EVERY MORNING
Qty: 30 CAPSULE | Refills: 0 | Status: SHIPPED | OUTPATIENT
Start: 2022-11-10 | End: 2022-12-10

## 2022-11-02 NOTE — TELEPHONE ENCOUNTER
Judy Nissen called to request a refill on her medication. Patient has appt on 11/30/22 will get new uds and medication contract  Last office visit : 8/23/2022   Next office visit : 10/31/2022     Last Mercer Cooks: 10/11/22  Medication Contract: needs updated    Last UDS: 2/25/22 needs updated  Last Rx: 10/11/22    Amphetamine Screen, Urine   Date Value Ref Range Status   02/25/2022 +  Final     Barbiturate Screen, Urine   Date Value Ref Range Status   02/25/2022 -  Final     Benzodiazepine Screen, Urine   Date Value Ref Range Status   02/25/2022 -  Final     Buprenorphine Urine   Date Value Ref Range Status   02/25/2022 -  Final     Cocaine Metabolite Screen, Urine   Date Value Ref Range Status   02/25/2022 -  Final     Gabapentin Screen, Urine   Date Value Ref Range Status   02/25/2022 -  Final     MDMA, Urine   Date Value Ref Range Status   02/25/2022 -  Final     Methamphetamine, Urine   Date Value Ref Range Status   02/25/2022 -  Final     Opiate Scrn, Ur   Date Value Ref Range Status   02/25/2022 -  Final     Oxycodone Screen, Ur   Date Value Ref Range Status   02/25/2022 -  Final     PCP Screen, Urine   Date Value Ref Range Status   02/25/2022 -  Final     Propoxyphene Screen, Urine   Date Value Ref Range Status   02/25/2022 -  Final     THC Screen, Urine   Date Value Ref Range Status   02/25/2022 -  Final     Tricyclic Antidepressants, Urine   Date Value Ref Range Status   02/25/2022 -  Final           Requested Prescriptions     Pending Prescriptions Disp Refills    amphetamine-dextroamphetamine (ADDERALL XR) 20 MG extended release capsule 30 capsule 0     Sig: Take 1 capsule by mouth every morning for 30 days. Please approve or refuse this medication.    Lauren Durán

## 2022-11-02 NOTE — TELEPHONE ENCOUNTER
Scott Gilmore called to request a refill on her medication. Last office visit : 8/23/2022   Next office visit : 11/30/2022     Last Debbe Beer: 10/11/22  Medication Contract: needs updated   Last UDS: 2/25/22 needs updated  Last Rx: 10/13/22    Amphetamine Screen, Urine   Date Value Ref Range Status   02/25/2022 +  Final     Barbiturate Screen, Urine   Date Value Ref Range Status   02/25/2022 -  Final     Benzodiazepine Screen, Urine   Date Value Ref Range Status   02/25/2022 -  Final     Buprenorphine Urine   Date Value Ref Range Status   02/25/2022 -  Final     Cocaine Metabolite Screen, Urine   Date Value Ref Range Status   02/25/2022 -  Final     Gabapentin Screen, Urine   Date Value Ref Range Status   02/25/2022 -  Final     MDMA, Urine   Date Value Ref Range Status   02/25/2022 -  Final     Methamphetamine, Urine   Date Value Ref Range Status   02/25/2022 -  Final     Opiate Scrn, Ur   Date Value Ref Range Status   02/25/2022 -  Final     Oxycodone Screen, Ur   Date Value Ref Range Status   02/25/2022 -  Final     PCP Screen, Urine   Date Value Ref Range Status   02/25/2022 -  Final     Propoxyphene Screen, Urine   Date Value Ref Range Status   02/25/2022 -  Final     THC Screen, Urine   Date Value Ref Range Status   02/25/2022 -  Final     Tricyclic Antidepressants, Urine   Date Value Ref Range Status   02/25/2022 -  Final           Requested Prescriptions     Pending Prescriptions Disp Refills    gabapentin (NEURONTIN) 300 MG capsule 90 capsule 1     Sig: Take 1 capsule by mouth 3 times daily for 30 days. Please approve or refuse this medication.    Lauren Barkley

## 2022-11-08 RX ORDER — GABAPENTIN 300 MG/1
300 CAPSULE ORAL 3 TIMES DAILY
Qty: 90 CAPSULE | Refills: 1 | Status: SHIPPED | OUTPATIENT
Start: 2022-11-12 | End: 2022-12-27 | Stop reason: SDUPTHER

## 2022-11-08 NOTE — TELEPHONE ENCOUNTER
LION was reviewed today per office protocol. Report shows No discrepancies. Fill pattern is consistent from single provider(s) at single pharmacy(s).

## 2022-11-15 ENCOUNTER — TELEPHONE (OUTPATIENT)
Dept: PRIMARY CARE CLINIC | Age: 32
End: 2022-11-15

## 2022-11-15 DIAGNOSIS — F90.9 ADULT ADHD (ATTENTION DEFICIT HYPERACTIVITY DISORDER): ICD-10-CM

## 2022-11-15 RX ORDER — DEXTROAMPHETAMINE SACCHARATE, AMPHETAMINE ASPARTATE MONOHYDRATE, DEXTROAMPHETAMINE SULFATE AND AMPHETAMINE SULFATE 5; 5; 5; 5 MG/1; MG/1; MG/1; MG/1
20 CAPSULE, EXTENDED RELEASE ORAL EVERY MORNING
Qty: 30 CAPSULE | Refills: 0 | OUTPATIENT
Start: 2022-11-15 | End: 2022-12-15

## 2022-11-15 NOTE — TELEPHONE ENCOUNTER
Left message on VM that patient needs to schedule a appointment with a new provider so we can continue to refill her medication.

## 2022-11-20 DIAGNOSIS — F41.9 ANXIETY AND DEPRESSION: ICD-10-CM

## 2022-11-20 DIAGNOSIS — F32.A ANXIETY AND DEPRESSION: ICD-10-CM

## 2022-11-22 ENCOUNTER — TELEPHONE (OUTPATIENT)
Dept: PRIMARY CARE CLINIC | Age: 32
End: 2022-11-22

## 2022-11-22 RX ORDER — FLUOXETINE HYDROCHLORIDE 20 MG/1
20 CAPSULE ORAL DAILY
Qty: 90 CAPSULE | Refills: 3 | OUTPATIENT
Start: 2022-11-22

## 2022-11-22 NOTE — TELEPHONE ENCOUNTER
Message left with patient to please call office and schedule with provider in office before she can have medication refilled.

## 2022-12-21 DIAGNOSIS — F90.9 ADULT ADHD (ATTENTION DEFICIT HYPERACTIVITY DISORDER): ICD-10-CM

## 2022-12-22 RX ORDER — DEXTROAMPHETAMINE SACCHARATE, AMPHETAMINE ASPARTATE MONOHYDRATE, DEXTROAMPHETAMINE SULFATE AND AMPHETAMINE SULFATE 5; 5; 5; 5 MG/1; MG/1; MG/1; MG/1
20 CAPSULE, EXTENDED RELEASE ORAL EVERY MORNING
Qty: 30 CAPSULE | Refills: 0 | Status: SHIPPED | OUTPATIENT
Start: 2022-12-22 | End: 2023-02-01 | Stop reason: SDUPTHER

## 2022-12-22 NOTE — TELEPHONE ENCOUNTER
Lavella Nissen called to request a refill on her medication. Last office visit : 8/23/2022   Next office visit : 12/27/2022     Last UDS:   Amphetamine Screen, Urine   Date Value Ref Range Status   02/25/2022 +  Final     Barbiturate Screen, Urine   Date Value Ref Range Status   02/25/2022 -  Final     Benzodiazepine Screen, Urine   Date Value Ref Range Status   02/25/2022 -  Final     Buprenorphine Urine   Date Value Ref Range Status   02/25/2022 -  Final     Cocaine Metabolite Screen, Urine   Date Value Ref Range Status   02/25/2022 -  Final     Gabapentin Screen, Urine   Date Value Ref Range Status   02/25/2022 -  Final     MDMA, Urine   Date Value Ref Range Status   02/25/2022 -  Final     Methamphetamine, Urine   Date Value Ref Range Status   02/25/2022 -  Final     Opiate Scrn, Ur   Date Value Ref Range Status   02/25/2022 -  Final     Oxycodone Screen, Ur   Date Value Ref Range Status   02/25/2022 -  Final     PCP Screen, Urine   Date Value Ref Range Status   02/25/2022 -  Final     Propoxyphene Screen, Urine   Date Value Ref Range Status   02/25/2022 -  Final     THC Screen, Urine   Date Value Ref Range Status   02/25/2022 -  Final     Tricyclic Antidepressants, Urine   Date Value Ref Range Status   02/25/2022 -  Final           Requested Prescriptions     Pending Prescriptions Disp Refills    amphetamine-dextroamphetamine (ADDERALL XR) 20 MG extended release capsule 30 capsule 0     Sig: Take 1 capsule by mouth every morning for 30 days. Please approve or refuse this medication.    Renard Sullivan LPN

## 2022-12-22 NOTE — TELEPHONE ENCOUNTER
Antonio Moore called to request a refill on her medication. Last office visit : 8/23/2022   Next office visit : 12/27/2022     Last UDS:   Amphetamine Screen, Urine   Date Value Ref Range Status   02/25/2022 +  Final     Barbiturate Screen, Urine   Date Value Ref Range Status   02/25/2022 -  Final     Benzodiazepine Screen, Urine   Date Value Ref Range Status   02/25/2022 -  Final     Buprenorphine Urine   Date Value Ref Range Status   02/25/2022 -  Final     Cocaine Metabolite Screen, Urine   Date Value Ref Range Status   02/25/2022 -  Final     Gabapentin Screen, Urine   Date Value Ref Range Status   02/25/2022 -  Final     MDMA, Urine   Date Value Ref Range Status   02/25/2022 -  Final     Methamphetamine, Urine   Date Value Ref Range Status   02/25/2022 -  Final     Opiate Scrn, Ur   Date Value Ref Range Status   02/25/2022 -  Final     Oxycodone Screen, Ur   Date Value Ref Range Status   02/25/2022 -  Final     PCP Screen, Urine   Date Value Ref Range Status   02/25/2022 -  Final     Propoxyphene Screen, Urine   Date Value Ref Range Status   02/25/2022 -  Final     THC Screen, Urine   Date Value Ref Range Status   02/25/2022 -  Final     Tricyclic Antidepressants, Urine   Date Value Ref Range Status   02/25/2022 -  Final       Last Maylon Toth: 10/11/22  Medication Contract: 3/19/21   Last Fill: 11/10/22    Requested Prescriptions     Pending Prescriptions Disp Refills    amphetamine-dextroamphetamine (ADDERALL XR) 20 MG extended release capsule 30 capsule 0     Sig: Take 1 capsule by mouth every morning for 30 days. Please approve or refuse this medication.    [place]

## 2022-12-27 ENCOUNTER — OFFICE VISIT (OUTPATIENT)
Dept: PRIMARY CARE CLINIC | Age: 32
End: 2022-12-27
Payer: COMMERCIAL

## 2022-12-27 VITALS
SYSTOLIC BLOOD PRESSURE: 102 MMHG | OXYGEN SATURATION: 99 % | BODY MASS INDEX: 26.6 KG/M2 | HEIGHT: 71 IN | WEIGHT: 190 LBS | TEMPERATURE: 97.3 F | HEART RATE: 79 BPM | DIASTOLIC BLOOD PRESSURE: 78 MMHG

## 2022-12-27 DIAGNOSIS — M54.12 CERVICAL RADICULOPATHY: ICD-10-CM

## 2022-12-27 DIAGNOSIS — Z79.899 MEDICATION MANAGEMENT: Primary | ICD-10-CM

## 2022-12-27 DIAGNOSIS — M62.838 MUSCLE SPASMS OF NECK: ICD-10-CM

## 2022-12-27 DIAGNOSIS — F40.10 SOCIAL ANXIETY DISORDER: ICD-10-CM

## 2022-12-27 DIAGNOSIS — F41.9 ANXIETY AND DEPRESSION: ICD-10-CM

## 2022-12-27 DIAGNOSIS — F32.A ANXIETY AND DEPRESSION: ICD-10-CM

## 2022-12-27 LAB
ALCOHOL URINE: NEGATIVE
AMPHETAMINE SCREEN, URINE: NORMAL
BARBITURATE SCREEN, URINE: NEGATIVE
BENZODIAZEPINE SCREEN, URINE: NEGATIVE
BUPRENORPHINE URINE: NEGATIVE
COCAINE METABOLITE SCREEN URINE: NEGATIVE
FENTANYL SCREEN, URINE: NEGATIVE
GABAPENTIN SCREEN, URINE: NEGATIVE
MDMA URINE: NEGATIVE
METHADONE SCREEN, URINE: NEGATIVE
METHAMPHETAMINE, URINE: NEGATIVE
OPIATE SCREEN URINE: NEGATIVE
OXYCODONE SCREEN URINE: NEGATIVE
PHENCYCLIDINE SCREEN URINE: NEGATIVE
PROPOXYPHENE SCREEN, URINE: NEGATIVE
SYNTHETIC CANNABINOIDS(K2) SCREEN, URINE: NEGATIVE
THC SCREEN, URINE: NEGATIVE
TRAMADOL SCREEN URINE: NEGATIVE
TRICYCLIC ANTIDEPRESSANTS, UR: NEGATIVE

## 2022-12-27 PROCEDURE — 99214 OFFICE O/P EST MOD 30 MIN: CPT | Performed by: FAMILY MEDICINE

## 2022-12-27 PROCEDURE — 80305 DRUG TEST PRSMV DIR OPT OBS: CPT | Performed by: FAMILY MEDICINE

## 2022-12-27 RX ORDER — FLUOXETINE HYDROCHLORIDE 20 MG/1
20 CAPSULE ORAL DAILY
Qty: 90 CAPSULE | Refills: 0 | Status: SHIPPED | OUTPATIENT
Start: 2022-12-27

## 2022-12-27 RX ORDER — METHOCARBAMOL 750 MG/1
750 TABLET, FILM COATED ORAL 3 TIMES DAILY
Qty: 90 TABLET | Refills: 0 | Status: SHIPPED | OUTPATIENT
Start: 2022-12-27

## 2022-12-27 RX ORDER — GABAPENTIN 300 MG/1
300 CAPSULE ORAL 3 TIMES DAILY
Qty: 90 CAPSULE | Refills: 0 | Status: SHIPPED | OUTPATIENT
Start: 2022-12-27 | End: 2023-01-26

## 2022-12-27 NOTE — PROGRESS NOTES
[facility]  [address] [address] [place] [address]  Dept: [phone]  Dept Fax: [phone]  Loc: [phone]      Subjective:     Chief Complaint   Patient presents with    Establish Care       HPI:  Claude St is a 28 y.o. female presents today to get establish care with me. She is a former pt of Pippa Rowe and Dr Ada Reese. She presents today for med refills. ROS:   Review of Systems   Constitutional: Negative. HENT: Negative. Endocrine: Negative. Genitourinary: Negative. Musculoskeletal:  Positive for myalgias and neck pain. Neurological: Negative. Psychiatric/Behavioral:          Anxiety and depression     PMHx:  Past Medical History:   Diagnosis Date    Anxiety and depression 1/20/2019    Anxiety and depression     GERD (gastroesophageal reflux disease)      Patient Active Problem List   Diagnosis    Anxiety and depression    Adult ADHD (attention deficit hyperactivity disorder)    Varicose veins of legs    Symptomatic reticular veins    Chronic gastroesophageal reflux disease    Displacement of cervical intervertebral disc       PSHx:  Past Surgical History:   Procedure Laterality Date    CHOLECYSTECTOMY      UPPER GASTROINTESTINAL ENDOSCOPY N/A 05/04/2021    Dr Lino Sanabria, w 47 Fr Dil, partially obstructing lower esoph ring, (-)H. pylori, (+) GERD wo EOE, sm 1-2mm erosions sugg of chemical gastritis,    UPPER GASTROINTESTINAL ENDOSCOPY  05/04/2021    Dr Koby espana Bougie dilator-54 Fr    WISDOM TOOTH EXTRACTION         PFHx:  Family History   Problem Relation Age of Onset    Diabetes Maternal Grandmother     Cancer Paternal Grandfather        SocialHx:  Social History     Tobacco Use    Smoking status: Never    Smokeless tobacco: Never   Substance Use Topics    Alcohol use: Yes     Comment: occ       Allergies:   Allergies   Allergen Reactions    Keflex [Cephalexin] Hives Medications:  Current Outpatient Medications   Medication Sig Dispense Refill    gabapentin (NEURONTIN) 300 MG capsule Take 1 capsule by mouth 3 times daily for 30 days. 90 capsule 0    methocarbamol (ROBAXIN) 750 MG tablet Take 1 tablet by mouth 3 times daily 90 tablet 0    FLUoxetine (PROZAC) 20 MG capsule Take 1 capsule by mouth daily 90 capsule 0    amphetamine-dextroamphetamine (ADDERALL XR) 20 MG extended release capsule Take 1 capsule by mouth every morning for 30 days. 30 capsule 0    cloNIDine (CATAPRES) 0.1 MG tablet TAKE 1 TABLET BY MOUTH EVERY NIGHT AS NEEDED FOR INSOMNIA 90 tablet 1    fluticasone (FLONASE) 50 MCG/ACT nasal spray 2 sprays by Nasal route daily 16 g 3    omeprazole (PRILOSEC) 40 MG delayed release capsule TAKE ONE CAPSULE BY MOUTH ONCE DAILY. TAKE FIRST THING DAILY ON AN EMPTY STOMACH 30 capsule 11    diclofenac sodium (VOLTAREN) 1 % GEL Apply 2 g topically 4 times daily 2 Tube 1    CRANBERRY EXTRACT PO Take by mouth       No current facility-administered medications for this visit. Objective:   PE:  /78   Pulse 79   Temp 97.3 °F (36.3 °C) (Temporal)   Ht 5' 11\" (1.803 m)   Wt 190 lb (86.2 kg)   SpO2 99%   BMI 26.50 kg/m²   Physical Exam  Constitutional:       General: She is not in acute distress. HENT:      Head: Normocephalic. Nose: Nose normal.      Mouth/Throat:      Mouth: Mucous membranes are moist.      Pharynx: Oropharynx is clear. No oropharyngeal exudate or posterior oropharyngeal erythema. Eyes:      General: No scleral icterus. Conjunctiva/sclera: Conjunctivae normal.      Pupils: Pupils are equal, round, and reactive to light. Cardiovascular:      Rate and Rhythm: Normal rate and regular rhythm. Pulses: Normal pulses. Heart sounds: Normal heart sounds. No murmur heard. Pulmonary:      Effort: Pulmonary effort is normal.      Breath sounds: Normal breath sounds. Abdominal:      Palpations: Abdomen is soft.       Tenderness: There is no abdominal tenderness. Musculoskeletal:         General: Normal range of motion. Cervical back: Neck supple. Skin:     General: Skin is warm and dry. Capillary Refill: Capillary refill takes less than 2 seconds. Neurological:      General: No focal deficit present. Mental Status: She is alert and oriented to person, place, and time. Comments: Neck flexion/rotation ROM limited 2/2 pain   Psychiatric:         Behavior: Behavior normal.          Assessment & Plan   Amanda Jerez was seen today for establish care. Diagnoses and all orders for this visit:    Medication management  -     POCT Rapid Drug Screen    Cervical radiculopathy  -     gabapentin (NEURONTIN) 300 MG capsule; Take 1 capsule by mouth 3 times daily for 30 days. -     Andriy Alvares MD, Neurosurgery, [place]    Anxiety and depression  -     FLUoxetine (PROZAC) 20 MG capsule; Take 1 capsule by mouth daily  -     [facility] [address] [place] [address]    Social anxiety disorder  -     FLUoxetine (PROZAC) 20 MG capsule; Take 1 capsule by mouth daily    Muscle spasms of neck  -     methocarbamol (ROBAXIN) 750 MG tablet; Take 1 tablet by mouth 3 times daily      Return in about 4 weeks (around 1/24/2023) for med check, med refills, routine preventative visit. All questions were answered. Medications, including possible adverse effects, and instructions were reviewed and  understanding was confirmed. Follow-up recommendations, including when to contact or return to office (ie; if symptoms worsen or fail to improve), were discussed and acknowledged.     Electronically signed by Kiley Lopes MD on 12/27/22 at 3:58 PM CST

## 2022-12-28 ENCOUNTER — TELEPHONE (OUTPATIENT)
Dept: NEUROSURGERY | Age: 32
End: 2022-12-28

## 2022-12-28 NOTE — TELEPHONE ENCOUNTER
Called and spoke with patient. I stated that I received a new patient referral for her for Dr Vear Sandoval but she has already established with Leslie Lee and Dr Shivani Yang, appt was 9/6/22. At that appt, they offered pt surgery of an artificial disc replacement of C6-7. She stated that she wanted to come to Dr Vera Sandoval for a second opinion. I stated that we cannot do that internal within the office since Dr Shivani Yang already offered her surgery. She stated that she will reach out to PCP for referral to [facility] or [address] [place] [address]. Pt promptly hung up the phone.

## 2023-01-03 ENCOUNTER — TELEPHONE (OUTPATIENT)
Dept: PSYCHIATRY | Age: 33
End: 2023-01-03

## 2023-01-03 NOTE — TELEPHONE ENCOUNTER
Called pt to schedule an appt from a referral    Scheduled with Dr. Eladio Perez for 01/31/23 @ 3.     Electronically signed by Claudell Echevaria, MA on 1/3/2023 at 3:40 PM

## 2023-01-04 ENCOUNTER — TELEPHONE (OUTPATIENT)
Dept: PSYCHIATRY | Age: 33
End: 2023-01-04

## 2023-01-04 NOTE — TELEPHONE ENCOUNTER
Pt called to cancel/reschedule her appt for 01/31/23 with Dr. Shyla Cantor because she won't be able to make that appt that day. Rescheduled for 02/01/23 @ 3.     Electronically signed by Vernon Guillory MA on 1/4/2023 at 9:44 AM

## 2023-01-28 DIAGNOSIS — F90.9 ADULT ADHD (ATTENTION DEFICIT HYPERACTIVITY DISORDER): ICD-10-CM

## 2023-01-28 RX ORDER — DEXTROAMPHETAMINE SACCHARATE, AMPHETAMINE ASPARTATE MONOHYDRATE, DEXTROAMPHETAMINE SULFATE AND AMPHETAMINE SULFATE 5; 5; 5; 5 MG/1; MG/1; MG/1; MG/1
20 CAPSULE, EXTENDED RELEASE ORAL EVERY MORNING
Qty: 30 CAPSULE | Refills: 0 | Status: CANCELLED | OUTPATIENT
Start: 2023-01-28 | End: 2023-02-27

## 2023-01-31 ENCOUNTER — TELEPHONE (OUTPATIENT)
Dept: PSYCHIATRY | Age: 33
End: 2023-01-31

## 2023-01-31 NOTE — TELEPHONE ENCOUNTER
Called pt for appointment reminder.   -left voicemail, requesting a return call      Electronically signed by Dionne Ricks MA on 1/31/2023 at 12:19 PM

## 2023-02-01 DIAGNOSIS — F90.9 ADULT ADHD (ATTENTION DEFICIT HYPERACTIVITY DISORDER): ICD-10-CM

## 2023-02-01 RX ORDER — DEXTROAMPHETAMINE SACCHARATE, AMPHETAMINE ASPARTATE MONOHYDRATE, DEXTROAMPHETAMINE SULFATE AND AMPHETAMINE SULFATE 5; 5; 5; 5 MG/1; MG/1; MG/1; MG/1
20 CAPSULE, EXTENDED RELEASE ORAL EVERY MORNING
Qty: 30 CAPSULE | Refills: 0 | Status: SHIPPED | OUTPATIENT
Start: 2023-02-01 | End: 2023-03-03

## 2023-02-01 NOTE — TELEPHONE ENCOUNTER
Wendi DEYSI Pulido called to request a refill on her medication.      Last office visit : 12/27/2022   Next office visit : Visit date not found     Last UDS:   Amphetamine Screen, Urine   Date Value Ref Range Status   12/27/2022 Postive  Final     Barbiturate Screen, Urine   Date Value Ref Range Status   12/27/2022 Negative  Final     Benzodiazepine Screen, Urine   Date Value Ref Range Status   12/27/2022 Negative  Final     Buprenorphine Urine   Date Value Ref Range Status   12/27/2022 Negative  Final     Cocaine Metabolite Screen, Urine   Date Value Ref Range Status   12/27/2022 Negative  Final     Gabapentin Screen, Urine   Date Value Ref Range Status   12/27/2022 Negative  Final     MDMA, Urine   Date Value Ref Range Status   12/27/2022 Negative  Final     Methamphetamine, Urine   Date Value Ref Range Status   12/27/2022 Negative  Final     Opiate Scrn, Ur   Date Value Ref Range Status   12/27/2022 Negative  Final     Oxycodone Screen, Ur   Date Value Ref Range Status   12/27/2022 Negative  Final     PCP Screen, Urine   Date Value Ref Range Status   12/27/2022 Negative  Final     Propoxyphene Screen, Urine   Date Value Ref Range Status   12/27/2022 Negative  Final     THC Screen, Urine   Date Value Ref Range Status   12/27/2022 Negative  Final     Tricyclic Antidepressants, Urine   Date Value Ref Range Status   12/27/2022 Negative  Final       Last Pedro Pablo: 01-  Medication Contract: 12-  Last Fill: 12-    Requested Prescriptions     Pending Prescriptions Disp Refills    amphetamine-dextroamphetamine (ADDERALL XR) 20 MG extended release capsule 30 capsule 0     Sig: Take 1 capsule by mouth every morning for 30 days.         Please approve or refuse this medication.   Yuni Traore MA

## 2023-02-02 DIAGNOSIS — F40.10 SOCIAL ANXIETY DISORDER: ICD-10-CM

## 2023-02-02 DIAGNOSIS — F41.9 ANXIETY AND DEPRESSION: ICD-10-CM

## 2023-02-02 DIAGNOSIS — F32.A ANXIETY AND DEPRESSION: ICD-10-CM

## 2023-02-03 NOTE — TELEPHONE ENCOUNTER
García Morrison called to request a refill on her medication.       Last office visit : 12/27/2022   Next office visit : Visit date not found     Requested Prescriptions     Pending Prescriptions Disp Refills    FLUoxetine (PROZAC) 20 MG capsule 90 capsule 0     Sig: Take 1 capsule by mouth daily            Wash LAUREL Solitario

## 2023-02-05 RX ORDER — FLUOXETINE HYDROCHLORIDE 20 MG/1
20 CAPSULE ORAL DAILY
Qty: 90 CAPSULE | Refills: 0 | Status: SHIPPED | OUTPATIENT
Start: 2023-02-05

## 2023-02-06 ENCOUNTER — TELEPHONE (OUTPATIENT)
Dept: PSYCHIATRY | Age: 33
End: 2023-02-06

## 2023-02-07 ENCOUNTER — TELEPHONE (OUTPATIENT)
Dept: PSYCHIATRY | Age: 33
End: 2023-02-07

## 2023-02-07 NOTE — TELEPHONE ENCOUNTER
Pt called to cancel/reschedule her appt for today 02/07/23 with Dr. Bryanna Pascual because her child is sick. Rescheduled for 03/02/23 @ 3.     Electronically signed by Joel Gonzalez MA on 2/7/2023 at 12:37 PM

## 2023-02-20 DIAGNOSIS — M54.12 CERVICAL RADICULOPATHY: ICD-10-CM

## 2023-02-20 DIAGNOSIS — M62.838 MUSCLE SPASMS OF NECK: ICD-10-CM

## 2023-02-21 RX ORDER — GABAPENTIN 300 MG/1
300 CAPSULE ORAL 3 TIMES DAILY
Qty: 90 CAPSULE | Refills: 0 | Status: SHIPPED | OUTPATIENT
Start: 2023-02-21 | End: 2023-03-23

## 2023-02-21 RX ORDER — METHOCARBAMOL 750 MG/1
750 TABLET, FILM COATED ORAL 3 TIMES DAILY
Qty: 90 TABLET | Refills: 0 | Status: SHIPPED | OUTPATIENT
Start: 2023-02-21

## 2023-02-21 NOTE — TELEPHONE ENCOUNTER
Artist Michelle called to request a refill on her medication. Last office visit : 12/27/2022   Next office visit : 2/22/2023     Last UDS:   Amphetamine Screen, Urine   Date Value Ref Range Status   12/27/2022 Postive  Final     Barbiturate Screen, Urine   Date Value Ref Range Status   12/27/2022 Negative  Final     Benzodiazepine Screen, Urine   Date Value Ref Range Status   12/27/2022 Negative  Final     Buprenorphine Urine   Date Value Ref Range Status   12/27/2022 Negative  Final     Cocaine Metabolite Screen, Urine   Date Value Ref Range Status   12/27/2022 Negative  Final     Gabapentin Screen, Urine   Date Value Ref Range Status   12/27/2022 Negative  Final     MDMA, Urine   Date Value Ref Range Status   12/27/2022 Negative  Final     Methamphetamine, Urine   Date Value Ref Range Status   12/27/2022 Negative  Final     Opiate Scrn, Ur   Date Value Ref Range Status   12/27/2022 Negative  Final     Oxycodone Screen, Ur   Date Value Ref Range Status   12/27/2022 Negative  Final     PCP Screen, Urine   Date Value Ref Range Status   12/27/2022 Negative  Final     Propoxyphene Screen, Urine   Date Value Ref Range Status   12/27/2022 Negative  Final     THC Screen, Urine   Date Value Ref Range Status   12/27/2022 Negative  Final     Tricyclic Antidepressants, Urine   Date Value Ref Range Status   12/27/2022 Negative  Final       Last Luis Border: 02/06/2023  Medication Contract: 12-  Last Fill: 12-    Requested Prescriptions     Pending Prescriptions Disp Refills    gabapentin (NEURONTIN) 300 MG capsule 90 capsule 0     Sig: Take 1 capsule by mouth 3 times daily for 30 days. methocarbamol (ROBAXIN) 750 MG tablet 90 tablet 0     Sig: Take 1 tablet by mouth 3 times daily         Please approve or refuse this medication.    Vonnie Christopher MA

## 2023-02-22 ENCOUNTER — OFFICE VISIT (OUTPATIENT)
Dept: PRIMARY CARE CLINIC | Age: 33
End: 2023-02-22
Payer: COMMERCIAL

## 2023-02-22 VITALS
HEART RATE: 88 BPM | DIASTOLIC BLOOD PRESSURE: 64 MMHG | TEMPERATURE: 97.5 F | HEIGHT: 71 IN | OXYGEN SATURATION: 94 % | SYSTOLIC BLOOD PRESSURE: 104 MMHG | BODY MASS INDEX: 27.72 KG/M2 | WEIGHT: 198 LBS

## 2023-02-22 DIAGNOSIS — F90.9 ADULT ADHD (ATTENTION DEFICIT HYPERACTIVITY DISORDER): Primary | ICD-10-CM

## 2023-02-22 PROCEDURE — 99213 OFFICE O/P EST LOW 20 MIN: CPT | Performed by: FAMILY MEDICINE

## 2023-02-22 ASSESSMENT — PATIENT HEALTH QUESTIONNAIRE - PHQ9
SUM OF ALL RESPONSES TO PHQ9 QUESTIONS 1 & 2: 4
SUM OF ALL RESPONSES TO PHQ QUESTIONS 1-9: 14
10. IF YOU CHECKED OFF ANY PROBLEMS, HOW DIFFICULT HAVE THESE PROBLEMS MADE IT FOR YOU TO DO YOUR WORK, TAKE CARE OF THINGS AT HOME, OR GET ALONG WITH OTHER PEOPLE: 1
9. THOUGHTS THAT YOU WOULD BE BETTER OFF DEAD, OR OF HURTING YOURSELF: 0
3. TROUBLE FALLING OR STAYING ASLEEP: 0
8. MOVING OR SPEAKING SO SLOWLY THAT OTHER PEOPLE COULD HAVE NOTICED. OR THE OPPOSITE, BEING SO FIGETY OR RESTLESS THAT YOU HAVE BEEN MOVING AROUND A LOT MORE THAN USUAL: 3
SUM OF ALL RESPONSES TO PHQ QUESTIONS 1-9: 14
5. POOR APPETITE OR OVEREATING: 0
7. TROUBLE CONCENTRATING ON THINGS, SUCH AS READING THE NEWSPAPER OR WATCHING TELEVISION: 3
SUM OF ALL RESPONSES TO PHQ QUESTIONS 1-9: 14
SUM OF ALL RESPONSES TO PHQ QUESTIONS 1-9: 14
6. FEELING BAD ABOUT YOURSELF - OR THAT YOU ARE A FAILURE OR HAVE LET YOURSELF OR YOUR FAMILY DOWN: 1
1. LITTLE INTEREST OR PLEASURE IN DOING THINGS: 2
2. FEELING DOWN, DEPRESSED OR HOPELESS: 2
4. FEELING TIRED OR HAVING LITTLE ENERGY: 3

## 2023-02-22 ASSESSMENT — ENCOUNTER SYMPTOMS: RESPIRATORY NEGATIVE: 1

## 2023-02-22 NOTE — PROGRESS NOTES
[address] [facility]  [address] [address]  [address] [address]  Dept: [phone]  Dept Fax: [phone]  Loc: [phone]      Subjective:     Chief Complaint   Patient presents with    Discuss Medications       HPI:  Lavella Nissen is a 28 y.o. female presents today for med check. She states that the current dose of Adderall is not working well for her and she would like to try Vyvanse when this prescription rans out  She is scheduled to see therapist for counseling and therapy on 3/3. Pt states that for now, she s handling her anxiety and depression well. She exercises regularly      ROS:   Review of Systems   Constitutional: Negative. HENT: Negative. Respiratory: Negative. Endocrine: Negative. Genitourinary: Negative. Musculoskeletal: Negative. Negative for myalgias and neck pain. Neurological: Negative. Psychiatric/Behavioral:  Positive for dysphoric mood. The patient is nervous/anxious. Anxiety and depression     PMHx:  Past Medical History:   Diagnosis Date    Anxiety and depression 1/20/2019    Anxiety and depression     GERD (gastroesophageal reflux disease)      Patient Active Problem List   Diagnosis    Anxiety and depression    Adult ADHD (attention deficit hyperactivity disorder)    Varicose veins of legs    Symptomatic reticular veins    Chronic gastroesophageal reflux disease    Displacement of cervical intervertebral disc       PSHx:  Past Surgical History:   Procedure Laterality Date    CHOLECYSTECTOMY      UPPER GASTROINTESTINAL ENDOSCOPY N/A 05/04/2021    Dr Alejandro Alvaradoant, w 47 Fr Dil, partially obstructing lower esoph ring, (-)H.  pylori, (+) GERD wo EOE, sm 1-2mm erosions sugg of chemical gastritis,    UPPER GASTROINTESTINAL ENDOSCOPY  05/04/2021    Dr Hema Marin weighted Bougie dilator-54 Fr    WISDOM TOOTH EXTRACTION         PFHx:  Family History   Problem Relation Age of Onset    Diabetes Maternal Grandmother     Cancer Paternal Grandfather        SocialHx:  Social History     Tobacco Use    Smoking status: Never    Smokeless tobacco: Never   Substance Use Topics    Alcohol use: Yes     Comment: occ       Allergies: Allergies   Allergen Reactions    Keflex [Cephalexin] Hives       Medications:  Current Outpatient Medications   Medication Sig Dispense Refill    gabapentin (NEURONTIN) 300 MG capsule Take 1 capsule by mouth 3 times daily for 30 days. 90 capsule 0    methocarbamol (ROBAXIN) 750 MG tablet Take 1 tablet by mouth 3 times daily 90 tablet 0    FLUoxetine (PROZAC) 20 MG capsule Take 1 capsule by mouth daily 90 capsule 0    amphetamine-dextroamphetamine (ADDERALL XR) 20 MG extended release capsule Take 1 capsule by mouth every morning for 30 days. 30 capsule 0    cloNIDine (CATAPRES) 0.1 MG tablet TAKE 1 TABLET BY MOUTH EVERY NIGHT AS NEEDED FOR INSOMNIA 90 tablet 1    fluticasone (FLONASE) 50 MCG/ACT nasal spray 2 sprays by Nasal route daily 16 g 3    omeprazole (PRILOSEC) 40 MG delayed release capsule TAKE ONE CAPSULE BY MOUTH ONCE DAILY. TAKE FIRST THING DAILY ON AN EMPTY STOMACH 30 capsule 11    diclofenac sodium (VOLTAREN) 1 % GEL Apply 2 g topically 4 times daily 2 Tube 1    CRANBERRY EXTRACT PO Take by mouth       No current facility-administered medications for this visit. Objective:   PE:  /64   Pulse 88   Temp 97.5 °F (36.4 °C) (Temporal)   Ht 5' 11\" (1.803 m)   Wt 198 lb (89.8 kg)   SpO2 94%   BMI 27.62 kg/m²   Physical Exam  Constitutional:       General: She is not in acute distress. HENT:      Head: Normocephalic. Nose: Nose normal.      Mouth/Throat:      Pharynx: Oropharynx is clear. No oropharyngeal exudate or posterior oropharyngeal erythema. Eyes:      General: No scleral icterus. Conjunctiva/sclera: Conjunctivae normal.   Cardiovascular:      Rate and Rhythm: Normal rate and regular rhythm. Pulses: Normal pulses.       Heart sounds: Normal heart sounds. No murmur heard. Pulmonary:      Effort: Pulmonary effort is normal.      Breath sounds: Normal breath sounds. Abdominal:      Palpations: Abdomen is soft. Tenderness: There is no abdominal tenderness. Musculoskeletal:         General: Normal range of motion. Cervical back: Neck supple. Skin:     General: Skin is warm and dry. Capillary Refill: Capillary refill takes less than 2 seconds. Neurological:      General: No focal deficit present. Mental Status: She is alert and oriented to person, place, and time. Psychiatric:         Behavior: Behavior normal.          Assessment & Plan   Ida Gomez was seen today for discuss medications. Diagnoses and all orders for this visit:    Adult ADHD (attention deficit hyperactivity disorder)      Return in about 3 months (around 5/22/2023) for adult annual physical with pap. All questions were answered. Medications, including possible adverse effects, and instructions were reviewed and  understanding was confirmed. Follow-up recommendations, including when to contact or return to office (ie; if symptoms worsen or fail to improve), were discussed and acknowledged.     Electronically signed by Stacey Rosales MD on 2/22/23 at 4:07 PM CST

## 2023-03-01 ENCOUNTER — TELEPHONE (OUTPATIENT)
Dept: PSYCHIATRY | Age: 33
End: 2023-03-01

## 2023-03-01 NOTE — TELEPHONE ENCOUNTER
Called pt for appointment reminder.     -Pt confirmed      Electronically signed by Oseas Bautista MA on 3/1/2023 at 2:56 PM

## 2023-03-02 ENCOUNTER — OFFICE VISIT (OUTPATIENT)
Dept: PSYCHIATRY | Age: 33
End: 2023-03-02

## 2023-03-02 VITALS
OXYGEN SATURATION: 97 % | TEMPERATURE: 97.4 F | HEART RATE: 73 BPM | BODY MASS INDEX: 28.66 KG/M2 | WEIGHT: 189.1 LBS | DIASTOLIC BLOOD PRESSURE: 71 MMHG | HEIGHT: 68 IN | SYSTOLIC BLOOD PRESSURE: 107 MMHG

## 2023-03-02 DIAGNOSIS — F43.10 PTSD (POST-TRAUMATIC STRESS DISORDER): ICD-10-CM

## 2023-03-02 DIAGNOSIS — F41.1 GENERALIZED ANXIETY DISORDER: ICD-10-CM

## 2023-03-02 DIAGNOSIS — F40.10 SOCIAL ANXIETY DISORDER: ICD-10-CM

## 2023-03-02 DIAGNOSIS — G47.00 INSOMNIA, UNSPECIFIED TYPE: ICD-10-CM

## 2023-03-02 DIAGNOSIS — F33.1 MAJOR DEPRESSIVE DISORDER, RECURRENT, MODERATE (HCC): Primary | ICD-10-CM

## 2023-03-02 RX ORDER — FLUOXETINE HYDROCHLORIDE 40 MG/1
40 CAPSULE ORAL DAILY
Qty: 30 CAPSULE | Refills: 1 | Status: SHIPPED | OUTPATIENT
Start: 2023-03-02 | End: 2023-04-01

## 2023-03-02 RX ORDER — HYDROXYZINE HYDROCHLORIDE 25 MG/1
25 TABLET, FILM COATED ORAL 3 TIMES DAILY PRN
Qty: 45 TABLET | Refills: 1 | Status: SHIPPED | OUTPATIENT
Start: 2023-03-02 | End: 2023-04-01
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
## 2023-03-06 ENCOUNTER — TELEPHONE (OUTPATIENT)
Dept: PSYCHIATRY | Age: 33
End: 2023-03-06

## 2023-03-06 DIAGNOSIS — F33.1 MAJOR DEPRESSIVE DISORDER, RECURRENT, MODERATE (HCC): Primary | ICD-10-CM

## 2023-03-06 DIAGNOSIS — F43.10 PTSD (POST-TRAUMATIC STRESS DISORDER): ICD-10-CM

## 2023-03-06 DIAGNOSIS — F40.10 SOCIAL ANXIETY DISORDER: ICD-10-CM

## 2023-03-06 DIAGNOSIS — F41.1 GENERALIZED ANXIETY DISORDER: ICD-10-CM

## 2023-03-06 NOTE — TELEPHONE ENCOUNTER
Sent Referral to [facility] for social anxiety and Trauma focused therapy.     Electronically signed by Marychuy Martin MA on 3/6/2023 at 10:22 AM

## 2023-03-07 ENCOUNTER — TELEPHONE (OUTPATIENT)
Dept: PRIMARY CARE CLINIC | Age: 33
End: 2023-03-07

## 2023-03-07 DIAGNOSIS — F90.9 ADULT ADHD (ATTENTION DEFICIT HYPERACTIVITY DISORDER): Primary | ICD-10-CM

## 2023-03-07 NOTE — TELEPHONE ENCOUNTER
Rufino Freeman called to request a refill on her medication. Last office visit : 2/22/2023   Next office visit : 5/22/2023     Last UDS:   Amphetamine Screen, Urine   Date Value Ref Range Status   12/27/2022 Postive  Final     Barbiturate Screen, Urine   Date Value Ref Range Status   12/27/2022 Negative  Final     Benzodiazepine Screen, Urine   Date Value Ref Range Status   12/27/2022 Negative  Final     Buprenorphine Urine   Date Value Ref Range Status   12/27/2022 Negative  Final     Cocaine Metabolite Screen, Urine   Date Value Ref Range Status   12/27/2022 Negative  Final     Gabapentin Screen, Urine   Date Value Ref Range Status   12/27/2022 Negative  Final     MDMA, Urine   Date Value Ref Range Status   12/27/2022 Negative  Final     Methamphetamine, Urine   Date Value Ref Range Status   12/27/2022 Negative  Final     Opiate Scrn, Ur   Date Value Ref Range Status   12/27/2022 Negative  Final     Oxycodone Screen, Ur   Date Value Ref Range Status   12/27/2022 Negative  Final     PCP Screen, Urine   Date Value Ref Range Status   12/27/2022 Negative  Final     Propoxyphene Screen, Urine   Date Value Ref Range Status   12/27/2022 Negative  Final     THC Screen, Urine   Date Value Ref Range Status   12/27/2022 Negative  Final     Tricyclic Antidepressants, Urine   Date Value Ref Range Status   12/27/2022 Negative  Final       Last Barron Jagdeep: 03/01/2023  Medication Contract: 12/27/2022  Last Fill: Patient states you were changing her medication vyvanse   Requested Prescriptions      No prescriptions requested or ordered in this encounter         Please approve or refuse this medication.    Shira Gauthier MA

## 2023-03-08 ENCOUNTER — TELEPHONE (OUTPATIENT)
Dept: PSYCHIATRY | Age: 33
End: 2023-03-08

## 2023-03-08 NOTE — TELEPHONE ENCOUNTER
Attempted to contact pt as requested by Dr Memo Dickson to see how she is doing since her appt last week-left VM with the above info and request to call back.

## 2023-03-14 DIAGNOSIS — F90.9 ADULT ADHD (ATTENTION DEFICIT HYPERACTIVITY DISORDER): Primary | ICD-10-CM

## 2023-03-14 RX ORDER — SERDEXMETHYLPHENIDATE AND DEXMETHYLPHENIDATE 5.2; 26.1 MG/1; MG/1
1 CAPSULE ORAL DAILY
Qty: 30 CAPSULE | Refills: 0 | Status: SHIPPED | OUTPATIENT
Start: 2023-03-14 | End: 2023-05-10

## 2023-03-22 ENCOUNTER — TELEPHONE (OUTPATIENT)
Dept: PSYCHIATRY | Age: 33
End: 2023-03-22

## 2023-03-22 NOTE — TELEPHONE ENCOUNTER
MA called [facility] to get an update on the referral our office sent out to them for therapy. Pt was scheduled for 03/21/23 @ 3:30 but pt was a no show to the appt. Notified the provider and it is up to the pt to call and schedule an appt.     Electronically signed by Samantha Krause MA on 3/22/2023 at 8:21 AM

## 2023-03-27 ENCOUNTER — TELEPHONE (OUTPATIENT)
Dept: PSYCHIATRY | Age: 33
End: 2023-03-27

## 2023-03-27 NOTE — TELEPHONE ENCOUNTER
Called pt for appointment reminder.   -left voicemail, requesting a return call      Electronically signed by Raysa Fajardo MA on 3/27/2023 at 1:47 PM

## 2023-03-28 ENCOUNTER — TELEPHONE (OUTPATIENT)
Dept: PSYCHIATRY | Age: 33
End: 2023-03-28

## 2023-03-28 NOTE — TELEPHONE ENCOUNTER
Pt called to cancel/reschedule her appt with Dr. Stone Link today 03/28/23 @ 3:30 because she has to take her kid to the doctor. Rescheduled for 04/25/23 @ 3:30.     Electronically signed by Andrews Reed MA on 3/28/2023 at 12:25 PM

## 2023-03-29 ENCOUNTER — TELEPHONE (OUTPATIENT)
Dept: PRIMARY CARE CLINIC | Age: 33
End: 2023-03-29

## 2023-04-14 ENCOUNTER — OFFICE VISIT (OUTPATIENT)
Dept: PRIMARY CARE CLINIC | Age: 33
End: 2023-04-14
Payer: COMMERCIAL

## 2023-04-14 VITALS
TEMPERATURE: 97.4 F | HEART RATE: 64 BPM | DIASTOLIC BLOOD PRESSURE: 80 MMHG | BODY MASS INDEX: 28.31 KG/M2 | OXYGEN SATURATION: 97 % | SYSTOLIC BLOOD PRESSURE: 110 MMHG | HEIGHT: 68 IN | WEIGHT: 186.8 LBS

## 2023-04-14 DIAGNOSIS — R60.0 BILATERAL LOWER EXTREMITY EDEMA: Primary | ICD-10-CM

## 2023-04-14 PROCEDURE — 99213 OFFICE O/P EST LOW 20 MIN: CPT | Performed by: NURSE PRACTITIONER

## 2023-04-14 SDOH — ECONOMIC STABILITY: FOOD INSECURITY: WITHIN THE PAST 12 MONTHS, YOU WORRIED THAT YOUR FOOD WOULD RUN OUT BEFORE YOU GOT MONEY TO BUY MORE.: NEVER TRUE

## 2023-04-14 SDOH — ECONOMIC STABILITY: HOUSING INSECURITY
IN THE LAST 12 MONTHS, WAS THERE A TIME WHEN YOU DID NOT HAVE A STEADY PLACE TO SLEEP OR SLEPT IN A SHELTER (INCLUDING NOW)?: NO

## 2023-04-14 SDOH — ECONOMIC STABILITY: INCOME INSECURITY: HOW HARD IS IT FOR YOU TO PAY FOR THE VERY BASICS LIKE FOOD, HOUSING, MEDICAL CARE, AND HEATING?: NOT VERY HARD

## 2023-04-14 SDOH — ECONOMIC STABILITY: FOOD INSECURITY: WITHIN THE PAST 12 MONTHS, THE FOOD YOU BOUGHT JUST DIDN'T LAST AND YOU DIDN'T HAVE MONEY TO GET MORE.: NEVER TRUE

## 2023-04-17 PROBLEM — R60.0 BILATERAL LOWER EXTREMITY EDEMA: Status: ACTIVE | Noted: 2023-04-17

## 2023-04-17 ASSESSMENT — ENCOUNTER SYMPTOMS
SORE THROAT: 0
COUGH: 0
CHEST TIGHTNESS: 0
VOMITING: 0
SHORTNESS OF BREATH: 0
ABDOMINAL PAIN: 0
COLOR CHANGE: 0
NAUSEA: 0
DIARRHEA: 0

## 2023-04-18 NOTE — PROGRESS NOTES
Palpations: Abdomen is soft. Musculoskeletal:         General: Normal range of motion. Right lower leg: Edema present. Left lower leg: Edema present. Skin:     General: Skin is warm and dry. Neurological:      Mental Status: She is alert and oriented to person, place, and time.    Psychiatric:         Mood and Affect: Mood normal.         Behavior: Behavior normal.       Electronically signed by MICAELA Vásquez CNP on 4/14/2023 at 9:16 PM.

## 2023-04-18 NOTE — ASSESSMENT & PLAN NOTE
Patient here today for concerns as she feels as if her bilateral lower extremities are swelling and retaining fluid. Patient states that she has noticed a line where her socks are when she has been on her feet for an extended period of time. Patient denies any change in weight or shortness of breath. Very mild edema noted on lower legs, without any notable pitting. Advised that she increase her water intake, avoid excess sodium, and wear compression stockings when she is one her feet for extended periods of time. There is notable varicose veins present, so also advised elevating her legs when possible.

## 2023-04-25 ENCOUNTER — TELEPHONE (OUTPATIENT)
Dept: PSYCHIATRY | Age: 33
End: 2023-04-25

## 2023-04-25 NOTE — TELEPHONE ENCOUNTER
Called pt was a no show. Called to check on them and    -Pt asked to reschedule and was rescheduled  05/03/23 @ 3:30.     Electronically signed by Bret Ibarra MA on 4/25/2023 at 4:22 PM

## 2023-04-25 NOTE — TELEPHONE ENCOUNTER
Called pt on 04/21/23 to remind them of their appt for 04/25/23  -left voicemail, requesting a return call      Electronically signed by Roney Maguire MA on 4/25/2023 at 11:49 AM

## 2023-05-02 ENCOUNTER — TELEPHONE (OUTPATIENT)
Dept: PSYCHIATRY | Age: 33
End: 2023-05-02

## 2023-05-02 NOTE — TELEPHONE ENCOUNTER
Called and confirmed appt with pt for 5/3 @ 3:30 PM    Electronically signed by Chrissie Carbajal on 5/2/2023 at 3:57 PM

## 2023-05-03 ENCOUNTER — OFFICE VISIT (OUTPATIENT)
Dept: PSYCHIATRY | Age: 33
End: 2023-05-03

## 2023-05-03 VITALS
RESPIRATION RATE: 18 BRPM | BODY MASS INDEX: 28.28 KG/M2 | OXYGEN SATURATION: 99 % | SYSTOLIC BLOOD PRESSURE: 114 MMHG | WEIGHT: 186 LBS | HEART RATE: 74 BPM | TEMPERATURE: 98.2 F | DIASTOLIC BLOOD PRESSURE: 77 MMHG

## 2023-05-03 DIAGNOSIS — F33.1 MAJOR DEPRESSIVE DISORDER, RECURRENT, MODERATE (HCC): Primary | ICD-10-CM

## 2023-05-03 DIAGNOSIS — F40.10 SOCIAL ANXIETY DISORDER: ICD-10-CM

## 2023-05-03 DIAGNOSIS — G47.00 INSOMNIA, UNSPECIFIED TYPE: ICD-10-CM

## 2023-05-03 DIAGNOSIS — F41.1 GENERALIZED ANXIETY DISORDER: ICD-10-CM

## 2023-05-03 DIAGNOSIS — F43.10 PTSD (POST-TRAUMATIC STRESS DISORDER): ICD-10-CM

## 2023-05-03 NOTE — PROGRESS NOTES
5/3/2023 3:52 PM   Progress Note          Pura Pool 1990      Chief Complaint   Patient presents with    Medication Check         Subjective:    80-year-old white female with history of depression, anxiety, ADHD, presents for follow up. Currently on Prozac and Adderall. Takes clonidine for sleep. States her primary care provider is switching her to Vyvanse. Reports compliance. No side effects. Increased Prozac last visit. No side effects. Compliant. Brighter affect. Smiling. Anxiety improved. Tolerating stress better. Sleep still poor and tired. Not in therapy yet. Medication is working well. No concerns. Will call for therapy appt.     BP: /77 (Site: Right Upper Arm, Position: Sitting, Cuff Size: Large Adult)   Pulse 74   Temp 98.2 °F (36.8 °C) (Temporal)   Resp 18   Wt 186 lb (84.4 kg)   SpO2 99%   BMI 28.28 kg/m²       Review of Systems - 14 point review:  Negative except for    Constitutional: (fevers, chills, night sweats, wt loss/gain, change in appetite, fatigue, somnolence)    HEENT: (ear pain or discharge, hearing loss, ear ringing, sinus pressure, nosebleed, nasal discharge, sore throat, oral sores, tooth pain, bleeding gums, hoarse voice, neck pain)      Cardiovascular: (HTN, chest pain, elevated cholesterol/lipids, palpitations, leg swelling, leg pain with walking)    Respiratory: (cough, wheezing, snoring, SOB with activity (dyspnea), SOB while lying flat (orthopnea), awakening with severe SOB (paroxysmal nocturnal dyspnea))    Gastrointestinal: (NVD, constipation, abdominal pain, bright red stools, black tarry stools, stool incontinence)     Genitourinary:  (pelvic pain, burning or frequency of urination, urinary urgency, blood in urine incomplete bladder emptying, urinary incontinence, STD; MEN: testicular pain or swelling, erectile dysfunction; WOMEN: LMP, heavy menstrual bleeding (menorrhagia), irregular periods, postmenopausal bleeding, menstrual pain (dymenorrhea,

## 2023-05-05 ENCOUNTER — TELEPHONE (OUTPATIENT)
Dept: PSYCHIATRY | Age: 33
End: 2023-05-05

## 2023-05-05 NOTE — TELEPHONE ENCOUNTER
Called pt on 05/02/23 to remind them of their appt for 05/03/23    -Pt confirmed    Electronically signed by Leann Doran MA on 5/5/2023 at 10:32 AM

## 2023-05-10 ENCOUNTER — OFFICE VISIT (OUTPATIENT)
Dept: PRIMARY CARE CLINIC | Age: 33
End: 2023-05-10
Payer: COMMERCIAL

## 2023-05-10 VITALS
DIASTOLIC BLOOD PRESSURE: 70 MMHG | TEMPERATURE: 98.2 F | HEART RATE: 82 BPM | HEIGHT: 71 IN | OXYGEN SATURATION: 99 % | WEIGHT: 186.6 LBS | BODY MASS INDEX: 26.12 KG/M2 | SYSTOLIC BLOOD PRESSURE: 114 MMHG

## 2023-05-10 DIAGNOSIS — F40.10 SOCIAL ANXIETY DISORDER: ICD-10-CM

## 2023-05-10 DIAGNOSIS — F90.9 ADULT ADHD (ATTENTION DEFICIT HYPERACTIVITY DISORDER): Primary | ICD-10-CM

## 2023-05-10 DIAGNOSIS — Z76.0 MEDICATION REFILL: ICD-10-CM

## 2023-05-10 PROCEDURE — 99213 OFFICE O/P EST LOW 20 MIN: CPT | Performed by: FAMILY MEDICINE

## 2023-05-10 RX ORDER — DEXTROAMPHETAMINE SACCHARATE, AMPHETAMINE ASPARTATE MONOHYDRATE, DEXTROAMPHETAMINE SULFATE AND AMPHETAMINE SULFATE 7.5; 7.5; 7.5; 7.5 MG/1; MG/1; MG/1; MG/1
30 CAPSULE, EXTENDED RELEASE ORAL DAILY
Qty: 30 CAPSULE | Refills: 0 | Status: SHIPPED | OUTPATIENT
Start: 2023-05-10 | End: 2023-06-09

## 2023-05-10 RX ORDER — FLUTICASONE PROPIONATE 50 MCG
2 SPRAY, SUSPENSION (ML) NASAL DAILY
Qty: 16 G | Refills: 3 | Status: SHIPPED | OUTPATIENT
Start: 2023-05-10

## 2023-05-10 RX ORDER — FLUOXETINE HYDROCHLORIDE 40 MG/1
40 CAPSULE ORAL DAILY
Qty: 90 CAPSULE | Refills: 1 | Status: SHIPPED | OUTPATIENT
Start: 2023-05-10 | End: 2023-07-09

## 2023-05-10 RX ORDER — CLONIDINE HYDROCHLORIDE 0.1 MG/1
TABLET ORAL
Qty: 90 TABLET | Refills: 1 | Status: SHIPPED | OUTPATIENT
Start: 2023-05-10

## 2023-05-10 ASSESSMENT — ENCOUNTER SYMPTOMS: RESPIRATORY NEGATIVE: 1

## 2023-05-10 NOTE — PROGRESS NOTES
[facility]  [id number] [place] [address] Candi Carmona [address]  Dept: [phone]  Dept Fax: [phone]  Loc: [phone]      Subjective:     Chief Complaint   Patient presents with    Discuss Medications       HPI:  Yi Karimi is a 35 y.o. female presents today for med check and refills. She  state that current dose of her meds are working. No new issues. ROS:   Review of Systems   Constitutional: Negative. HENT: Negative. Respiratory: Negative. Endocrine: Negative. Genitourinary: Negative. Musculoskeletal: Negative. Neurological: Negative. Psychiatric/Behavioral:  Negative for dysphoric mood. The patient is not nervous/anxious. Anxiety and depression - improved on current  dose of Prozac     PMHx:  Past Medical History:   Diagnosis Date    Anxiety and depression 1/20/2019    Anxiety and depression     GERD (gastroesophageal reflux disease)      Patient Active Problem List   Diagnosis    Anxiety and depression    Adult ADHD (attention deficit hyperactivity disorder)    Varicose veins of legs    Symptomatic reticular veins    Chronic gastroesophageal reflux disease    Displacement of cervical intervertebral disc    Bilateral lower extremity edema       PSHx:  Past Surgical History:   Procedure Laterality Date    CHOLECYSTECTOMY      UPPER GASTROINTESTINAL ENDOSCOPY N/A 05/04/2021    Dr Tol[place] Ou, w 47 Fr Dil, partially obstructing lower esoph ring, (-)H. pylori, (+) GERD wo EOE, sm 1-2mm erosions sugg of chemical gastritis,    UPPER GASTROINTESTINAL ENDOSCOPY  05/04/2021    Dr Mira Leiva weighted Bougie dilator-54 Fr    WISDOM TOOTH EXTRACTION         PFHx:  Family History   Problem Relation Age of Onset    Diabetes Maternal Grandmother     Cancer Paternal Grandfather        SocialHx:  Social History     Tobacco Use    Smoking status: Never    Smokeless tobacco: Never   Substance Use Topics    Alcohol use:  Yes

## 2023-05-30 DIAGNOSIS — M62.838 MUSCLE SPASMS OF NECK: ICD-10-CM

## 2023-05-30 DIAGNOSIS — M54.12 CERVICAL RADICULOPATHY: ICD-10-CM

## 2023-05-31 RX ORDER — METHOCARBAMOL 750 MG/1
750 TABLET, FILM COATED ORAL 3 TIMES DAILY
Qty: 90 TABLET | Refills: 0 | Status: SHIPPED | OUTPATIENT
Start: 2023-05-31

## 2023-05-31 RX ORDER — GABAPENTIN 300 MG/1
300 CAPSULE ORAL 3 TIMES DAILY
Qty: 90 CAPSULE | Refills: 0 | Status: SHIPPED | OUTPATIENT
Start: 2023-05-31 | End: 2023-06-30

## 2023-05-31 NOTE — TELEPHONE ENCOUNTER
Carmelita Case called to request a refill on her medication. Last office visit : 5/10/2023   Next office visit : 8/9/2023     Last UDS:   Amphetamine Screen, Urine   Date Value Ref Range Status   12/27/2022 Postive  Final     Barbiturate Screen, Urine   Date Value Ref Range Status   12/27/2022 Negative  Final     Benzodiazepine Screen, Urine   Date Value Ref Range Status   12/27/2022 Negative  Final     Buprenorphine Urine   Date Value Ref Range Status   12/27/2022 Negative  Final     Cocaine Metabolite Screen, Urine   Date Value Ref Range Status   12/27/2022 Negative  Final     Gabapentin Screen, Urine   Date Value Ref Range Status   12/27/2022 Negative  Final     MDMA, Urine   Date Value Ref Range Status   12/27/2022 Negative  Final     Methamphetamine, Urine   Date Value Ref Range Status   12/27/2022 Negative  Final     Opiate Scrn, Ur   Date Value Ref Range Status   12/27/2022 Negative  Final     Oxycodone Screen, Ur   Date Value Ref Range Status   12/27/2022 Negative  Final     PCP Screen, Urine   Date Value Ref Range Status   12/27/2022 Negative  Final     Propoxyphene Screen, Urine   Date Value Ref Range Status   12/27/2022 Negative  Final     THC Screen, Urine   Date Value Ref Range Status   12/27/2022 Negative  Final     Tricyclic Antidepressants, Urine   Date Value Ref Range Status   12/27/2022 Negative  Final       Last Denys Edilson: 5/2/23  Medication Contract: 12/27/22   Last Fill: 4/11/23    Requested Prescriptions     Pending Prescriptions Disp Refills    gabapentin (NEURONTIN) 300 MG capsule 90 capsule 0     Sig: Take 1 capsule by mouth 3 times daily for 30 days. methocarbamol (ROBAXIN) 750 MG tablet 90 tablet 0     Sig: Take 1 tablet by mouth 3 times daily         Please approve or refuse this medication.    Dereck Limon LPN

## 2023-06-07 RX ORDER — METRONIDAZOLE 500 MG/1
500 TABLET ORAL 3 TIMES DAILY
Qty: 15 TABLET | Refills: 0 | Status: SHIPPED | OUTPATIENT
Start: 2023-06-07 | End: 2023-06-12

## 2023-06-20 ENCOUNTER — TELEPHONE (OUTPATIENT)
Dept: PSYCHIATRY | Age: 33
End: 2023-06-20

## 2023-06-20 NOTE — TELEPHONE ENCOUNTER
Called and lvm reminding pt of her appt for 6/21 @ 3:30 PM    Electronically signed by Marie Matta on 6/20/2023 at 3:08 PM

## 2023-06-21 ENCOUNTER — TELEPHONE (OUTPATIENT)
Dept: PSYCHIATRY | Age: 33
End: 2023-06-21

## 2023-06-21 NOTE — TELEPHONE ENCOUNTER
Pt lvm at 12:30 stating that she needed to reschedule appt for 6/21 @ 3:30 PM    Called pt and lvm asking pt to return phone call.     Electronically signed by Enoch Cole on 6/21/2023 at 4:26 PM

## 2023-06-28 DIAGNOSIS — M54.12 CERVICAL RADICULOPATHY: ICD-10-CM

## 2023-06-28 RX ORDER — GABAPENTIN 400 MG/1
CAPSULE ORAL
Qty: 60 CAPSULE | Refills: 0 | Status: SHIPPED | OUTPATIENT
Start: 2023-06-28 | End: 2023-07-28

## 2023-07-06 DIAGNOSIS — Z76.0 MEDICATION REFILL: ICD-10-CM

## 2023-07-06 RX ORDER — FLUTICASONE PROPIONATE 50 MCG
2 SPRAY, SUSPENSION (ML) NASAL DAILY
Qty: 16 G | Refills: 3 | OUTPATIENT
Start: 2023-07-06

## 2023-07-06 NOTE — TELEPHONE ENCOUNTER
Aryan Andrade called to request a refill on her medication.       Last office visit : 5/10/2023   Next office visit : 2023     Requested Prescriptions     Pending Prescriptions Disp Refills    fluticasone (FLONASE) 50 MCG/ACT nasal spray 16 g 3     Si sprays by Nasal route daily            Silvia Armendariz LPN

## 2023-07-17 ENCOUNTER — PATIENT MESSAGE (OUTPATIENT)
Dept: PRIMARY CARE CLINIC | Age: 33
End: 2023-07-17

## 2023-07-17 NOTE — TELEPHONE ENCOUNTER
From: Aryan Andrade  To: Dr. Braden Larger: 7/17/2023 9:40 AM CDT  Subject: Med change    I have one Adderall left, which I'm due for a refill. Im trying this new medication I found online called Elevate\" supposed too be way better for you then Adderall. But I'm gonna try it for 30 days starting today and I'll let y'all know if I wanna switch back or now.

## 2023-07-19 DIAGNOSIS — Z76.0 MEDICATION REFILL: ICD-10-CM

## 2023-07-19 DIAGNOSIS — F90.9 ADULT ADHD (ATTENTION DEFICIT HYPERACTIVITY DISORDER): ICD-10-CM

## 2023-07-19 NOTE — TELEPHONE ENCOUNTER
Buffyxiang Felisa called to request a refill on her medication. Last office visit : 5/10/2023   Next office visit : 8/9/2023     Last UDS:   Amphetamine Screen, Urine   Date Value Ref Range Status   12/27/2022 Postive  Final     Barbiturate Screen, Urine   Date Value Ref Range Status   12/27/2022 Negative  Final     Benzodiazepine Screen, Urine   Date Value Ref Range Status   12/27/2022 Negative  Final     Buprenorphine Urine   Date Value Ref Range Status   12/27/2022 Negative  Final     Cocaine Metabolite Screen, Urine   Date Value Ref Range Status   12/27/2022 Negative  Final     Gabapentin Screen, Urine   Date Value Ref Range Status   12/27/2022 Negative  Final     MDMA, Urine   Date Value Ref Range Status   12/27/2022 Negative  Final     Methamphetamine, Urine   Date Value Ref Range Status   12/27/2022 Negative  Final     Opiate Scrn, Ur   Date Value Ref Range Status   12/27/2022 Negative  Final     Oxycodone Screen, Ur   Date Value Ref Range Status   12/27/2022 Negative  Final     PCP Screen, Urine   Date Value Ref Range Status   12/27/2022 Negative  Final     Propoxyphene Screen, Urine   Date Value Ref Range Status   12/27/2022 Negative  Final     THC Screen, Urine   Date Value Ref Range Status   12/27/2022 Negative  Final     Tricyclic Antidepressants, Urine   Date Value Ref Range Status   12/27/2022 Negative  Final       Last Tra Charlotte: 6-20-23  Medication Contract: 3-2-23   Last Fill: 6-28-23    Requested Prescriptions     Pending Prescriptions Disp Refills    amphetamine-dextroamphetamine (ADDERALL XR) 30 MG extended release capsule 30 capsule 0     Sig: Take 1 capsule by mouth daily for 30 days. Max Daily Amount: 30 mg                                   Please approve or refuse this medication.    Katheryn Queen LPN

## 2023-07-20 DIAGNOSIS — F90.9 ADULT ADHD (ATTENTION DEFICIT HYPERACTIVITY DISORDER): ICD-10-CM

## 2023-07-20 DIAGNOSIS — Z76.0 MEDICATION REFILL: ICD-10-CM

## 2023-07-20 NOTE — TELEPHONE ENCOUNTER
Gila Hatch called to request a refill on her medication. Last office visit : 5/10/2023   Next office visit : 8/9/2023     Last UDS:   Amphetamine Screen, Urine   Date Value Ref Range Status   12/27/2022 Postive  Final     Barbiturate Screen, Urine   Date Value Ref Range Status   12/27/2022 Negative  Final     Benzodiazepine Screen, Urine   Date Value Ref Range Status   12/27/2022 Negative  Final     Buprenorphine Urine   Date Value Ref Range Status   12/27/2022 Negative  Final     Cocaine Metabolite Screen, Urine   Date Value Ref Range Status   12/27/2022 Negative  Final     Gabapentin Screen, Urine   Date Value Ref Range Status   12/27/2022 Negative  Final     MDMA, Urine   Date Value Ref Range Status   12/27/2022 Negative  Final     Methamphetamine, Urine   Date Value Ref Range Status   12/27/2022 Negative  Final     Opiate Scrn, Ur   Date Value Ref Range Status   12/27/2022 Negative  Final     Oxycodone Screen, Ur   Date Value Ref Range Status   12/27/2022 Negative  Final     PCP Screen, Urine   Date Value Ref Range Status   12/27/2022 Negative  Final     Propoxyphene Screen, Urine   Date Value Ref Range Status   12/27/2022 Negative  Final     THC Screen, Urine   Date Value Ref Range Status   12/27/2022 Negative  Final     Tricyclic Antidepressants, Urine   Date Value Ref Range Status   12/27/2022 Negative  Final       Last Marsha Aylin: 6/20/23  Medication Contract: 3/2/23   Last Fill: 6/14/23    Requested Prescriptions     Pending Prescriptions Disp Refills    amphetamine-dextroamphetamine (ADDERALL XR) 30 MG extended release capsule 30 capsule 0     Sig: Take 1 capsule by mouth daily for 30 days. Max Daily Amount: 30 mg                                   Please approve or refuse this medication.    John Taveras LPN

## 2023-07-20 NOTE — TELEPHONE ENCOUNTER
----- Message from Maryam Rosiantonio sent at 7/19/2023  7:14 PM CDT -----  Regarding: Med Adderall   Contact: [phone]  Yes please

## 2023-07-24 DIAGNOSIS — M62.838 MUSCLE SPASMS OF NECK: ICD-10-CM

## 2023-07-24 DIAGNOSIS — M54.12 CERVICAL RADICULOPATHY: ICD-10-CM

## 2023-07-24 RX ORDER — DEXTROAMPHETAMINE SACCHARATE, AMPHETAMINE ASPARTATE MONOHYDRATE, DEXTROAMPHETAMINE SULFATE AND AMPHETAMINE SULFATE 7.5; 7.5; 7.5; 7.5 MG/1; MG/1; MG/1; MG/1
30 CAPSULE, EXTENDED RELEASE ORAL DAILY
Qty: 30 CAPSULE | Refills: 0 | OUTPATIENT
Start: 2023-07-24 | End: 2023-08-23

## 2023-07-24 RX ORDER — DEXTROAMPHETAMINE SACCHARATE, AMPHETAMINE ASPARTATE MONOHYDRATE, DEXTROAMPHETAMINE SULFATE AND AMPHETAMINE SULFATE 7.5; 7.5; 7.5; 7.5 MG/1; MG/1; MG/1; MG/1
30 CAPSULE, EXTENDED RELEASE ORAL DAILY
Qty: 30 CAPSULE | Refills: 0 | Status: SHIPPED | OUTPATIENT
Start: 2023-07-24 | End: 2023-08-23

## 2023-07-24 NOTE — TELEPHONE ENCOUNTER
Janet Diaz called to request a refill on her medication. Last office visit : 5/10/2023   Next office visit : 8/9/2023     Last UDS:   Amphetamine Screen, Urine   Date Value Ref Range Status   12/27/2022 Postive  Final     Barbiturate Screen, Urine   Date Value Ref Range Status   12/27/2022 Negative  Final     Benzodiazepine Screen, Urine   Date Value Ref Range Status   12/27/2022 Negative  Final     Buprenorphine Urine   Date Value Ref Range Status   12/27/2022 Negative  Final     Cocaine Metabolite Screen, Urine   Date Value Ref Range Status   12/27/2022 Negative  Final     Gabapentin Screen, Urine   Date Value Ref Range Status   12/27/2022 Negative  Final     MDMA, Urine   Date Value Ref Range Status   12/27/2022 Negative  Final     Methamphetamine, Urine   Date Value Ref Range Status   12/27/2022 Negative  Final     Opiate Scrn, Ur   Date Value Ref Range Status   12/27/2022 Negative  Final     Oxycodone Screen, Ur   Date Value Ref Range Status   12/27/2022 Negative  Final     PCP Screen, Urine   Date Value Ref Range Status   12/27/2022 Negative  Final     Propoxyphene Screen, Urine   Date Value Ref Range Status   12/27/2022 Negative  Final     THC Screen, Urine   Date Value Ref Range Status   12/27/2022 Negative  Final     Tricyclic Antidepressants, Urine   Date Value Ref Range Status   12/27/2022 Negative  Final       Last Shasta Parma: 6/20/23  Medication Contract: 3/2/23   Last Fill: 6/28/23    Requested Prescriptions     Pending Prescriptions Disp Refills    methocarbamol (ROBAXIN) 750 MG tablet 90 tablet 0     Sig: Take 1 tablet by mouth 3 times daily    gabapentin (NEURONTIN) 400 MG capsule 60 capsule 0     Sig: Take 1 tablets twice a day                                   Please approve or refuse this medication.    Wing Patel LPN

## 2023-07-26 RX ORDER — METHOCARBAMOL 750 MG/1
750 TABLET, FILM COATED ORAL 3 TIMES DAILY
Qty: 90 TABLET | Refills: 0 | Status: SHIPPED | OUTPATIENT
Start: 2023-07-26

## 2023-07-26 RX ORDER — GABAPENTIN 400 MG/1
CAPSULE ORAL
Qty: 60 CAPSULE | Refills: 0 | Status: SHIPPED | OUTPATIENT
Start: 2023-07-26 | End: 2023-08-23

## 2023-08-09 ENCOUNTER — OFFICE VISIT (OUTPATIENT)
Dept: PRIMARY CARE CLINIC | Age: 33
End: 2023-08-09
Payer: COMMERCIAL

## 2023-08-09 VITALS
HEIGHT: 68 IN | BODY MASS INDEX: 28.79 KG/M2 | WEIGHT: 190 LBS | DIASTOLIC BLOOD PRESSURE: 64 MMHG | OXYGEN SATURATION: 100 % | TEMPERATURE: 97.6 F | HEART RATE: 88 BPM | SYSTOLIC BLOOD PRESSURE: 110 MMHG

## 2023-08-09 DIAGNOSIS — Z12.4 SCREENING FOR CERVICAL CANCER: ICD-10-CM

## 2023-08-09 DIAGNOSIS — Z23 IMMUNIZATION DUE: ICD-10-CM

## 2023-08-09 DIAGNOSIS — Z76.0 MEDICATION REFILL: ICD-10-CM

## 2023-08-09 DIAGNOSIS — Z00.00 ANNUAL PHYSICAL EXAM: Primary | ICD-10-CM

## 2023-08-09 PROCEDURE — 90471 IMMUNIZATION ADMIN: CPT | Performed by: FAMILY MEDICINE

## 2023-08-09 PROCEDURE — 99395 PREV VISIT EST AGE 18-39: CPT | Performed by: FAMILY MEDICINE

## 2023-08-09 PROCEDURE — 90715 TDAP VACCINE 7 YRS/> IM: CPT | Performed by: FAMILY MEDICINE

## 2023-08-09 RX ORDER — FLUTICASONE PROPIONATE 50 MCG
2 SPRAY, SUSPENSION (ML) NASAL DAILY
Qty: 16 G | Refills: 3 | Status: SHIPPED | OUTPATIENT
Start: 2023-08-09

## 2023-08-09 ASSESSMENT — ENCOUNTER SYMPTOMS: RESPIRATORY NEGATIVE: 1

## 2023-08-09 NOTE — PROGRESS NOTES
After obtaining consent, and per orders of Dr. Padmini Collins, injection of Bosstrix given in Left deltoid by Ousmane Solares. Patient signed consent scanned into patients chart.

## 2023-08-13 DIAGNOSIS — F40.10 SOCIAL ANXIETY DISORDER: ICD-10-CM

## 2023-08-13 DIAGNOSIS — Z76.0 MEDICATION REFILL: ICD-10-CM

## 2023-08-14 RX ORDER — FLUOXETINE HYDROCHLORIDE 40 MG/1
40 CAPSULE ORAL DAILY
Qty: 90 CAPSULE | Refills: 1 | OUTPATIENT
Start: 2023-08-14 | End: 2023-10-13

## 2023-08-14 NOTE — TELEPHONE ENCOUNTER
Daniel Peña called to request a refill on her medication.       Last office visit : 8/9/2023   Next office visit : Visit date not found     Requested Prescriptions     Pending Prescriptions Disp Refills    FLUoxetine (PROZAC) 40 MG capsule 90 capsule 1     Sig: Take 1 capsule by mouth daily            Mikell Nissen, LPN

## 2023-08-21 DIAGNOSIS — Z76.0 MEDICATION REFILL: ICD-10-CM

## 2023-08-21 DIAGNOSIS — F90.9 ADULT ADHD (ATTENTION DEFICIT HYPERACTIVITY DISORDER): ICD-10-CM

## 2023-08-21 RX ORDER — DEXTROAMPHETAMINE SACCHARATE, AMPHETAMINE ASPARTATE MONOHYDRATE, DEXTROAMPHETAMINE SULFATE AND AMPHETAMINE SULFATE 7.5; 7.5; 7.5; 7.5 MG/1; MG/1; MG/1; MG/1
30 CAPSULE, EXTENDED RELEASE ORAL DAILY
Qty: 30 CAPSULE | Refills: 0 | Status: SHIPPED | OUTPATIENT
Start: 2023-08-23 | End: 2023-09-22

## 2023-08-21 NOTE — TELEPHONE ENCOUNTER
Junito Butler called to request a refill on her medication. Last office visit : 8/9/2023   Next office visit : 8/14/23  Last UDS:   Amphetamine Screen, Urine   Date Value Ref Range Status   12/27/2022 Postive  Final     Barbiturate Screen, Urine   Date Value Ref Range Status   12/27/2022 Negative  Final     Benzodiazepine Screen, Urine   Date Value Ref Range Status   12/27/2022 Negative  Final     Buprenorphine Urine   Date Value Ref Range Status   12/27/2022 Negative  Final     Cocaine Metabolite Screen, Urine   Date Value Ref Range Status   12/27/2022 Negative  Final     Gabapentin Screen, Urine   Date Value Ref Range Status   12/27/2022 Negative  Final     MDMA, Urine   Date Value Ref Range Status   12/27/2022 Negative  Final     Methamphetamine, Urine   Date Value Ref Range Status   12/27/2022 Negative  Final     Opiate Scrn, Ur   Date Value Ref Range Status   12/27/2022 Negative  Final     Oxycodone Screen, Ur   Date Value Ref Range Status   12/27/2022 Negative  Final     PCP Screen, Urine   Date Value Ref Range Status   12/27/2022 Negative  Final     Propoxyphene Screen, Urine   Date Value Ref Range Status   12/27/2022 Negative  Final     THC Screen, Urine   Date Value Ref Range Status   12/27/2022 Negative  Final     Tricyclic Antidepressants, Urine   Date Value Ref Range Status   12/27/2022 Negative  Final       Last Zach Fragmin: 8/21/23  Medication Contract: 3/2/23   Last Fill: 7/24/23    Requested Prescriptions     Pending Prescriptions Disp Refills    amphetamine-dextroamphetamine (ADDERALL XR) 30 MG extended release capsule 30 capsule 0     Sig: Take 1 capsule by mouth daily for 30 days. Max Daily Amount: 30 mg                                   Please approve or refuse this medication.    Franco Orellana LPN

## 2023-08-24 ENCOUNTER — TELEPHONE (OUTPATIENT)
Dept: PRIMARY CARE CLINIC | Age: 33
End: 2023-08-24

## 2023-09-11 NOTE — PROGRESS NOTES
[facility]  [facility],[address]  [place] [address]  Dept: [phone]  Dept Fax: [phone]  Loc: [phone]      Subjective:     Chief Complaint   Patient presents with    Annual Exam       HPI:  Purvi Brito is a 35 y.o. female presents today for her annual PE with pap. PMHx reviewed. No significant change. Family and social history also reviewed. No recent ER or UC visit. No recent hospitalization. Chronic medications reviewed, updated and reconciled  Pt does not smoke, or use recreational drugs. She does drink ETOH occasionally  She  is not Covid vaccinated. She is due for Tdap  She is due for screening preventative labs   Overall, she feels well and healthy. ROS:   Review of Systems   Constitutional: Negative. HENT: Negative. Respiratory: Negative. Endocrine: Negative. Genitourinary: Negative. Musculoskeletal: Negative. Neurological: Negative. Psychiatric/Behavioral:  Negative for dysphoric mood. The patient is not nervous/anxious. Anxiety and depression - improved on current  dose of Prozac     PMHx:  Past Medical History:   Diagnosis Date    Anxiety and depression 1/20/2019    Anxiety and depression     GERD (gastroesophageal reflux disease)      Patient Active Problem List   Diagnosis    Anxiety and depression    Adult ADHD (attention deficit hyperactivity disorder)    Varicose veins of legs    Symptomatic reticular veins    Chronic gastroesophageal reflux disease    Displacement of cervical intervertebral disc    Bilateral lower extremity edema       PSHx:  Past Surgical History:   Procedure Laterality Date    CHOLECYSTECTOMY      UPPER GASTROINTESTINAL ENDOSCOPY N/A 05/04/2021    Dr August Francisco, w 47 Fr Dil, partially obstructing lower esoph ring, (-)H.  pylori, (+) GERD wo EOE, sm 1-2mm erosions sugg of chemical gastritis,    UPPER GASTROINTESTINAL ENDOSCOPY  05/04/2021    Dr Aguila Bauman Detail Level: Detailed Size Of Lesion In Cm (Optional): 0 Introduction Text (Please End With A Colon): The following procedure was deferred: Procedure To Be Performed At Next Visit: Excision

## 2023-09-15 DIAGNOSIS — M54.12 CERVICAL RADICULOPATHY: ICD-10-CM

## 2023-09-15 DIAGNOSIS — M62.838 MUSCLE SPASMS OF NECK: ICD-10-CM

## 2023-09-15 NOTE — TELEPHONE ENCOUNTER
Kendra Umer called to request a refill on her medication. Last office visit : 8/9/2023   Next office visit : 8/14/24    Last UDS:   Amphetamine Screen, Urine   Date Value Ref Range Status   12/27/2022 Postive  Final     Barbiturate Screen, Urine   Date Value Ref Range Status   12/27/2022 Negative  Final     Benzodiazepine Screen, Urine   Date Value Ref Range Status   12/27/2022 Negative  Final     Buprenorphine Urine   Date Value Ref Range Status   12/27/2022 Negative  Final     Cocaine Metabolite Screen, Urine   Date Value Ref Range Status   12/27/2022 Negative  Final     Gabapentin Screen, Urine   Date Value Ref Range Status   12/27/2022 Negative  Final     MDMA, Urine   Date Value Ref Range Status   12/27/2022 Negative  Final     Methamphetamine, Urine   Date Value Ref Range Status   12/27/2022 Negative  Final     Opiate Scrn, Ur   Date Value Ref Range Status   12/27/2022 Negative  Final     Oxycodone Screen, Ur   Date Value Ref Range Status   12/27/2022 Negative  Final     PCP Screen, Urine   Date Value Ref Range Status   12/27/2022 Negative  Final     Propoxyphene Screen, Urine   Date Value Ref Range Status   12/27/2022 Negative  Final     THC Screen, Urine   Date Value Ref Range Status   12/27/2022 Negative  Final     Tricyclic Antidepressants, Urine   Date Value Ref Range Status   12/27/2022 Negative  Final       Last Norris Wood: 8/21/23  Medication Contract: 3/2/23   Last Fill: 7/26/23    Requested Prescriptions     Pending Prescriptions Disp Refills    methocarbamol (ROBAXIN) 750 MG tablet 90 tablet 0     Sig: Take 1 tablet by mouth 3 times daily    gabapentin (NEURONTIN) 400 MG capsule 60 capsule 0     Sig: Take 1 tablets twice a day                                   Please approve or refuse this medication.    Erinn Nowka LPN

## 2023-09-17 RX ORDER — GABAPENTIN 400 MG/1
CAPSULE ORAL
Qty: 60 CAPSULE | Refills: 0 | Status: SHIPPED | OUTPATIENT
Start: 2023-09-17 | End: 2023-10-13

## 2023-09-17 RX ORDER — METHOCARBAMOL 750 MG/1
750 TABLET, FILM COATED ORAL 3 TIMES DAILY
Qty: 90 TABLET | Refills: 0 | Status: SHIPPED | OUTPATIENT
Start: 2023-09-17

## 2023-10-10 DIAGNOSIS — Z76.0 MEDICATION REFILL: ICD-10-CM

## 2023-10-10 DIAGNOSIS — F90.9 ADULT ADHD (ATTENTION DEFICIT HYPERACTIVITY DISORDER): ICD-10-CM

## 2023-10-10 RX ORDER — FLUTICASONE PROPIONATE 50 MCG
2 SPRAY, SUSPENSION (ML) NASAL DAILY
Qty: 16 G | Refills: 3 | OUTPATIENT
Start: 2023-10-10

## 2023-10-10 RX ORDER — DEXTROAMPHETAMINE SACCHARATE, AMPHETAMINE ASPARTATE MONOHYDRATE, DEXTROAMPHETAMINE SULFATE AND AMPHETAMINE SULFATE 7.5; 7.5; 7.5; 7.5 MG/1; MG/1; MG/1; MG/1
30 CAPSULE, EXTENDED RELEASE ORAL DAILY
Qty: 30 CAPSULE | Refills: 0 | Status: SHIPPED | OUTPATIENT
Start: 2023-10-10 | End: 2023-11-09

## 2023-10-10 NOTE — TELEPHONE ENCOUNTER
Orin Angeles called to request a refill on her medication. Last office visit : 2023   Next office visit : Visit date not found     Last UDS:   Amphetamine Screen, Urine   Date Value Ref Range Status   2022 Postive  Final     Barbiturate Screen, Urine   Date Value Ref Range Status   2022 Negative  Final     Benzodiazepine Screen, Urine   Date Value Ref Range Status   2022 Negative  Final     Buprenorphine Urine   Date Value Ref Range Status   2022 Negative  Final     Cocaine Metabolite Screen, Urine   Date Value Ref Range Status   2022 Negative  Final     Gabapentin Screen, Urine   Date Value Ref Range Status   2022 Negative  Final     MDMA, Urine   Date Value Ref Range Status   2022 Negative  Final     Methamphetamine, Urine   Date Value Ref Range Status   2022 Negative  Final     Opiate Scrn, Ur   Date Value Ref Range Status   2022 Negative  Final     Oxycodone Screen, Ur   Date Value Ref Range Status   2022 Negative  Final     PCP Screen, Urine   Date Value Ref Range Status   2022 Negative  Final     Propoxyphene Screen, Urine   Date Value Ref Range Status   2022 Negative  Final     THC Screen, Urine   Date Value Ref Range Status   2022 Negative  Final     Tricyclic Antidepressants, Urine   Date Value Ref Range Status   2022 Negative  Final       Last Irl Sole: 23  Medication Contract: 3/2/23   Last Fill:     Requested Prescriptions     Pending Prescriptions Disp Refills    fluticasone (FLONASE) 50 MCG/ACT nasal spray 16 g 3     Si sprays by Nasal route daily    amphetamine-dextroamphetamine (ADDERALL XR) 30 MG extended release capsule 30 capsule 0     Sig: Take 1 capsule by mouth daily for 30 days. Max Daily Amount: 30 mg         Please approve or refuse this medication.    Safia Coleman LPN

## 2023-11-13 DIAGNOSIS — M54.12 CERVICAL RADICULOPATHY: ICD-10-CM

## 2023-11-13 RX ORDER — GABAPENTIN 400 MG/1
CAPSULE ORAL
Qty: 60 CAPSULE | Refills: 0 | Status: SHIPPED | OUTPATIENT
Start: 2023-11-13 | End: 2023-12-09

## 2023-11-13 NOTE — TELEPHONE ENCOUNTER
Wojciech Bello called to request a refill on her medication. Last office visit : 8/9/2023   Next office visit : Visit date not found     Last UDS:   Amphetamine Screen, Urine   Date Value Ref Range Status   12/27/2022 Postive  Final     Barbiturate Screen, Urine   Date Value Ref Range Status   12/27/2022 Negative  Final     Benzodiazepine Screen, Urine   Date Value Ref Range Status   12/27/2022 Negative  Final     Buprenorphine Urine   Date Value Ref Range Status   12/27/2022 Negative  Final     Cocaine Metabolite Screen, Urine   Date Value Ref Range Status   12/27/2022 Negative  Final     Gabapentin Screen, Urine   Date Value Ref Range Status   12/27/2022 Negative  Final     MDMA, Urine   Date Value Ref Range Status   12/27/2022 Negative  Final     Methamphetamine, Urine   Date Value Ref Range Status   12/27/2022 Negative  Final     Opiate Scrn, Ur   Date Value Ref Range Status   12/27/2022 Negative  Final     Oxycodone Screen, Ur   Date Value Ref Range Status   12/27/2022 Negative  Final     PCP Screen, Urine   Date Value Ref Range Status   12/27/2022 Negative  Final     Propoxyphene Screen, Urine   Date Value Ref Range Status   12/27/2022 Negative  Final     THC Screen, Urine   Date Value Ref Range Status   12/27/2022 Negative  Final     Tricyclic Antidepressants, Urine   Date Value Ref Range Status   12/27/2022 Negative  Final       Last Charlene Axel: 11/13/23  Medication Contract: 3/2/23   Last Fill: 9/17/23    Requested Prescriptions     Pending Prescriptions Disp Refills    gabapentin (NEURONTIN) 400 MG capsule 60 capsule 0     Sig: Take 1 tablets twice a day                                   Please approve or refuse this medication.    Nilay Fu LPN

## 2023-11-16 DIAGNOSIS — F90.9 ADULT ADHD (ATTENTION DEFICIT HYPERACTIVITY DISORDER): ICD-10-CM

## 2023-11-16 DIAGNOSIS — Z76.0 MEDICATION REFILL: ICD-10-CM

## 2023-11-16 DIAGNOSIS — F40.10 SOCIAL ANXIETY DISORDER: ICD-10-CM

## 2023-11-16 RX ORDER — FLUOXETINE HYDROCHLORIDE 40 MG/1
40 CAPSULE ORAL DAILY
Qty: 90 CAPSULE | Refills: 1 | Status: SHIPPED | OUTPATIENT
Start: 2023-11-16 | End: 2024-01-15

## 2023-11-16 NOTE — TELEPHONE ENCOUNTER
Wendi called requesting a refill of the below medication which has been pended for you:     Requested Prescriptions     Pending Prescriptions Disp Refills    FLUoxetine (PROZAC) 40 MG capsule [Pharmacy Med Name: FLUOXETINE 40MG CAPSULES] 90 capsule 2     Sig: TAKE 1 CAPSULE BY MOUTH DAILY       Last Appointment Date: 8/9/2023  Next Appointment Date: Visit date not found    Allergies   Allergen Reactions    Keflex [Cephalexin] Hives

## 2023-11-17 NOTE — TELEPHONE ENCOUNTER
Wendi called requesting a refill of the below medication which has been pended for you:     Requested Prescriptions     Pending Prescriptions Disp Refills    amphetamine-dextroamphetamine (ADDERALL XR) 30 MG extended release capsule 30 capsule 0     Sig: Take 1 capsule by mouth daily for 30 days. Max Daily Amount: 30 mg       Last Appointment Date: 8/9/2023  Next Appointment Date: 8/14/2024    Allergies   Allergen Reactions    Keflex [Cephalexin] Hives

## 2023-11-20 ENCOUNTER — TELEPHONE (OUTPATIENT)
Dept: PRIMARY CARE CLINIC | Age: 33
End: 2023-11-20

## 2023-11-20 RX ORDER — DEXTROAMPHETAMINE SACCHARATE, AMPHETAMINE ASPARTATE MONOHYDRATE, DEXTROAMPHETAMINE SULFATE AND AMPHETAMINE SULFATE 7.5; 7.5; 7.5; 7.5 MG/1; MG/1; MG/1; MG/1
30 CAPSULE, EXTENDED RELEASE ORAL DAILY
Qty: 30 CAPSULE | Refills: 0 | OUTPATIENT
Start: 2023-11-20 | End: 2023-12-20

## 2023-11-21 NOTE — TELEPHONE ENCOUNTER
I had called patient the day she dropped paperwork off and advised she would need to schedule an appointment. I called her back yesterday and she stated it was for neck pain. Patient did not complete her part of paperwork, called and left pain a voicemail advising her that in order to complete paperwork appropriately, she would need to call the office to schedule an appointment.
Patient calls to check on the status of FMLA paperwork she had dropped off at the  about 2 weeks ago. Please advise.
Spoke with patient again and she stated we have completed the same paperwork last year. I advised patient we would get paperwork completed and she could  sometime tomorrow.
Home

## 2023-11-27 ENCOUNTER — PATIENT MESSAGE (OUTPATIENT)
Dept: PRIMARY CARE CLINIC | Age: 33
End: 2023-11-27

## 2023-11-27 DIAGNOSIS — Z76.0 MEDICATION REFILL: ICD-10-CM

## 2023-11-27 DIAGNOSIS — F90.9 ADULT ADHD (ATTENTION DEFICIT HYPERACTIVITY DISORDER): ICD-10-CM

## 2023-11-27 RX ORDER — DEXTROAMPHETAMINE SACCHARATE, AMPHETAMINE ASPARTATE MONOHYDRATE, DEXTROAMPHETAMINE SULFATE AND AMPHETAMINE SULFATE 7.5; 7.5; 7.5; 7.5 MG/1; MG/1; MG/1; MG/1
30 CAPSULE, EXTENDED RELEASE ORAL DAILY
Qty: 30 CAPSULE | Refills: 0 | Status: SHIPPED | OUTPATIENT
Start: 2023-11-27 | End: 2023-12-27

## 2023-11-27 NOTE — TELEPHONE ENCOUNTER
From: Syed Edge  To: Dr. Springer Hemant: 11/27/2023 9:33 AM CST  Subject: Brielle Noel     Was my Adderall sent into the pharmacy?

## 2023-11-27 NOTE — TELEPHONE ENCOUNTER
Geo Brandt called to request a refill on her medication. Last office visit : 8/9/2023   Next office visit : Visit date not found     Last UDS:   Amphetamine Screen, Urine   Date Value Ref Range Status   12/27/2022 Postive  Final     Barbiturate Screen, Urine   Date Value Ref Range Status   12/27/2022 Negative  Final     Benzodiazepine Screen, Urine   Date Value Ref Range Status   12/27/2022 Negative  Final     Buprenorphine Urine   Date Value Ref Range Status   12/27/2022 Negative  Final     Cocaine Metabolite Screen, Urine   Date Value Ref Range Status   12/27/2022 Negative  Final     Gabapentin Screen, Urine   Date Value Ref Range Status   12/27/2022 Negative  Final     MDMA, Urine   Date Value Ref Range Status   12/27/2022 Negative  Final     Methamphetamine, Urine   Date Value Ref Range Status   12/27/2022 Negative  Final     Opiate Scrn, Ur   Date Value Ref Range Status   12/27/2022 Negative  Final     Oxycodone Screen, Ur   Date Value Ref Range Status   12/27/2022 Negative  Final     PCP Screen, Urine   Date Value Ref Range Status   12/27/2022 Negative  Final     Propoxyphene Screen, Urine   Date Value Ref Range Status   12/27/2022 Negative  Final     THC Screen, Urine   Date Value Ref Range Status   12/27/2022 Negative  Final     Tricyclic Antidepressants, Urine   Date Value Ref Range Status   12/27/2022 Negative  Final       Last Choco Champ: 11-  Medication Contract: 12-  Last Fill: 10-    Requested Prescriptions     Pending Prescriptions Disp Refills    amphetamine-dextroamphetamine (ADDERALL XR) 30 MG extended release capsule 30 capsule 0     Sig: Take 1 capsule by mouth daily for 30 days. Max Daily Amount: 30 mg         Please approve or refuse this medication.    Millie Hernandez MA

## 2023-11-30 ENCOUNTER — PATIENT MESSAGE (OUTPATIENT)
Dept: PRIMARY CARE CLINIC | Age: 33
End: 2023-11-30

## 2023-11-30 NOTE — TELEPHONE ENCOUNTER
From: Orin Angeles  To: Dr. Parry Cota: 11/30/2023 12:29 PM CST  Subject: Justine Chisholm    Is my [place] paperwork done?

## 2023-12-27 DIAGNOSIS — Z76.0 MEDICATION REFILL: ICD-10-CM

## 2023-12-27 DIAGNOSIS — F90.9 ADULT ADHD (ATTENTION DEFICIT HYPERACTIVITY DISORDER): ICD-10-CM

## 2023-12-27 RX ORDER — FLUTICASONE PROPIONATE 50 MCG
2 SPRAY, SUSPENSION (ML) NASAL DAILY
Qty: 48 G | OUTPATIENT
Start: 2023-12-27

## 2023-12-27 RX ORDER — DEXTROAMPHETAMINE SACCHARATE, AMPHETAMINE ASPARTATE MONOHYDRATE, DEXTROAMPHETAMINE SULFATE AND AMPHETAMINE SULFATE 7.5; 7.5; 7.5; 7.5 MG/1; MG/1; MG/1; MG/1
30 CAPSULE, EXTENDED RELEASE ORAL DAILY
Qty: 30 CAPSULE | Refills: 0 | Status: SHIPPED | OUTPATIENT
Start: 2023-12-27 | End: 2024-01-26

## 2023-12-27 RX ORDER — FLUTICASONE PROPIONATE 50 MCG
2 SPRAY, SUSPENSION (ML) NASAL DAILY
Qty: 16 G | Refills: 0 | Status: SHIPPED | OUTPATIENT
Start: 2023-12-27

## 2023-12-27 NOTE — TELEPHONE ENCOUNTER
Janet Diaz called to request a refill on her medication.       Last office visit : 8/9/2023   Next office visit : 1/3/2024     Requested Prescriptions     Pending Prescriptions Disp Refills    fluticasone (FLONASE) 50 MCG/ACT nasal spray [Pharmacy Med Name: FLUTICASONE 50MCG NASAL SP (120) RX] 48 g      Sig: SHAKE LIQUID AND USE 2 SPRAYS IN EACH NOSTRIL DAILY            Wing Patel LPN

## 2023-12-27 NOTE — TELEPHONE ENCOUNTER
Jessica Jos called to request a refill on her medication. Last office visit : 2023   Next office visit : 1/3/2024     Last UDS:   Amphetamine Screen, Urine   Date Value Ref Range Status   2022 Postive  Final     Barbiturate Screen, Urine   Date Value Ref Range Status   2022 Negative  Final     Benzodiazepine Screen, Urine   Date Value Ref Range Status   2022 Negative  Final     Buprenorphine Urine   Date Value Ref Range Status   2022 Negative  Final     Cocaine Metabolite Screen, Urine   Date Value Ref Range Status   2022 Negative  Final     Gabapentin Screen, Urine   Date Value Ref Range Status   2022 Negative  Final     MDMA, Urine   Date Value Ref Range Status   2022 Negative  Final     Methamphetamine, Urine   Date Value Ref Range Status   2022 Negative  Final     Opiate Scrn, Ur   Date Value Ref Range Status   2022 Negative  Final     Oxycodone Screen, Ur   Date Value Ref Range Status   2022 Negative  Final     PCP Screen, Urine   Date Value Ref Range Status   2022 Negative  Final     Propoxyphene Screen, Urine   Date Value Ref Range Status   2022 Negative  Final     THC Screen, Urine   Date Value Ref Range Status   2022 Negative  Final     Tricyclic Antidepressants, Urine   Date Value Ref Range Status   2022 Negative  Final       Last Beauty Hammed: 23  Medication Contract: 3/2/23   Last Fill: 23    Requested Prescriptions     Pending Prescriptions Disp Refills    fluticasone (FLONASE) 50 MCG/ACT nasal spray 16 g 3     Si sprays by Nasal route daily    amphetamine-dextroamphetamine (ADDERALL XR) 30 MG extended release capsule 30 capsule 0     Sig: Take 1 capsule by mouth daily for 30 days. Max Daily Amount: 30 mg                                   Please approve or refuse this medication.    Gino Pretty, AIRAMN

## 2024-01-03 ENCOUNTER — OFFICE VISIT (OUTPATIENT)
Dept: PRIMARY CARE CLINIC | Age: 34
End: 2024-01-03
Payer: COMMERCIAL

## 2024-01-03 VITALS
DIASTOLIC BLOOD PRESSURE: 82 MMHG | WEIGHT: 195 LBS | TEMPERATURE: 97.9 F | BODY MASS INDEX: 29.65 KG/M2 | SYSTOLIC BLOOD PRESSURE: 120 MMHG | OXYGEN SATURATION: 99 % | HEART RATE: 76 BPM

## 2024-01-03 DIAGNOSIS — M62.838 MUSCLE SPASMS OF NECK: ICD-10-CM

## 2024-01-03 DIAGNOSIS — Z76.0 MEDICINE REFILL: ICD-10-CM

## 2024-01-03 DIAGNOSIS — Z79.899 HIGH RISK MEDICATION USE: ICD-10-CM

## 2024-01-03 DIAGNOSIS — F90.9 ADULT ADHD (ATTENTION DEFICIT HYPERACTIVITY DISORDER): Primary | ICD-10-CM

## 2024-01-03 DIAGNOSIS — M54.12 CERVICAL RADICULOPATHY: ICD-10-CM

## 2024-01-03 PROCEDURE — 99213 OFFICE O/P EST LOW 20 MIN: CPT | Performed by: FAMILY MEDICINE

## 2024-01-03 RX ORDER — GABAPENTIN 400 MG/1
CAPSULE ORAL
Qty: 60 CAPSULE | Refills: 0 | Status: SHIPPED | OUTPATIENT
Start: 2024-01-03 | End: 2024-01-29

## 2024-01-03 RX ORDER — DEXTROAMPHETAMINE SACCHARATE, AMPHETAMINE ASPARTATE, DEXTROAMPHETAMINE SULFATE AND AMPHETAMINE SULFATE 2.5; 2.5; 2.5; 2.5 MG/1; MG/1; MG/1; MG/1
10 TABLET ORAL DAILY
Qty: 30 TABLET | Refills: 0 | Status: SHIPPED | OUTPATIENT
Start: 2024-01-03 | End: 2024-02-02

## 2024-01-03 RX ORDER — METHOCARBAMOL 750 MG/1
750 TABLET, FILM COATED ORAL 2 TIMES DAILY
Qty: 60 TABLET | Refills: 0 | Status: SHIPPED | OUTPATIENT
Start: 2024-01-03

## 2024-01-03 ASSESSMENT — PATIENT HEALTH QUESTIONNAIRE - PHQ9
SUM OF ALL RESPONSES TO PHQ QUESTIONS 1-9: 0
8. MOVING OR SPEAKING SO SLOWLY THAT OTHER PEOPLE COULD HAVE NOTICED. OR THE OPPOSITE, BEING SO FIGETY OR RESTLESS THAT YOU HAVE BEEN MOVING AROUND A LOT MORE THAN USUAL: 0
7. TROUBLE CONCENTRATING ON THINGS, SUCH AS READING THE NEWSPAPER OR WATCHING TELEVISION: 0
2. FEELING DOWN, DEPRESSED OR HOPELESS: 0
SUM OF ALL RESPONSES TO PHQ9 QUESTIONS 1 & 2: 0
9. THOUGHTS THAT YOU WOULD BE BETTER OFF DEAD, OR OF HURTING YOURSELF: 0
3. TROUBLE FALLING OR STAYING ASLEEP: 0
10. IF YOU CHECKED OFF ANY PROBLEMS, HOW DIFFICULT HAVE THESE PROBLEMS MADE IT FOR YOU TO DO YOUR WORK, TAKE CARE OF THINGS AT HOME, OR GET ALONG WITH OTHER PEOPLE: 0
6. FEELING BAD ABOUT YOURSELF - OR THAT YOU ARE A FAILURE OR HAVE LET YOURSELF OR YOUR FAMILY DOWN: 0
4. FEELING TIRED OR HAVING LITTLE ENERGY: 0
5. POOR APPETITE OR OVEREATING: 0
SUM OF ALL RESPONSES TO PHQ QUESTIONS 1-9: 0
1. LITTLE INTEREST OR PLEASURE IN DOING THINGS: 0

## 2024-01-03 NOTE — PROGRESS NOTES
[facility]  [address]  [address]  [place] [address]  Dept: [phone]  Dept Fax: [phone]  Loc: [phone]      Subjective:     Chief Complaint   Patient presents with    Follow-up     Discuss medications       HPI:  Wendi Pulido is a 33 y.o. female presents today  for here routine follow-up visit  She is requesting an adjustment of her ADHD medication. She states that towards the early afternoon, the medication is already starting to wear off  Chronic meds reviewed and updated. Med refills requested    ROS:   Review of Systems   Constitutional: Negative.    HENT: Negative.     Respiratory: Negative.     Cardiovascular: Negative.    Gastrointestinal: Negative.    Endocrine: Negative.    Genitourinary: Negative.    Musculoskeletal: Negative.    Neurological: Negative.    Psychiatric/Behavioral:  Positive for decreased concentration. Negative for dysphoric mood. The patient is not nervous/anxious.         Anxiety and depression - improved on current  dose of Prozac       PMHx:  Past Medical History:   Diagnosis Date    Anxiety and depression 1/20/2019    Anxiety and depression     GERD (gastroesophageal reflux disease)      Patient Active Problem List   Diagnosis    Anxiety and depression    Adult ADHD (attention deficit hyperactivity disorder)    Varicose veins of legs    Chronic gastroesophageal reflux disease    Displacement of cervical intervertebral disc       PSHx:  Past Surgical History:   Procedure Laterality Date    CHOLECYSTECTOMY      UPPER GASTROINTESTINAL ENDOSCOPY N/A 05/04/2021    maribell Gaming 54 Fr Dil, partially obstructing lower esoph ring, (-)H. pylori, (+) GERD wo EOE, sm 1-2mm erosions sugg of chemical gastritis,    UPPER GASTROINTESTINAL ENDOSCOPY  05/04/2021    Speedy Gaming weighted Bougie dilator-54 Fr    WISDOM TOOTH EXTRACTION         PFHx:  Family History   Problem Relation Age of Onset    Diabetes

## 2024-01-04 PROBLEM — R60.0 BILATERAL LOWER EXTREMITY EDEMA: Status: RESOLVED | Noted: 2023-04-17 | Resolved: 2024-01-04

## 2024-01-04 ASSESSMENT — ENCOUNTER SYMPTOMS
RESPIRATORY NEGATIVE: 1
GASTROINTESTINAL NEGATIVE: 1

## 2024-01-25 DIAGNOSIS — F90.9 ADULT ADHD (ATTENTION DEFICIT HYPERACTIVITY DISORDER): ICD-10-CM

## 2024-01-25 DIAGNOSIS — Z76.0 MEDICATION REFILL: ICD-10-CM

## 2024-01-25 NOTE — TELEPHONE ENCOUNTER
Wendi DEYSI Pulido called to request a refill on her medication.      Last office visit : 1/3/2024   Next office visit : 3/5/2024     Last UDS:   Amphetamine Screen, Urine   Date Value Ref Range Status   01/03/2024 neg  Final     Barbiturate Screen, Urine   Date Value Ref Range Status   01/03/2024 neg  Final     Benzodiazepine Screen, Urine   Date Value Ref Range Status   01/03/2024 neg  Final     Buprenorphine Urine   Date Value Ref Range Status   01/03/2024 neg  Final     Cocaine Metabolite Screen, Urine   Date Value Ref Range Status   01/03/2024 neg  Final     Gabapentin Screen, Urine   Date Value Ref Range Status   01/03/2024 neg  Final     MDMA, Urine   Date Value Ref Range Status   01/03/2024 neg  Final     Methamphetamine, Urine   Date Value Ref Range Status   01/03/2024 neg  Final     Opiate Scrn, Ur   Date Value Ref Range Status   01/03/2024 neg  Final     Oxycodone Screen, Ur   Date Value Ref Range Status   01/03/2024 neg  Final     PCP Screen, Urine   Date Value Ref Range Status   01/03/2024 neg  Final     Propoxyphene Screen, Urine   Date Value Ref Range Status   01/03/2024 neg  Final     THC Screen, Urine   Date Value Ref Range Status   01/03/2024 neg  Final     Tricyclic Antidepressants, Urine   Date Value Ref Range Status   01/03/2024 neg  Final       Last Pedro Pablo: 11/13/23  Medication Contract: 1/3/24   Last Fill: 12/27/23    Requested Prescriptions     Pending Prescriptions Disp Refills    amphetamine-dextroamphetamine (ADDERALL XR) 30 MG extended release capsule 30 capsule 0     Sig: Take 1 capsule by mouth daily for 30 days. Max Daily Amount: 30 mg                                   Please approve or refuse this medication.   Chhaya Pavon LPN

## 2024-01-26 RX ORDER — DEXTROAMPHETAMINE SACCHARATE, AMPHETAMINE ASPARTATE MONOHYDRATE, DEXTROAMPHETAMINE SULFATE AND AMPHETAMINE SULFATE 7.5; 7.5; 7.5; 7.5 MG/1; MG/1; MG/1; MG/1
30 CAPSULE, EXTENDED RELEASE ORAL DAILY
Qty: 30 CAPSULE | Refills: 0 | Status: SHIPPED | OUTPATIENT
Start: 2024-01-26 | End: 2024-02-25

## 2024-02-04 DIAGNOSIS — Z76.0 MEDICATION REFILL: ICD-10-CM

## 2024-02-05 DIAGNOSIS — Z76.0 MEDICATION REFILL: ICD-10-CM

## 2024-02-05 RX ORDER — CLONIDINE HYDROCHLORIDE 0.1 MG/1
TABLET ORAL
Qty: 30 TABLET | Refills: 0 | Status: SHIPPED | OUTPATIENT
Start: 2024-02-05

## 2024-02-05 RX ORDER — CLONIDINE HYDROCHLORIDE 0.1 MG/1
TABLET ORAL
Qty: 90 TABLET | OUTPATIENT
Start: 2024-02-05

## 2024-02-05 NOTE — TELEPHONE ENCOUNTER
Wendi Pulido called to request a refill on her medication.      Last office visit : 1/3/2024   Next office visit : 3/5/2024     Requested Prescriptions     Pending Prescriptions Disp Refills    cloNIDine (CATAPRES) 0.1 MG tablet [Pharmacy Med Name: CLONIDINE 0.1MG TABLETS] 90 tablet      Sig: TAKE 1 TABLET BY MOUTH EVERY NIGHT AS NEEDED FOR INSOMNIA            Chhaya Pavon LPN

## 2024-02-05 NOTE — TELEPHONE ENCOUNTER
Wendi Pulido called to request a refill on her medication.      Last office visit : 1/3/2024   Next office visit : 3/5/2024     Requested Prescriptions     Pending Prescriptions Disp Refills    cloNIDine (CATAPRES) 0.1 MG tablet [Pharmacy Med Name: CLONIDINE 0.1MG TABLETS] 90 tablet 1     Sig: TAKE 1 TABLET BY MOUTH EVERY NIGHT AS NEEDED FOR INSOMNIA            Chhaya Pavon LPN

## 2024-02-28 DIAGNOSIS — F90.9 ADULT ADHD (ATTENTION DEFICIT HYPERACTIVITY DISORDER): ICD-10-CM

## 2024-02-28 DIAGNOSIS — Z76.0 MEDICATION REFILL: ICD-10-CM

## 2024-02-28 RX ORDER — DEXTROAMPHETAMINE SACCHARATE, AMPHETAMINE ASPARTATE MONOHYDRATE, DEXTROAMPHETAMINE SULFATE AND AMPHETAMINE SULFATE 7.5; 7.5; 7.5; 7.5 MG/1; MG/1; MG/1; MG/1
30 CAPSULE, EXTENDED RELEASE ORAL DAILY
Qty: 30 CAPSULE | Refills: 0 | Status: SHIPPED | OUTPATIENT
Start: 2024-02-28 | End: 2024-03-29

## 2024-02-28 RX ORDER — DEXTROAMPHETAMINE SACCHARATE, AMPHETAMINE ASPARTATE, DEXTROAMPHETAMINE SULFATE AND AMPHETAMINE SULFATE 2.5; 2.5; 2.5; 2.5 MG/1; MG/1; MG/1; MG/1
10 TABLET ORAL DAILY
Qty: 30 TABLET | Refills: 0 | Status: SHIPPED | OUTPATIENT
Start: 2024-02-28 | End: 2024-03-29

## 2024-02-28 NOTE — TELEPHONE ENCOUNTER
Wendi JO Pulido called to request a refill on her medication.      Last office visit : 1/3/2024   Next office visit : 3/5/2024     Last UDS:   Amphetamine Screen, Urine   Date Value Ref Range Status   01/03/2024 neg  Final     Barbiturate Screen, Urine   Date Value Ref Range Status   01/03/2024 neg  Final     Benzodiazepine Screen, Urine   Date Value Ref Range Status   01/03/2024 neg  Final     Buprenorphine Urine   Date Value Ref Range Status   01/03/2024 neg  Final     Cocaine Metabolite Screen, Urine   Date Value Ref Range Status   01/03/2024 neg  Final     Gabapentin Screen, Urine   Date Value Ref Range Status   01/03/2024 neg  Final     MDMA, Urine   Date Value Ref Range Status   01/03/2024 neg  Final     Methamphetamine, Urine   Date Value Ref Range Status   01/03/2024 neg  Final     Opiate Scrn, Ur   Date Value Ref Range Status   01/03/2024 neg  Final     Oxycodone Screen, Ur   Date Value Ref Range Status   01/03/2024 neg  Final     PCP Screen, Urine   Date Value Ref Range Status   01/03/2024 neg  Final     Propoxyphene Screen, Urine   Date Value Ref Range Status   01/03/2024 neg  Final     THC Screen, Urine   Date Value Ref Range Status   01/03/2024 neg  Final     Tricyclic Antidepressants, Urine   Date Value Ref Range Status   01/03/2024 neg  Final       Last Pedro Pablo: 02-  Medication Contract: 01-  Last Fill: Adderall XR 01-  Adderall 10 01-  Requested Prescriptions     Pending Prescriptions Disp Refills    amphetamine-dextroamphetamine (ADDERALL, 10MG,) 10 MG tablet 30 tablet 0     Sig: Take 1 tablet by mouth daily for 30 days. Max Daily Amount: 10 mg    amphetamine-dextroamphetamine (ADDERALL XR) 30 MG extended release capsule 30 capsule 0     Sig: Take 1 capsule by mouth daily for 30 days. Max Daily Amount: 30 mg         Please approve or refuse this medication.   Yuni Traore MA

## 2024-03-06 ENCOUNTER — PATIENT MESSAGE (OUTPATIENT)
Dept: PRIMARY CARE CLINIC | Age: 34
End: 2024-03-06

## 2024-03-06 DIAGNOSIS — F90.9 ADULT ADHD (ATTENTION DEFICIT HYPERACTIVITY DISORDER): ICD-10-CM

## 2024-03-06 DIAGNOSIS — Z76.0 MEDICATION REFILL: ICD-10-CM

## 2024-03-07 RX ORDER — DEXTROAMPHETAMINE SACCHARATE, AMPHETAMINE ASPARTATE, DEXTROAMPHETAMINE SULFATE AND AMPHETAMINE SULFATE 2.5; 2.5; 2.5; 2.5 MG/1; MG/1; MG/1; MG/1
10 TABLET ORAL DAILY
Qty: 30 TABLET | Refills: 0 | Status: SHIPPED | OUTPATIENT
Start: 2024-03-07 | End: 2024-04-06

## 2024-03-07 RX ORDER — DEXTROAMPHETAMINE SACCHARATE, AMPHETAMINE ASPARTATE MONOHYDRATE, DEXTROAMPHETAMINE SULFATE AND AMPHETAMINE SULFATE 7.5; 7.5; 7.5; 7.5 MG/1; MG/1; MG/1; MG/1
30 CAPSULE, EXTENDED RELEASE ORAL DAILY
Qty: 30 CAPSULE | Refills: 0 | Status: SHIPPED | OUTPATIENT
Start: 2024-03-07 | End: 2024-04-06

## 2024-03-07 NOTE — TELEPHONE ENCOUNTER
From: Wendi Pulido  To: Dr. Gela Velasco  Sent: 3/6/2024 3:27 PM CST  Subject: Hello    Thought both my Adderall was sent in

## 2024-03-07 NOTE — TELEPHONE ENCOUNTER
Wendi JO Pulido called to request a refill on her medication.      Last office visit : 1/3/2024   Next office visit : 8/14/2024     Last UDS:   Amphetamine Screen, Urine   Date Value Ref Range Status   01/03/2024 neg  Final     Barbiturate Screen, Urine   Date Value Ref Range Status   01/03/2024 neg  Final     Benzodiazepine Screen, Urine   Date Value Ref Range Status   01/03/2024 neg  Final     Buprenorphine Urine   Date Value Ref Range Status   01/03/2024 neg  Final     Cocaine Metabolite Screen, Urine   Date Value Ref Range Status   01/03/2024 neg  Final     Gabapentin Screen, Urine   Date Value Ref Range Status   01/03/2024 neg  Final     MDMA, Urine   Date Value Ref Range Status   01/03/2024 neg  Final     Methamphetamine, Urine   Date Value Ref Range Status   01/03/2024 neg  Final     Opiate Scrn, Ur   Date Value Ref Range Status   01/03/2024 neg  Final     Oxycodone Screen, Ur   Date Value Ref Range Status   01/03/2024 neg  Final     PCP Screen, Urine   Date Value Ref Range Status   01/03/2024 neg  Final     Propoxyphene Screen, Urine   Date Value Ref Range Status   01/03/2024 neg  Final     THC Screen, Urine   Date Value Ref Range Status   01/03/2024 neg  Final     Tricyclic Antidepressants, Urine   Date Value Ref Range Status   01/03/2024 neg  Final       Last Pedro Pablo: 03/07/2024  Medication Contract: 01-  Last Fill: 01-    Requested Prescriptions     Pending Prescriptions Disp Refills    amphetamine-dextroamphetamine (ADDERALL XR) 30 MG extended release capsule 30 capsule 0     Sig: Take 1 capsule by mouth daily for 30 days. Max Daily Amount: 30 mg    amphetamine-dextroamphetamine (ADDERALL, 10MG,) 10 MG tablet 30 tablet 0     Sig: Take 1 tablet by mouth daily for 30 days. Max Daily Amount: 10 mg         Please approve or refuse this medication.   Yuni Traore MA

## 2024-03-15 ENCOUNTER — OFFICE VISIT (OUTPATIENT)
Age: 34
End: 2024-03-15

## 2024-03-15 VITALS
RESPIRATION RATE: 20 BRPM | HEART RATE: 95 BPM | WEIGHT: 198 LBS | TEMPERATURE: 98.2 F | OXYGEN SATURATION: 99 % | SYSTOLIC BLOOD PRESSURE: 130 MMHG | BODY MASS INDEX: 30.11 KG/M2 | DIASTOLIC BLOOD PRESSURE: 78 MMHG

## 2024-03-15 DIAGNOSIS — J02.9 SORE THROAT: ICD-10-CM

## 2024-03-15 DIAGNOSIS — J30.9 ALLERGIC RHINITIS, UNSPECIFIED SEASONALITY, UNSPECIFIED TRIGGER: Primary | ICD-10-CM

## 2024-03-15 LAB
INFLUENZA A ANTIBODY: NEGATIVE
INFLUENZA B ANTIBODY: NEGATIVE
S PYO AG THROAT QL: NORMAL

## 2024-03-15 ASSESSMENT — ENCOUNTER SYMPTOMS
RHINORRHEA: 0
CHEST TIGHTNESS: 0
CONSTIPATION: 0
BACK PAIN: 0
COUGH: 1
EYE DISCHARGE: 0
SINUS PAIN: 0
SHORTNESS OF BREATH: 0
WHEEZING: 0
ABDOMINAL PAIN: 0
EYE ITCHING: 0
SORE THROAT: 1
NAUSEA: 0
EYE REDNESS: 0
VOMITING: 0
DIARRHEA: 0

## 2024-03-15 NOTE — PATIENT INSTRUCTIONS
Strep and flu test negative     Supportive care recommendations:   - Increase fluid intake. Recommend water and pedialyte.  - Rest  - OTC antihistamine, such as Claritin or Zyrtec  - OTC Flonase  - OTC Delysm or Robitussin for cough and congestion   - OTC motrin/tylenol for fever and/or body aches, unless contraindicated   - Utilize cool mist humidifier at night for nasal congestion  - Utilize throat lozenges, chloraseptic spray, honey, or salt water gargles for sore throat.   - The patient is to follow up with PCP or return to clinic if symptoms worsen/fail to improve.    Any condition can change, despite proper treatment. Therefore, if symptoms still persist or worsen after treatment plan intitated today, patient is to go to the nearest ER, call PCP, or return to urgent care for further evaluation. Urgent Care evaluation today is not a substitute for PCP visit. Follow up care is the patient's responsibility to discuss and review this UC visit.

## 2024-05-28 DIAGNOSIS — Z76.0 MEDICATION REFILL: ICD-10-CM

## 2024-05-28 DIAGNOSIS — F40.10 SOCIAL ANXIETY DISORDER: ICD-10-CM

## 2024-05-28 DIAGNOSIS — F90.9 ADULT ADHD (ATTENTION DEFICIT HYPERACTIVITY DISORDER): ICD-10-CM

## 2024-05-28 RX ORDER — DEXTROAMPHETAMINE SACCHARATE, AMPHETAMINE ASPARTATE MONOHYDRATE, DEXTROAMPHETAMINE SULFATE AND AMPHETAMINE SULFATE 7.5; 7.5; 7.5; 7.5 MG/1; MG/1; MG/1; MG/1
30 CAPSULE, EXTENDED RELEASE ORAL DAILY
Qty: 30 CAPSULE | Refills: 0 | Status: SHIPPED | OUTPATIENT
Start: 2024-05-28 | End: 2024-06-27

## 2024-05-28 RX ORDER — FLUTICASONE PROPIONATE 50 MCG
2 SPRAY, SUSPENSION (ML) NASAL DAILY
Qty: 16 G | Refills: 0 | Status: SHIPPED | OUTPATIENT
Start: 2024-05-28

## 2024-05-28 RX ORDER — FLUOXETINE HYDROCHLORIDE 40 MG/1
40 CAPSULE ORAL DAILY
Qty: 90 CAPSULE | Refills: 0 | Status: SHIPPED | OUTPATIENT
Start: 2024-05-28 | End: 2024-08-26

## 2024-05-29 RX ORDER — FLUTICASONE PROPIONATE 50 MCG
2 SPRAY, SUSPENSION (ML) NASAL DAILY
Qty: 48 G | OUTPATIENT
Start: 2024-05-29

## 2024-06-29 DIAGNOSIS — Z76.0 MEDICATION REFILL: ICD-10-CM

## 2024-06-29 DIAGNOSIS — F90.9 ADULT ADHD (ATTENTION DEFICIT HYPERACTIVITY DISORDER): ICD-10-CM

## 2024-07-01 RX ORDER — DEXTROAMPHETAMINE SACCHARATE, AMPHETAMINE ASPARTATE MONOHYDRATE, DEXTROAMPHETAMINE SULFATE AND AMPHETAMINE SULFATE 7.5; 7.5; 7.5; 7.5 MG/1; MG/1; MG/1; MG/1
30 CAPSULE, EXTENDED RELEASE ORAL DAILY
Qty: 30 CAPSULE | Refills: 0 | Status: SHIPPED | OUTPATIENT
Start: 2024-07-01 | End: 2024-07-31

## 2024-07-03 PROCEDURE — 87801 DETECT AGNT MULT DNA AMPLI: CPT | Performed by: PHYSICIAN ASSISTANT

## 2024-07-03 PROCEDURE — 87798 DETECT AGENT NOS DNA AMP: CPT | Performed by: PHYSICIAN ASSISTANT

## 2024-07-06 ENCOUNTER — PATIENT ROUNDING (BHMG ONLY) (OUTPATIENT)
Dept: URGENT CARE | Facility: CLINIC | Age: 34
End: 2024-07-06
Payer: COMMERCIAL

## 2024-07-06 NOTE — ED NOTES
Thank you for letting us care for you in your recent visit to our urgent care center. We would love to hear about your experience with us. Was this the first time you have visited our location?    We’re always looking for ways to make our patients’ experiences even better. Do you have any recommendations on ways we may improve?     I appreciate you taking the time to respond. Please be on the lookout for a survey about your recent visit from [employer] via text or email. We would greatly appreciate if you could fill that out and turn it back in. We want your voice to be heard and we value your feedback.   Thank you for choosing [facility] for your healthcare needs.

## 2024-07-12 ENCOUNTER — APPOINTMENT (OUTPATIENT)
Dept: GENERAL RADIOLOGY | Facility: HOSPITAL | Age: 34
End: 2024-07-12
Payer: COMMERCIAL

## 2024-07-12 PROCEDURE — 74018 RADEX ABDOMEN 1 VIEW: CPT

## 2024-07-12 PROCEDURE — 87086 URINE CULTURE/COLONY COUNT: CPT | Performed by: NURSE PRACTITIONER

## 2024-07-18 ENCOUNTER — OFFICE VISIT (OUTPATIENT)
Age: 34
End: 2024-07-18
Payer: COMMERCIAL

## 2024-07-18 VITALS
RESPIRATION RATE: 18 BRPM | DIASTOLIC BLOOD PRESSURE: 66 MMHG | SYSTOLIC BLOOD PRESSURE: 116 MMHG | OXYGEN SATURATION: 99 % | TEMPERATURE: 97.5 F | HEART RATE: 93 BPM | WEIGHT: 199 LBS | HEIGHT: 68 IN | BODY MASS INDEX: 30.16 KG/M2

## 2024-07-18 DIAGNOSIS — J02.9 SORE THROAT: Primary | ICD-10-CM

## 2024-07-18 DIAGNOSIS — H93.8X3 EAR PRESSURE, BILATERAL: ICD-10-CM

## 2024-07-18 DIAGNOSIS — J30.9 ALLERGIC RHINITIS, UNSPECIFIED SEASONALITY, UNSPECIFIED TRIGGER: ICD-10-CM

## 2024-07-18 DIAGNOSIS — H65.93 MIDDLE EAR EFFUSION, BILATERAL: ICD-10-CM

## 2024-07-18 LAB — S PYO AG THROAT QL: NORMAL

## 2024-07-18 PROCEDURE — 99213 OFFICE O/P EST LOW 20 MIN: CPT

## 2024-07-18 PROCEDURE — 87880 STREP A ASSAY W/OPTIC: CPT

## 2024-07-18 RX ORDER — AZELASTINE 1 MG/ML
2 SPRAY, METERED NASAL 2 TIMES DAILY
Qty: 120 ML | Refills: 0 | Status: SHIPPED | OUTPATIENT
Start: 2024-07-18

## 2024-07-18 RX ORDER — CETIRIZINE HYDROCHLORIDE 10 MG/1
10 TABLET ORAL DAILY
Qty: 30 TABLET | Refills: 0 | Status: SHIPPED | OUTPATIENT
Start: 2024-07-18

## 2024-07-18 ASSESSMENT — ENCOUNTER SYMPTOMS
CONSTIPATION: 0
WHEEZING: 0
COLOR CHANGE: 0
ABDOMINAL PAIN: 0
SINUS PRESSURE: 0
NAUSEA: 0
RHINORRHEA: 0
EYE DISCHARGE: 0
DIARRHEA: 0
EYE ITCHING: 0
VOMITING: 0
SHORTNESS OF BREATH: 0
SORE THROAT: 1
COUGH: 0
BLOOD IN STOOL: 0

## 2024-07-18 NOTE — PROGRESS NOTES
worsen or fail to improve.         Discussed use, benefits, and side effects of any prescribed medications. All patient questions were answered. Patient voiced understanding of care plan.   Patient was given educational materials - see patient instructions below.     Patient Instructions   - Increase fluid intake  - Recommended OTC flonase  - Recommended OTC claritin or zyrtec  - The patient is to follow up with PCP or return to clinic if symptoms worsen/fail to improve.     Any condition can change, despite proper treatment. Therefore, if symptoms still persist or worsen after treatment plan intitated today, either go to the nearest ER, or call PCP, or return to UC for further evaluation.    Urgent Care evaluation today is not a substitute for PCP visit. Follow up care is your responsibility to discuss and review this UC visit.     Discussed use, benefits, and side effects of any prescribed medications. All patient questions were answered. Patient demonstrates understanding and agrees with care plan. Patient was given educational materials - see patient instructions below         Electronically signed by MICAELA Rucker NP on 7/18/2024 at 12:23 PM

## 2024-07-18 NOTE — PATIENT INSTRUCTIONS
- Increase fluid intake  - Recommended OTC flonase  - Recommended OTC claritin or zyrtec  - The patient is to follow up with PCP or return to clinic if symptoms worsen/fail to improve.     Any condition can change, despite proper treatment. Therefore, if symptoms still persist or worsen after treatment plan intitated today, either go to the nearest ER, or call PCP, or return to UC for further evaluation.    Urgent Care evaluation today is not a substitute for PCP visit. Follow up care is your responsibility to discuss and review this UC visit.     Discussed use, benefits, and side effects of any prescribed medications. All patient questions were answered. Patient demonstrates understanding and agrees with care plan. Patient was given educational materials - see patient instructions below

## 2024-08-12 ENCOUNTER — TELEPHONE (OUTPATIENT)
Dept: PRIMARY CARE CLINIC | Age: 34
End: 2024-08-12

## 2024-08-12 DIAGNOSIS — Z00.00 ANNUAL PHYSICAL EXAM: Primary | ICD-10-CM

## 2024-08-12 NOTE — TELEPHONE ENCOUNTER
Patient is scheduled to come in and see us on 08-. Patient sent [employer] message requesting lab orders to be placed so that she can complete for her physical. Orders placed.

## 2024-08-14 ENCOUNTER — OFFICE VISIT (OUTPATIENT)
Dept: PRIMARY CARE CLINIC | Age: 34
End: 2024-08-14
Payer: COMMERCIAL

## 2024-08-14 VITALS
DIASTOLIC BLOOD PRESSURE: 78 MMHG | BODY MASS INDEX: 29.7 KG/M2 | OXYGEN SATURATION: 98 % | WEIGHT: 196 LBS | TEMPERATURE: 98.2 F | SYSTOLIC BLOOD PRESSURE: 108 MMHG | HEIGHT: 68 IN | HEART RATE: 85 BPM

## 2024-08-14 DIAGNOSIS — Z00.00 ANNUAL PHYSICAL EXAM: ICD-10-CM

## 2024-08-14 DIAGNOSIS — F90.9 ADULT ADHD (ATTENTION DEFICIT HYPERACTIVITY DISORDER): ICD-10-CM

## 2024-08-14 DIAGNOSIS — Z00.00 ANNUAL PHYSICAL EXAM: Primary | ICD-10-CM

## 2024-08-14 DIAGNOSIS — F40.10 SOCIAL ANXIETY DISORDER: ICD-10-CM

## 2024-08-14 DIAGNOSIS — Z76.0 MEDICATION REFILL: ICD-10-CM

## 2024-08-14 LAB
ALBUMIN SERPL-MCNC: 3.8 G/DL (ref 3.5–5.2)
ALP SERPL-CCNC: 55 U/L (ref 35–104)
ALT SERPL-CCNC: 15 U/L (ref 5–33)
ANION GAP SERPL CALCULATED.3IONS-SCNC: 8 MMOL/L (ref 7–19)
AST SERPL-CCNC: 21 U/L (ref 5–32)
BASOPHILS # BLD: 0 K/UL (ref 0–0.2)
BASOPHILS NFR BLD: 0.8 % (ref 0–1)
BILIRUB SERPL-MCNC: 0.5 MG/DL (ref 0.2–1.2)
BUN SERPL-MCNC: 11 MG/DL (ref 6–20)
CALCIUM SERPL-MCNC: 9.2 MG/DL (ref 8.6–10)
CHLORIDE SERPL-SCNC: 103 MMOL/L (ref 98–111)
CHOLEST SERPL-MCNC: 190 MG/DL (ref 160–199)
CO2 SERPL-SCNC: 26 MMOL/L (ref 22–29)
CREAT SERPL-MCNC: 0.4 MG/DL (ref 0.5–0.9)
EOSINOPHIL # BLD: 0 K/UL (ref 0–0.6)
EOSINOPHIL NFR BLD: 0.8 % (ref 0–5)
ERYTHROCYTE [DISTWIDTH] IN BLOOD BY AUTOMATED COUNT: 12 % (ref 11.5–14.5)
GLUCOSE SERPL-MCNC: 93 MG/DL (ref 74–109)
HCT VFR BLD AUTO: 41.1 % (ref 37–47)
HDLC SERPL-MCNC: 80 MG/DL (ref 65–121)
HGB BLD-MCNC: 14 G/DL (ref 12–16)
IMM GRANULOCYTES # BLD: 0 K/UL
LDLC SERPL CALC-MCNC: 103 MG/DL
LYMPHOCYTES # BLD: 1.8 K/UL (ref 1.1–4.5)
LYMPHOCYTES NFR BLD: 36.8 % (ref 20–40)
MCH RBC QN AUTO: 32.5 PG (ref 27–31)
MCHC RBC AUTO-ENTMCNC: 34.1 G/DL (ref 33–37)
MCV RBC AUTO: 95.4 FL (ref 81–99)
MONOCYTES # BLD: 0.5 K/UL (ref 0–0.9)
MONOCYTES NFR BLD: 10.2 % (ref 0–10)
NEUTROPHILS # BLD: 2.5 K/UL (ref 1.5–7.5)
NEUTS SEG NFR BLD: 51.2 % (ref 50–65)
PLATELET # BLD AUTO: 308 K/UL (ref 130–400)
PMV BLD AUTO: 9 FL (ref 9.4–12.3)
POTASSIUM SERPL-SCNC: 4.8 MMOL/L (ref 3.5–5)
PROT SERPL-MCNC: 5.7 G/DL (ref 6.6–8.7)
RBC # BLD AUTO: 4.31 M/UL (ref 4.2–5.4)
SODIUM SERPL-SCNC: 137 MMOL/L (ref 136–145)
TRIGL SERPL-MCNC: 37 MG/DL (ref 0–149)
WBC # BLD AUTO: 4.8 K/UL (ref 4.8–10.8)

## 2024-08-14 PROCEDURE — 99395 PREV VISIT EST AGE 18-39: CPT | Performed by: FAMILY MEDICINE

## 2024-08-14 RX ORDER — FLUOXETINE HYDROCHLORIDE 40 MG/1
40 CAPSULE ORAL DAILY
Qty: 90 CAPSULE | Refills: 0 | Status: SHIPPED | OUTPATIENT
Start: 2024-08-14 | End: 2024-11-12

## 2024-08-14 RX ORDER — CLONIDINE HYDROCHLORIDE 0.1 MG/1
TABLET ORAL
Qty: 90 TABLET | Refills: 0 | Status: SHIPPED | OUTPATIENT
Start: 2024-08-14

## 2024-08-14 RX ORDER — DEXTROAMPHETAMINE SACCHARATE, AMPHETAMINE ASPARTATE, DEXTROAMPHETAMINE SULFATE AND AMPHETAMINE SULFATE 2.5; 2.5; 2.5; 2.5 MG/1; MG/1; MG/1; MG/1
10 TABLET ORAL DAILY
Qty: 30 TABLET | Refills: 0 | Status: SHIPPED | OUTPATIENT
Start: 2024-08-14 | End: 2024-09-13

## 2024-08-14 RX ORDER — DEXTROAMPHETAMINE SACCHARATE, AMPHETAMINE ASPARTATE MONOHYDRATE, DEXTROAMPHETAMINE SULFATE AND AMPHETAMINE SULFATE 7.5; 7.5; 7.5; 7.5 MG/1; MG/1; MG/1; MG/1
30 CAPSULE, EXTENDED RELEASE ORAL DAILY
Qty: 30 CAPSULE | Refills: 0 | Status: SHIPPED | OUTPATIENT
Start: 2024-08-14 | End: 2024-09-13

## 2024-08-14 RX ORDER — FLUTICASONE PROPIONATE 50 MCG
2 SPRAY, SUSPENSION (ML) NASAL DAILY
Qty: 16 G | Refills: 0 | Status: SHIPPED | OUTPATIENT
Start: 2024-08-14

## 2024-08-14 ASSESSMENT — ENCOUNTER SYMPTOMS
RESPIRATORY NEGATIVE: 1
GASTROINTESTINAL NEGATIVE: 1

## 2024-08-14 NOTE — PROGRESS NOTES
[facility]  [address]  [address]  [place] [address]  Dept: [phone]  Dept Fax: [phone]  Loc: [phone]      Subjective:     Chief Complaint   Patient presents with    Annual Exam       HPI:  Wendi Pulido is a 34 y.o. female presents today for her  annual physical and routine preventative visit.   PMHx reviewed. No significant change. Family and social history also reviewed. No recent ER or UC visit. No recent hospitalization.   Chronic medications reviewed, updated and reconciled. She states she take sthe muscle relaxer and Gabapentin prn now  Pt does not smoke, drink ETOH or use recreational drugs.  Recent labs results reviewed with her today. All wnl   Overall, she feels well and healthy.       ROS:   Review of Systems   Constitutional: Negative.    HENT: Negative.     Respiratory: Negative.     Cardiovascular: Negative.    Gastrointestinal: Negative.    Endocrine: Negative.    Genitourinary: Negative.    Musculoskeletal: Negative.    Neurological: Negative.    Psychiatric/Behavioral:  Positive for decreased concentration (stable on presnet dose of Adderall). Negative for dysphoric mood. The patient is not nervous/anxious.         Anxiety and depression - improved on current  dose of Prozac       PMHx:  Past Medical History:   Diagnosis Date    Anxiety and depression 1/20/2019    Anxiety and depression     GERD (gastroesophageal reflux disease)      Patient Active Problem List   Diagnosis    Anxiety and depression    Adult ADHD (attention deficit hyperactivity disorder)    Varicose veins of legs    Chronic gastroesophageal reflux disease    Displacement of cervical intervertebral disc       PSHx:  Past Surgical History:   Procedure Laterality Date    CHOLECYSTECTOMY      UPPER GASTROINTESTINAL ENDOSCOPY N/A 05/04/2021    Dr Francois, w 54 Fr Dil, partially obstructing lower esoph ring, (-)H. pylori, (+) GERD wo EOE, sm 1-2mm erosions sugg

## 2024-10-09 DIAGNOSIS — F90.9 ADULT ADHD (ATTENTION DEFICIT HYPERACTIVITY DISORDER): ICD-10-CM

## 2024-10-09 DIAGNOSIS — Z76.0 MEDICATION REFILL: ICD-10-CM

## 2024-10-09 RX ORDER — DEXTROAMPHETAMINE SACCHARATE, AMPHETAMINE ASPARTATE, DEXTROAMPHETAMINE SULFATE AND AMPHETAMINE SULFATE 2.5; 2.5; 2.5; 2.5 MG/1; MG/1; MG/1; MG/1
10 TABLET ORAL DAILY
Qty: 30 TABLET | Refills: 0 | Status: SHIPPED | OUTPATIENT
Start: 2024-10-09 | End: 2024-11-08

## 2024-10-09 RX ORDER — DEXTROAMPHETAMINE SACCHARATE, AMPHETAMINE ASPARTATE MONOHYDRATE, DEXTROAMPHETAMINE SULFATE AND AMPHETAMINE SULFATE 7.5; 7.5; 7.5; 7.5 MG/1; MG/1; MG/1; MG/1
30 CAPSULE, EXTENDED RELEASE ORAL DAILY
Qty: 30 CAPSULE | Refills: 0 | Status: SHIPPED | OUTPATIENT
Start: 2024-10-09 | End: 2024-11-08

## 2024-10-09 NOTE — TELEPHONE ENCOUNTER
Wendi JO Ashok called to request a refill on her medication.      Last office visit : 8/14/2024   Next office visit : 11/15/2024     Last UDS:   Benzodiazepine Screen, Urine   Date Value Ref Range Status   01/03/2024 neg  Final     Buprenorphine Urine   Date Value Ref Range Status   01/03/2024 neg  Final     Cocaine Metabolite Screen, Urine   Date Value Ref Range Status   01/03/2024 neg  Final     Gabapentin Screen, Urine   Date Value Ref Range Status   01/03/2024 neg  Final     Oxycodone Screen, Ur   Date Value Ref Range Status   01/03/2024 neg  Final     Propoxyphene Screen, Urine   Date Value Ref Range Status   01/03/2024 neg  Final     THC Screen, Urine   Date Value Ref Range Status   01/03/2024 neg  Final     Tricyclic Antidepressants, Urine   Date Value Ref Range Status   01/03/2024 neg  Final       Last Pedro Pablo: 10-  Medication Contract: 01-  Last Fill: 08-    Requested Prescriptions     Pending Prescriptions Disp Refills    amphetamine-dextroamphetamine (ADDERALL XR) 30 MG extended release capsule 30 capsule 0     Sig: Take 1 capsule by mouth daily for 30 days. Max Daily Amount: 30 mg    amphetamine-dextroamphetamine (ADDERALL, 10MG,) 10 MG tablet 30 tablet 0     Sig: Take 1 tablet by mouth daily for 30 days. Max Daily Amount: 10 mg         Please approve or refuse this medication.   Yuni Traore MA

## 2024-10-29 DIAGNOSIS — Z76.0 MEDICATION REFILL: ICD-10-CM

## 2024-10-29 DIAGNOSIS — F90.9 ADULT ADHD (ATTENTION DEFICIT HYPERACTIVITY DISORDER): ICD-10-CM

## 2024-10-29 RX ORDER — DEXTROAMPHETAMINE SACCHARATE, AMPHETAMINE ASPARTATE, DEXTROAMPHETAMINE SULFATE AND AMPHETAMINE SULFATE 2.5; 2.5; 2.5; 2.5 MG/1; MG/1; MG/1; MG/1
10 TABLET ORAL DAILY
Qty: 30 TABLET | Refills: 0 | Status: SHIPPED | OUTPATIENT
Start: 2024-11-08 | End: 2024-12-08

## 2024-10-29 RX ORDER — DEXTROAMPHETAMINE SACCHARATE, AMPHETAMINE ASPARTATE MONOHYDRATE, DEXTROAMPHETAMINE SULFATE AND AMPHETAMINE SULFATE 7.5; 7.5; 7.5; 7.5 MG/1; MG/1; MG/1; MG/1
30 CAPSULE, EXTENDED RELEASE ORAL DAILY
Qty: 30 CAPSULE | Refills: 0 | Status: SHIPPED | OUTPATIENT
Start: 2024-11-08 | End: 2024-12-08

## 2024-10-29 NOTE — TELEPHONE ENCOUNTER
Wendi JO Ashok called to request a refill on her medication.      Last office visit : 8/14/2024   Next office visit : 11/25/2024     Last UDS:   Benzodiazepine Screen, Urine   Date Value Ref Range Status   01/03/2024 neg  Final     Buprenorphine Urine   Date Value Ref Range Status   01/03/2024 neg  Final     Cocaine Metabolite Screen, Urine   Date Value Ref Range Status   01/03/2024 neg  Final     Gabapentin Screen, Urine   Date Value Ref Range Status   01/03/2024 neg  Final     Oxycodone Screen, Ur   Date Value Ref Range Status   01/03/2024 neg  Final     Propoxyphene Screen, Urine   Date Value Ref Range Status   01/03/2024 neg  Final     THC Screen, Urine   Date Value Ref Range Status   01/03/2024 neg  Final     Tricyclic Antidepressants, Urine   Date Value Ref Range Status   01/03/2024 neg  Final       Last Pedro Pablo: 10-  Medication Contract: 01-  Last Fill: 10/09/2024    Requested Prescriptions     Pending Prescriptions Disp Refills    amphetamine-dextroamphetamine (ADDERALL XR) 30 MG extended release capsule 30 capsule 0     Sig: Take 1 capsule by mouth daily for 30 days. Max Daily Amount: 30 mg    amphetamine-dextroamphetamine (ADDERALL, 10MG,) 10 MG tablet 30 tablet 0     Sig: Take 1 tablet by mouth daily for 30 days. Max Daily Amount: 10 mg         Please approve or refuse this medication.   Yuni Traore MA

## 2024-11-14 DIAGNOSIS — Z76.0 MEDICATION REFILL: ICD-10-CM

## 2024-11-14 DIAGNOSIS — F90.9 ADULT ADHD (ATTENTION DEFICIT HYPERACTIVITY DISORDER): ICD-10-CM

## 2024-11-14 DIAGNOSIS — F40.10 SOCIAL ANXIETY DISORDER: ICD-10-CM

## 2024-11-14 RX ORDER — CLONIDINE HYDROCHLORIDE 0.1 MG/1
TABLET ORAL
Qty: 90 TABLET | Refills: 0 | Status: SHIPPED | OUTPATIENT
Start: 2024-11-14

## 2024-11-14 RX ORDER — DEXTROAMPHETAMINE SACCHARATE, AMPHETAMINE ASPARTATE MONOHYDRATE, DEXTROAMPHETAMINE SULFATE AND AMPHETAMINE SULFATE 7.5; 7.5; 7.5; 7.5 MG/1; MG/1; MG/1; MG/1
30 CAPSULE, EXTENDED RELEASE ORAL DAILY
Qty: 30 CAPSULE | Refills: 0 | Status: CANCELLED | OUTPATIENT
Start: 2024-11-14 | End: 2024-12-14

## 2024-11-14 RX ORDER — FLUOXETINE 40 MG/1
40 CAPSULE ORAL DAILY
Qty: 90 CAPSULE | Refills: 0 | Status: SHIPPED | OUTPATIENT
Start: 2024-11-14 | End: 2025-02-12

## 2024-11-14 NOTE — TELEPHONE ENCOUNTER
Wendi Pulido called to request a refill on her medication.      Last office visit : 8/14/2024   Next office visit : 11/19/2024     Requested Prescriptions     Pending Prescriptions Disp Refills    cloNIDine (CATAPRES) 0.1 MG tablet 90 tablet 0     Sig: TAKE 1 TABLET BY MOUTH EVERY NIGHT AS NEEDED FOR INSOMNIA    FLUoxetine (PROZAC) 40 MG capsule 90 capsule 0     Sig: Take 1 capsule by mouth daily            Yuni Traore MA

## 2024-12-23 ENCOUNTER — OFFICE VISIT (OUTPATIENT)
Dept: PRIMARY CARE CLINIC | Age: 34
End: 2024-12-23
Payer: COMMERCIAL

## 2024-12-23 VITALS
HEART RATE: 93 BPM | TEMPERATURE: 97.8 F | BODY MASS INDEX: 31.83 KG/M2 | WEIGHT: 210 LBS | HEIGHT: 68 IN | DIASTOLIC BLOOD PRESSURE: 74 MMHG | OXYGEN SATURATION: 99 % | SYSTOLIC BLOOD PRESSURE: 126 MMHG

## 2024-12-23 DIAGNOSIS — R79.89 LOW SERUM TOTAL PROTEIN LEVEL: ICD-10-CM

## 2024-12-23 DIAGNOSIS — F90.9 ADULT ADHD (ATTENTION DEFICIT HYPERACTIVITY DISORDER): Primary | ICD-10-CM

## 2024-12-23 DIAGNOSIS — K59.00 CONSTIPATION, UNSPECIFIED CONSTIPATION TYPE: ICD-10-CM

## 2024-12-23 LAB
ALBUMIN SERPL-MCNC: 4.5 G/DL (ref 3.5–5.2)
ALP SERPL-CCNC: 75 U/L (ref 35–104)
ALT SERPL-CCNC: 11 U/L (ref 5–33)
ANION GAP SERPL CALCULATED.3IONS-SCNC: 7 MMOL/L (ref 7–19)
AST SERPL-CCNC: 15 U/L (ref 5–32)
BILIRUB SERPL-MCNC: 0.2 MG/DL (ref 0.2–1.2)
BUN SERPL-MCNC: 12 MG/DL (ref 6–20)
CALCIUM SERPL-MCNC: 9.3 MG/DL (ref 8.6–10)
CHLORIDE SERPL-SCNC: 103 MMOL/L (ref 98–111)
CO2 SERPL-SCNC: 30 MMOL/L (ref 22–29)
CREAT SERPL-MCNC: 0.5 MG/DL (ref 0.5–0.9)
GLUCOSE SERPL-MCNC: 85 MG/DL (ref 70–99)
POTASSIUM SERPL-SCNC: 4.5 MMOL/L (ref 3.5–5)
PROT SERPL-MCNC: 7.2 G/DL (ref 6.4–8.3)
SODIUM SERPL-SCNC: 140 MMOL/L (ref 136–145)

## 2024-12-23 PROCEDURE — 99213 OFFICE O/P EST LOW 20 MIN: CPT | Performed by: FAMILY MEDICINE

## 2024-12-23 RX ORDER — DEXTROAMPHETAMINE SACCHARATE, AMPHETAMINE ASPARTATE MONOHYDRATE, DEXTROAMPHETAMINE SULFATE AND AMPHETAMINE SULFATE 7.5; 7.5; 7.5; 7.5 MG/1; MG/1; MG/1; MG/1
30 CAPSULE, EXTENDED RELEASE ORAL DAILY
Qty: 30 CAPSULE | Refills: 0 | Status: CANCELLED | OUTPATIENT
Start: 2024-12-23 | End: 2025-01-22

## 2024-12-23 ASSESSMENT — ENCOUNTER SYMPTOMS
GASTROINTESTINAL NEGATIVE: 1
RESPIRATORY NEGATIVE: 1

## 2024-12-23 NOTE — PROGRESS NOTES
[facility]  [address]  [address]  [place] [address]  Dept: [phone]  Dept Fax: [phone]  Loc: [phone]      Subjective:     Chief Complaint   Patient presents with    Irritable Bowel Syndrome    Abnormal Lab     Patient had elevated protein        HPI:  Wendi Pulido is a 34 y.o. female presents today for follow up, med check  and refills. She also needs a repeat blood work to check her protein level. She wants to donate blood to make extra money but her last blood work shows her protein level was low. She needs a form signed to allow him to continue to donate blood/plasma  She states she is having IBS symptoms - mostly constipated    ROS:   Review of Systems   Constitutional: Negative.    HENT: Negative.     Respiratory: Negative.     Cardiovascular: Negative.    Gastrointestinal: Negative.    Endocrine: Negative.    Genitourinary: Negative.    Musculoskeletal: Negative.    Neurological: Negative.    Psychiatric/Behavioral:  Positive for decreased concentration. Negative for dysphoric mood. The patient is not nervous/anxious.         Anxiety and depression - improved on current  dose of Prozac       PMHx:  Past Medical History:   Diagnosis Date    Anxiety and depression 1/20/2019    Anxiety and depression     GERD (gastroesophageal reflux disease)      Patient Active Problem List   Diagnosis    Anxiety and depression    Adult ADHD (attention deficit hyperactivity disorder)    Varicose veins of legs    Chronic gastroesophageal reflux disease    Displacement of cervical intervertebral disc       PSHx:  Past Surgical History:   Procedure Laterality Date    CHOLECYSTECTOMY      UPPER GASTROINTESTINAL ENDOSCOPY N/A 05/04/2021    maribell Gaming 54 Fr Dil, partially obstructing lower esoph ring, (-)H. pylori, (+) GERD wo EOE, sm 1-2mm erosions sugg of chemical gastritis,    UPPER GASTROINTESTINAL ENDOSCOPY  05/04/2021    [employer]

## 2025-01-08 ENCOUNTER — NURSE ONLY (OUTPATIENT)
Dept: PRIMARY CARE CLINIC | Age: 35
End: 2025-01-08
Payer: COMMERCIAL

## 2025-01-08 DIAGNOSIS — Z79.899 MEDICATION MANAGEMENT: Primary | ICD-10-CM

## 2025-01-08 PROCEDURE — 80305 DRUG TEST PRSMV DIR OPT OBS: CPT | Performed by: FAMILY MEDICINE

## 2025-01-09 LAB
ALCOHOL URINE: NEGATIVE
AMPHETAMINE SCREEN URINE: NORMAL
BARBITURATE SCREEN URINE: NEGATIVE
BENZODIAZEPINE SCREEN, URINE: NEGATIVE
BUPRENORPHINE URINE: NEGATIVE
COCAINE METABOLITE SCREEN URINE: NEGATIVE
FENTANYL SCREEN, URINE: NEGATIVE
GABAPENTIN SCREEN, URINE: NORMAL
MDMA, URINE: NEGATIVE
METHADONE SCREEN, URINE: NEGATIVE
METHAMPHETAMINE, URINE: NEGATIVE
OPIATE SCREEN URINE: NEGATIVE
OXYCODONE SCREEN URINE: NEGATIVE
PHENCYCLIDINE SCREEN URINE: NEGATIVE
PROPOXYPHENE SCREEN, URINE: NEGATIVE
SYNTHETIC CANNABINOIDS(K2) SCREEN, URINE: NEGATIVE
THC SCREEN, URINE: NORMAL
TRAMADOL SCREEN URINE: NEGATIVE
TRICYCLIC ANTIDEPRESSANTS, UR: NEGATIVE

## 2025-01-16 ENCOUNTER — OFFICE VISIT (OUTPATIENT)
Age: 35
End: 2025-01-16
Payer: COMMERCIAL

## 2025-01-16 VITALS
TEMPERATURE: 97.8 F | SYSTOLIC BLOOD PRESSURE: 124 MMHG | HEIGHT: 68 IN | HEART RATE: 91 BPM | OXYGEN SATURATION: 100 % | RESPIRATION RATE: 20 BRPM | BODY MASS INDEX: 31.98 KG/M2 | DIASTOLIC BLOOD PRESSURE: 62 MMHG | WEIGHT: 211 LBS

## 2025-01-16 DIAGNOSIS — J02.9 SORE THROAT: ICD-10-CM

## 2025-01-16 DIAGNOSIS — J01.90 ACUTE RHINOSINUSITIS: Primary | ICD-10-CM

## 2025-01-16 DIAGNOSIS — G43.909 MIGRAINE WITHOUT STATUS MIGRAINOSUS, NOT INTRACTABLE, UNSPECIFIED MIGRAINE TYPE: ICD-10-CM

## 2025-01-16 DIAGNOSIS — H65.93 MIDDLE EAR EFFUSION, BILATERAL: ICD-10-CM

## 2025-01-16 LAB — S PYO AG THROAT QL: NORMAL

## 2025-01-16 PROCEDURE — 96372 THER/PROPH/DIAG INJ SC/IM: CPT

## 2025-01-16 PROCEDURE — 99213 OFFICE O/P EST LOW 20 MIN: CPT

## 2025-01-16 PROCEDURE — 87880 STREP A ASSAY W/OPTIC: CPT

## 2025-01-16 RX ORDER — FLUTICASONE PROPIONATE 50 MCG
2 SPRAY, SUSPENSION (ML) NASAL DAILY
Qty: 48 G | Refills: 1 | Status: SHIPPED | OUTPATIENT
Start: 2025-01-16

## 2025-01-16 RX ORDER — LORATADINE PSEUDOEPHEDRINE SULFATE 10; 240 MG/1; MG/1
1 TABLET, EXTENDED RELEASE ORAL DAILY
Qty: 10 TABLET | Refills: 0 | Status: SHIPPED | OUTPATIENT
Start: 2025-01-16 | End: 2025-01-26

## 2025-01-16 RX ORDER — KETOROLAC TROMETHAMINE 30 MG/ML
30 INJECTION, SOLUTION INTRAMUSCULAR; INTRAVENOUS ONCE
Status: COMPLETED | OUTPATIENT
Start: 2025-01-16 | End: 2025-01-16

## 2025-01-16 RX ADMIN — KETOROLAC TROMETHAMINE 30 MG: 30 INJECTION, SOLUTION INTRAMUSCULAR; INTRAVENOUS at 12:34

## 2025-01-16 ASSESSMENT — ENCOUNTER SYMPTOMS
CHEST TIGHTNESS: 0
EYE ITCHING: 0
ABDOMINAL DISTENTION: 0
COUGH: 0
WHEEZING: 0
SINUS PRESSURE: 0
SINUS PAIN: 0
COLOR CHANGE: 0
EYE PAIN: 0
SORE THROAT: 1
APNEA: 0
SHORTNESS OF BREATH: 0
TROUBLE SWALLOWING: 0
CONSTIPATION: 0
DIARRHEA: 0
NAUSEA: 0
RHINORRHEA: 0
EYE DISCHARGE: 0
VOMITING: 0
ABDOMINAL PAIN: 0

## 2025-01-16 NOTE — PROGRESS NOTES
Medication was administered by Iona Tamayo at 12:35 PM.    Medication: ketorolac tromethamine(toradol) 30mg   Amount: 1mL  Route: intramuscular  Site: Dorsogluteal right    Patient tolerated well.    
Normal rate and regular rhythm.      Pulses: Normal pulses.      Heart sounds: Normal heart sounds, S1 normal and S2 normal. No murmur heard.  Pulmonary:      Effort: Pulmonary effort is normal. No accessory muscle usage or respiratory distress.      Breath sounds: Normal breath sounds and air entry. No stridor. No decreased breath sounds, wheezing or rhonchi.   Abdominal:      General: Abdomen is flat. Bowel sounds are normal. There is no distension.      Palpations: Abdomen is soft.      Tenderness: There is no abdominal tenderness. There is no guarding.   Musculoskeletal:         General: Normal range of motion.      Cervical back: Full passive range of motion without pain, normal range of motion and neck supple. No rigidity.      Right lower leg: No edema.      Left lower leg: No edema.   Lymphadenopathy:      Cervical: No cervical adenopathy.   Skin:     General: Skin is warm and dry.      Coloration: Skin is not cyanotic or pale.      Findings: No rash.   Neurological:      General: No focal deficit present.      Mental Status: She is alert and oriented to person, place, and time.      Sensory: Sensation is intact.      Motor: Motor function is intact.      Coordination: Coordination is intact.      Gait: Gait is intact.   Psychiatric:         Attention and Perception: Attention normal.         Mood and Affect: Mood and affect normal.         Speech: Speech normal.         Behavior: Behavior normal. Behavior is cooperative.       /62   Pulse 91   Temp 97.8 °F (36.6 °C) (Temporal)   Resp 20   Ht 1.727 m (5' 8\")   Wt 95.7 kg (211 lb)   SpO2 100%   BMI 32.08 kg/m²     :Assessment   Assessment & Plan    Diagnosis Orders   1. Acute rhinosinusitis  loratadine-pseudoephedrine (CLARITIN-D 24 HOUR)  MG per extended release tablet    fluticasone (FLONASE) 50 MCG/ACT nasal spray      2. Migraine without status migrainosus, not intractable, unspecified migraine type  ketorolac (TORADOL) injection 30 mg

## 2025-01-16 NOTE — PATIENT INSTRUCTIONS
- Negative Strep test.  - Toradol shot given in office to help with migraine.   - Claritin D sent to pharmacy; take as prescribed.  - Flonase sent to pharmacy; use daily to assist with symptoms.   - Medications such as Zyrtec or Claritin may help with symptoms.   - Nasal decongestant, such as Flonase/Afrin as needed.  - OTC cough medicine like Delsym/Robitussin if applicable.  - Tylenol/Motrin as needed for body aches/fevers unless contraindicated.  - Multivitamin is recommended to help boost the immune system.  - Drink plenty of water to stay hydrated.  - May use humidifier as needed while sleeping.  - Allow adequate rest.  - Encourage deep breathing exercises.  - Recommended staying home until fever free for at least 24 hours without medication and symptoms are improving.  - Work note provided for today.   - Return to the clinic or follow up with PCP if symptoms worsen or fail to improve.

## 2025-01-27 ENCOUNTER — OFFICE VISIT (OUTPATIENT)
Age: 35
End: 2025-01-27
Payer: COMMERCIAL

## 2025-01-27 VITALS
BODY MASS INDEX: 31.78 KG/M2 | RESPIRATION RATE: 20 BRPM | WEIGHT: 209 LBS | OXYGEN SATURATION: 100 % | HEART RATE: 90 BPM | SYSTOLIC BLOOD PRESSURE: 118 MMHG | TEMPERATURE: 98.4 F | DIASTOLIC BLOOD PRESSURE: 72 MMHG

## 2025-01-27 DIAGNOSIS — J02.9 PHARYNGITIS, UNSPECIFIED ETIOLOGY: ICD-10-CM

## 2025-01-27 DIAGNOSIS — U07.1 COVID-19: Primary | ICD-10-CM

## 2025-01-27 DIAGNOSIS — J02.9 SORE THROAT: ICD-10-CM

## 2025-01-27 DIAGNOSIS — R05.1 ACUTE COUGH: ICD-10-CM

## 2025-01-27 DIAGNOSIS — R09.81 SINUS CONGESTION: ICD-10-CM

## 2025-01-27 DIAGNOSIS — R50.9 FEVER, UNSPECIFIED FEVER CAUSE: ICD-10-CM

## 2025-01-27 LAB
INFLUENZA A ANTIBODY: NORMAL
INFLUENZA B ANTIBODY: NORMAL
Lab: ABNORMAL
QC PASS/FAIL: ABNORMAL
S PYO AG THROAT QL: NORMAL
SARS-COV-2, POC: DETECTED

## 2025-01-27 PROCEDURE — 87811 SARS-COV-2 COVID19 W/OPTIC: CPT

## 2025-01-27 PROCEDURE — 87804 INFLUENZA ASSAY W/OPTIC: CPT

## 2025-01-27 PROCEDURE — 99213 OFFICE O/P EST LOW 20 MIN: CPT

## 2025-01-27 PROCEDURE — 87880 STREP A ASSAY W/OPTIC: CPT

## 2025-01-27 RX ORDER — FLUCONAZOLE 150 MG/1
150 TABLET ORAL
Qty: 3 TABLET | Refills: 0 | Status: SHIPPED | OUTPATIENT
Start: 2025-01-27 | End: 2025-02-05

## 2025-01-27 RX ORDER — BROMPHENIRAMINE MALEATE, PSEUDOEPHEDRINE HYDROCHLORIDE, AND DEXTROMETHORPHAN HYDROBROMIDE 2; 30; 10 MG/5ML; MG/5ML; MG/5ML
5-10 SYRUP ORAL 4 TIMES DAILY PRN
Qty: 200 ML | Refills: 0 | Status: SHIPPED | OUTPATIENT
Start: 2025-01-27 | End: 2025-02-01

## 2025-01-27 ASSESSMENT — ENCOUNTER SYMPTOMS
CHOKING: 0
EYE REDNESS: 0
ABDOMINAL PAIN: 0
COUGH: 1
SHORTNESS OF BREATH: 0
TROUBLE SWALLOWING: 0
EYE DISCHARGE: 0
WHEEZING: 0
FACIAL SWELLING: 0
EYE PAIN: 0
SINUS PRESSURE: 1
COLOR CHANGE: 0
VOMITING: 0
STRIDOR: 0
CONSTIPATION: 0
SORE THROAT: 1
CHEST TIGHTNESS: 0
SINUS PAIN: 0
APNEA: 0
NAUSEA: 0
ABDOMINAL DISTENTION: 0
DIARRHEA: 0

## 2025-01-27 NOTE — PROGRESS NOTES
[facility]  [facility]  [address] [place] [address] [place] [address]  Dept: [phone]  Dept Fax: [phone]  Loc: [phone]    Wendi Pulido is a 34 y.o. female who presents today for her medical conditions/complaints as noted below.  Wendi Pulido is complaining of Congestion, Fever, Generalized Body Aches, and Cough (X 3 days)        HPI:   Wendi Pulido presents to the clinic today with complaints of sinus congestion, sinus drainage, cough, sore throat, body aches, and fever.  Symptoms started on Friday.  Admits exposure to sick contacts.  No alleviating factors are reported for this.  Symptoms are moderate.  Patient stable.    Of note, patient was seen here on 1/16/2025 by MICAELA Sotelo and was diagnosed with acute rhinosinusitis; was prescribed Claritin-D and Flonase.        Past Medical History:   Diagnosis Date    Anxiety and depression 1/20/2019    Anxiety and depression     GERD (gastroesophageal reflux disease)        Past Surgical History:   Procedure Laterality Date    CHOLECYSTECTOMY      UPPER GASTROINTESTINAL ENDOSCOPY N/A 05/04/2021    maribell Gaming 54 Fr Dil, partially obstructing lower esoph ring, (-)H. pylori, (+) GERD wo EOE, sm 1-2mm erosions sugg of chemical gastritis,    UPPER GASTROINTESTINAL ENDOSCOPY  05/04/2021    [employer]edy Gaming weighted Bougie dilator-54 Fr    WISDOM TOOTH EXTRACTION         Family History   Problem Relation Age of Onset    Diabetes Maternal Grandmother     Cancer Paternal Grandfather        Social History     Tobacco Use    Smoking status: Never    Smokeless tobacco: Never   Substance Use Topics    Alcohol use: Yes     Comment: occ        Current Outpatient Medications   Medication Sig Dispense Refill    amoxicillin-clavulanate (AUGMENTIN) 875-125 MG per tablet Take 1 tablet by mouth 2 times daily for 7 days 14 tablet 0    brompheniramine-pseudoephedrine-DM 2-30-10 MG/5ML syrup Take 5-10

## 2025-01-27 NOTE — PATIENT INSTRUCTIONS
COVID test is positive.    Strep and flu test are negative.    Augmentin prescribed for treatment of pharyngitis.  Take full course of antibiotics.    Diflucan prescribed.  Take as directed.    Bromfed prescribed as needed for cough.      COVID  Recommend starting a multivitamin and zinc supplements, tylenol/ibuprofen as needed for body aches/chills/fever, mucinex otc, zyrtec and flonase otc.     Encourage rest and fluids; monitor for signs/symptoms of dehydration.    Quarantine until symptoms improving and fever free for 24 hours without tylenol/motrin    Follow up with PCP as needed.    Report to ER if symptoms become severe, such as shortness of breath or difficulty breathing.       Pharyngitis  Take full course of antibiotics    Monitor for fever and treat as needed with tylenol or ibuprofen    Recommended throat lozenges as needed and salt water gargles three times daily    Replace toothbrush in 24-48 hours after antibiotics are started    The patient is to follow up with PCP or return to clinic if symptoms worsen/fail to improve.      Note provided.  May return on Friday.    Any condition can change, despite proper treatment. Therefore, if symptoms still persist or worsen after treatment plan intitated today, either go to the nearest ER, or call PCP, or return to  for further evaluation.    Urgent Care evaluation today is not a substitute for PCP visit. Follow up care is your responsibility to discuss and review this UC visit.

## 2025-02-12 DIAGNOSIS — Z76.0 MEDICATION REFILL: ICD-10-CM

## 2025-02-12 NOTE — TELEPHONE ENCOUNTER
Wendi Pulido called to request a refill on her medication.      Last office visit : 12/23/2024   Next office visit : Visit date not found     Requested Prescriptions     Pending Prescriptions Disp Refills    cloNIDine (CATAPRES) 0.1 MG tablet 90 tablet 0     Sig: TAKE 1 TABLET BY MOUTH EVERY NIGHT AS NEEDED FOR INSOMNIA            Yuni Traore MA

## 2025-02-13 RX ORDER — CLONIDINE HYDROCHLORIDE 0.1 MG/1
TABLET ORAL
Qty: 90 TABLET | Refills: 0 | Status: SHIPPED | OUTPATIENT
Start: 2025-02-13

## 2025-02-26 ENCOUNTER — NURSE ONLY (OUTPATIENT)
Dept: PRIMARY CARE CLINIC | Age: 35
End: 2025-02-26
Payer: COMMERCIAL

## 2025-02-26 DIAGNOSIS — Z76.0 MEDICATION REFILL: ICD-10-CM

## 2025-02-26 DIAGNOSIS — Z79.899 MEDICATION MANAGEMENT: Primary | ICD-10-CM

## 2025-02-26 DIAGNOSIS — F40.10 SOCIAL ANXIETY DISORDER: ICD-10-CM

## 2025-02-26 DIAGNOSIS — F90.9 ADULT ADHD (ATTENTION DEFICIT HYPERACTIVITY DISORDER): ICD-10-CM

## 2025-02-26 PROCEDURE — 80305 DRUG TEST PRSMV DIR OPT OBS: CPT | Performed by: FAMILY MEDICINE

## 2025-02-27 RX ORDER — FLUOXETINE HYDROCHLORIDE 40 MG/1
40 CAPSULE ORAL DAILY
Qty: 90 CAPSULE | Refills: 0 | Status: SHIPPED | OUTPATIENT
Start: 2025-02-27 | End: 2025-05-28

## 2025-02-28 ENCOUNTER — PATIENT MESSAGE (OUTPATIENT)
Dept: PRIMARY CARE CLINIC | Age: 35
End: 2025-02-28

## 2025-02-28 DIAGNOSIS — F90.9 ADULT ADHD (ATTENTION DEFICIT HYPERACTIVITY DISORDER): ICD-10-CM

## 2025-02-28 DIAGNOSIS — Z76.0 MEDICATION REFILL: ICD-10-CM

## 2025-03-03 RX ORDER — DEXTROAMPHETAMINE SACCHARATE, AMPHETAMINE ASPARTATE MONOHYDRATE, DEXTROAMPHETAMINE SULFATE AND AMPHETAMINE SULFATE 7.5; 7.5; 7.5; 7.5 MG/1; MG/1; MG/1; MG/1
30 CAPSULE, EXTENDED RELEASE ORAL DAILY
Qty: 30 CAPSULE | Refills: 0 | Status: SHIPPED | OUTPATIENT
Start: 2025-03-03 | End: 2025-04-02

## 2025-03-03 RX ORDER — DEXTROAMPHETAMINE SACCHARATE, AMPHETAMINE ASPARTATE, DEXTROAMPHETAMINE SULFATE AND AMPHETAMINE SULFATE 2.5; 2.5; 2.5; 2.5 MG/1; MG/1; MG/1; MG/1
10 TABLET ORAL DAILY
Qty: 30 TABLET | Refills: 0 | Status: SHIPPED | OUTPATIENT
Start: 2025-03-03 | End: 2025-04-02

## 2025-03-03 NOTE — TELEPHONE ENCOUNTER
Wendi DEYSI Pulido called to request a refill on her medication.      Last office visit : 12/23/2024   Next office visit : Visit date not found     Last UDS:   Benzodiazepine Screen, Urine   Date Value Ref Range Status   02/26/2025 Negative  Final     Buprenorphine Urine   Date Value Ref Range Status   02/26/2025 Negative  Final     Cocaine Metabolite Screen, Urine   Date Value Ref Range Status   02/26/2025 Negative  Final     Gabapentin Screen, Urine   Date Value Ref Range Status   02/26/2025 Negative  Final     Oxycodone Screen, Ur   Date Value Ref Range Status   02/26/2025 Negative  Final     Propoxyphene Screen, Urine   Date Value Ref Range Status   02/26/2025 Negative  Final     THC Screen, Urine   Date Value Ref Range Status   02/26/2025 Negative  Final     Tricyclic Antidepressants, Urine   Date Value Ref Range Status   02/26/2025 Negative  Final       Last Pedro Pablo: 01/08/2025  Medication Contract: 01/09/2025  Last Fill: 11-    Patient tested positive for Mariajuana in December, she came back and completed another UDS and come back positive for amphetamine, where she had been taken the extra doses from her child's prescription.      Requested Prescriptions     Pending Prescriptions Disp Refills    amphetamine-dextroamphetamine (ADDERALL XR) 30 MG extended release capsule 30 capsule 0     Sig: Take 1 capsule by mouth daily for 30 days. Max Daily Amount: 30 mg    amphetamine-dextroamphetamine (ADDERALL, 10MG,) 10 MG tablet 30 tablet 0     Sig: Take 1 tablet by mouth daily for 30 days. Max Daily Amount: 10 mg         Please approve or refuse this medication.   Yuni Traore MA

## 2025-03-17 ENCOUNTER — RESULTS FOLLOW-UP (OUTPATIENT)
Dept: GENERAL RADIOLOGY | Age: 35
End: 2025-03-17

## 2025-03-17 ENCOUNTER — HOSPITAL ENCOUNTER (OUTPATIENT)
Dept: GENERAL RADIOLOGY | Age: 35
Discharge: HOME | End: 2025-03-17
Payer: COMMERCIAL

## 2025-03-17 ENCOUNTER — PATIENT MESSAGE (OUTPATIENT)
Dept: PRIMARY CARE CLINIC | Age: 35
End: 2025-03-17

## 2025-03-17 ENCOUNTER — OFFICE VISIT (OUTPATIENT)
Age: 35
End: 2025-03-17
Payer: COMMERCIAL

## 2025-03-17 VITALS
SYSTOLIC BLOOD PRESSURE: 128 MMHG | BODY MASS INDEX: 31.93 KG/M2 | RESPIRATION RATE: 20 BRPM | OXYGEN SATURATION: 100 % | WEIGHT: 210 LBS | HEART RATE: 81 BPM | DIASTOLIC BLOOD PRESSURE: 76 MMHG | TEMPERATURE: 97.6 F

## 2025-03-17 DIAGNOSIS — K59.00 CONSTIPATION, UNSPECIFIED CONSTIPATION TYPE: ICD-10-CM

## 2025-03-17 DIAGNOSIS — R14.0 BLOATING: ICD-10-CM

## 2025-03-17 DIAGNOSIS — R30.0 DYSURIA: ICD-10-CM

## 2025-03-17 DIAGNOSIS — K59.00 CONSTIPATION, UNSPECIFIED CONSTIPATION TYPE: Primary | ICD-10-CM

## 2025-03-17 LAB
APPEARANCE FLUID: CLEAR
BILIRUBIN, POC: NORMAL
BLOOD URINE, POC: NORMAL
CLARITY, POC: CLEAR
COLOR, POC: NORMAL
GLUCOSE URINE, POC: NORMAL MG/DL
KETONES, POC: NORMAL MG/DL
LEUKOCYTE EST, POC: NORMAL
NITRITE, POC: NORMAL
PH, POC: 6
PROTEIN, POC: NORMAL MG/DL
SPECIFIC GRAVITY, POC: 1.02
UROBILINOGEN, POC: 0.2 MG/DL

## 2025-03-17 PROCEDURE — 99214 OFFICE O/P EST MOD 30 MIN: CPT

## 2025-03-17 PROCEDURE — 74018 RADEX ABDOMEN 1 VIEW: CPT

## 2025-03-17 PROCEDURE — 81002 URINALYSIS NONAUTO W/O SCOPE: CPT

## 2025-03-17 ASSESSMENT — ENCOUNTER SYMPTOMS
CHEST TIGHTNESS: 0
VOMITING: 0
SINUS PRESSURE: 0
EYE DISCHARGE: 0
COLOR CHANGE: 0
ABDOMINAL PAIN: 0
TROUBLE SWALLOWING: 0
DIARRHEA: 0
COUGH: 0
SHORTNESS OF BREATH: 0
ABDOMINAL DISTENTION: 0
RHINORRHEA: 0
EYE ITCHING: 0
SORE THROAT: 0
EYE PAIN: 0
APNEA: 0
CONSTIPATION: 1
WHEEZING: 0
NAUSEA: 0
SINUS PAIN: 0

## 2025-03-17 NOTE — PROGRESS NOTES
fiber including fruits, vegetables, and whole grains.   Increase physical activity and oral fluid intake, especially water.   Urinalysis performed in office was negative. Urine culture pending, we will call with these results and further treatment if necessary.  Follow-up in the ER if nausea, vomiting, flank pain, visible blood in the urine, decreased urine output, severe abdominal pain, or unable to keep down food or fluids occurs.        Electronically signed by MICAELA Aguayo CNP on 3/17/2025 at 12:26 PM      EMR Dragon/translation disclaimer: Much of this encounter note is an electronic transcription/translation of spoken language to printed text. The electronic translation of spoken language may be erroneous, or at times, nonsensical words or phrases may be inadvertently transcribed.  Although I have reviewed the note for such errors, some may still exist.

## 2025-03-17 NOTE — PATIENT INSTRUCTIONS
KUB ordered, we will call with these results.  GI consult placed, they will call with an appointment. Call urgent care if you have not heard from them in 1 week.  Recommend patient take MiraLAX and Dulcolax daily. Continue prune juice if this is helping you have regular bowel movements.  Start Beano or Gas-X daily.   Increase dietary fiber including fruits, vegetables, and whole grains.   Increase physical activity and oral fluid intake, especially water.   Urinalysis performed in office was negative. Urine culture pending, we will call with these results and further treatment if necessary.  Follow-up in the ER if nausea, vomiting, flank pain, visible blood in the urine, decreased urine output, severe abdominal pain, or unable to keep down food or fluids occurs.

## 2025-03-18 ENCOUNTER — RESULTS FOLLOW-UP (OUTPATIENT)
Dept: CT IMAGING | Age: 35
End: 2025-03-18

## 2025-03-18 ENCOUNTER — HOSPITAL ENCOUNTER (OUTPATIENT)
Dept: CT IMAGING | Age: 35
Discharge: HOME | End: 2025-03-18
Payer: COMMERCIAL

## 2025-03-18 ENCOUNTER — OFFICE VISIT (OUTPATIENT)
Dept: PRIMARY CARE CLINIC | Age: 35
End: 2025-03-18
Payer: COMMERCIAL

## 2025-03-18 VITALS
HEIGHT: 68 IN | OXYGEN SATURATION: 99 % | TEMPERATURE: 97.2 F | BODY MASS INDEX: 32.22 KG/M2 | DIASTOLIC BLOOD PRESSURE: 68 MMHG | WEIGHT: 212.6 LBS | HEART RATE: 98 BPM | SYSTOLIC BLOOD PRESSURE: 126 MMHG

## 2025-03-18 DIAGNOSIS — S30.851A METAL FOREIGN BODY IN ABDOMEN: ICD-10-CM

## 2025-03-18 DIAGNOSIS — R14.0 BLOATING: ICD-10-CM

## 2025-03-18 DIAGNOSIS — K59.09 CHRONIC CONSTIPATION: Primary | ICD-10-CM

## 2025-03-18 DIAGNOSIS — R10.84 GENERALIZED ABDOMINAL PAIN: ICD-10-CM

## 2025-03-18 PROCEDURE — 99214 OFFICE O/P EST MOD 30 MIN: CPT | Performed by: FAMILY MEDICINE

## 2025-03-18 PROCEDURE — 74178 CT ABD&PLV WO CNTR FLWD CNTR: CPT

## 2025-03-18 PROCEDURE — 6360000004 HC RX CONTRAST MEDICATION: Performed by: FAMILY MEDICINE

## 2025-03-18 RX ORDER — IOPAMIDOL 755 MG/ML
75 INJECTION, SOLUTION INTRAVASCULAR
Status: COMPLETED | OUTPATIENT
Start: 2025-03-18 | End: 2025-03-18

## 2025-03-18 RX ADMIN — IOPAMIDOL 75 ML: 755 INJECTION, SOLUTION INTRAVENOUS at 14:54

## 2025-03-18 SDOH — ECONOMIC STABILITY: FOOD INSECURITY: WITHIN THE PAST 12 MONTHS, YOU WORRIED THAT YOUR FOOD WOULD RUN OUT BEFORE YOU GOT MONEY TO BUY MORE.: NEVER TRUE

## 2025-03-18 SDOH — ECONOMIC STABILITY: FOOD INSECURITY: WITHIN THE PAST 12 MONTHS, THE FOOD YOU BOUGHT JUST DIDN'T LAST AND YOU DIDN'T HAVE MONEY TO GET MORE.: NEVER TRUE

## 2025-03-18 ASSESSMENT — PATIENT HEALTH QUESTIONNAIRE - PHQ9
SUM OF ALL RESPONSES TO PHQ QUESTIONS 1-9: 0
3. TROUBLE FALLING OR STAYING ASLEEP: NOT AT ALL
SUM OF ALL RESPONSES TO PHQ QUESTIONS 1-9: 0
10. IF YOU CHECKED OFF ANY PROBLEMS, HOW DIFFICULT HAVE THESE PROBLEMS MADE IT FOR YOU TO DO YOUR WORK, TAKE CARE OF THINGS AT HOME, OR GET ALONG WITH OTHER PEOPLE: NOT DIFFICULT AT ALL
1. LITTLE INTEREST OR PLEASURE IN DOING THINGS: NOT AT ALL
SUM OF ALL RESPONSES TO PHQ QUESTIONS 1-9: 0
9. THOUGHTS THAT YOU WOULD BE BETTER OFF DEAD, OR OF HURTING YOURSELF: NOT AT ALL
8. MOVING OR SPEAKING SO SLOWLY THAT OTHER PEOPLE COULD HAVE NOTICED. OR THE OPPOSITE, BEING SO FIGETY OR RESTLESS THAT YOU HAVE BEEN MOVING AROUND A LOT MORE THAN USUAL: NOT AT ALL
5. POOR APPETITE OR OVEREATING: NOT AT ALL
7. TROUBLE CONCENTRATING ON THINGS, SUCH AS READING THE NEWSPAPER OR WATCHING TELEVISION: NOT AT ALL
2. FEELING DOWN, DEPRESSED OR HOPELESS: NOT AT ALL
4. FEELING TIRED OR HAVING LITTLE ENERGY: NOT AT ALL
6. FEELING BAD ABOUT YOURSELF - OR THAT YOU ARE A FAILURE OR HAVE LET YOURSELF OR YOUR FAMILY DOWN: NOT AT ALL
SUM OF ALL RESPONSES TO PHQ QUESTIONS 1-9: 0

## 2025-03-18 ASSESSMENT — ENCOUNTER SYMPTOMS
CONSTIPATION: 1
EYES NEGATIVE: 1
RESPIRATORY NEGATIVE: 1
ABDOMINAL DISTENTION: 1
ABDOMINAL PAIN: 1

## 2025-03-18 NOTE — PROGRESS NOTES
normal.      Left Ear: External ear normal.      Nose: Nose normal.      Mouth/Throat:      Pharynx: Oropharynx is clear.   Eyes:      Conjunctiva/sclera: Conjunctivae normal.   Cardiovascular:      Rate and Rhythm: Normal rate and regular rhythm.      Pulses: Normal pulses.      Heart sounds: Normal heart sounds. No murmur heard.  Pulmonary:      Effort: Pulmonary effort is normal.      Breath sounds: Normal breath sounds.   Abdominal:      Palpations: There is no mass.      Tenderness: There is abdominal tenderness (periumbilical). There is no guarding or rebound.      Comments: Soft, flabby, obese, globular   Neurological:      General: No focal deficit present.      Mental Status: She is alert and oriented to person, place, and time.   Psychiatric:         Behavior: Behavior normal.            Assessment & Plan   1. Chronic constipation  -     Jose Enrique House MD, G[facility], [place]  -     TSH reflex to FT4; Future  2. Bloating  -     Jose Enrique House MD, G[facility]enter[facility], [place]  3. Generalized abdominal pain  -     Jose Enrique House MD, Gastroenter[facility], [place]  4. Metal foreign body in abdomen  -     CT ABDOMEN PELVIS W WO CONTRAST Additional Contrast? Radiologist Recommendation; Future    Sample of Linzess given  #8  Stay well hydrated  Increase fiber in diet  Continue to stay active    Return in about 15 days (around 4/2/2025) for lab review.    All questions were answered.  Medications, including possible adverse effects, and instructions were reviewed and  understanding was confirmed.   Follow-up recommendations, including when to contact or return to office (ie; if symptoms worsen or fail to improve), were discussed and acknowledged.    Electronically signed by Gela Velasco MD on 3/18/25 at 1:25 PM CDT

## 2025-03-19 ENCOUNTER — RESULTS FOLLOW-UP (OUTPATIENT)
Age: 35
End: 2025-03-19

## 2025-03-19 ENCOUNTER — TELEPHONE (OUTPATIENT)
Dept: GASTROENTEROLOGY | Age: 35
End: 2025-03-19

## 2025-03-19 ENCOUNTER — TELEPHONE (OUTPATIENT)
Age: 35
End: 2025-03-19

## 2025-03-19 LAB — BACTERIA UR CULT: NORMAL

## 2025-03-19 NOTE — TELEPHONE ENCOUNTER
Wendi requests that a nurse return their call. Pt's phone disconnected before stating reason for call. Per pt first call dropped while being transferred to nurse. The best time to reach her is Anytime.     Thank you.

## 2025-03-19 NOTE — TELEPHONE ENCOUNTER
Patient called office concerned about the metal piece that was found in her stomach. She was seen in the Urgent Care on 3/17/25 and then she followed up with Dr. Velasco on 3/18/25. When she followed up Dr. Lozano a CT was ordered. Patient called to ask for further treatment. I advised her that since she saw her PCP yesterday and they told her they were not worried about the metal piece, she needs to follow up with them for further treatment if she is concerned. Instructed that I would happily see her again in the urgent care if needed.

## 2025-03-19 NOTE — TELEPHONE ENCOUNTER
03- Called the patient she stated that she just found out that she has a surgical clip in her abdominal cavity. States that she thinks that she will continue to have problems til it comes out.  Stated that she has spoke to the Radiologist.     Questioned what the Surgical clip is from?  Stated that it is from her GB when she was 18.       Explained that they use Surgical Clips for GB surgery but those types of clips are fine and doesn't usually cause any issues.     She states that its metal and she will have to do more research.

## 2025-03-20 ENCOUNTER — TELEPHONE (OUTPATIENT)
Dept: PRIMARY CARE CLINIC | Age: 35
End: 2025-03-20

## 2025-03-24 DIAGNOSIS — T81.509A FOREIGN BODY ACCIDENTALLY LEFT DURING A PROCEDURE, INITIAL ENCOUNTER: Primary | ICD-10-CM

## 2025-03-25 ENCOUNTER — TELEPHONE (OUTPATIENT)
Dept: PRIMARY CARE CLINIC | Age: 35
End: 2025-03-25

## 2025-04-14 DIAGNOSIS — F90.9 ADULT ADHD (ATTENTION DEFICIT HYPERACTIVITY DISORDER): ICD-10-CM

## 2025-04-14 DIAGNOSIS — Z76.0 MEDICATION REFILL: ICD-10-CM

## 2025-04-15 RX ORDER — DEXTROAMPHETAMINE SACCHARATE, AMPHETAMINE ASPARTATE MONOHYDRATE, DEXTROAMPHETAMINE SULFATE AND AMPHETAMINE SULFATE 7.5; 7.5; 7.5; 7.5 MG/1; MG/1; MG/1; MG/1
30 CAPSULE, EXTENDED RELEASE ORAL DAILY
Qty: 30 CAPSULE | Refills: 0 | Status: SHIPPED | OUTPATIENT
Start: 2025-04-15 | End: 2025-06-02 | Stop reason: SDUPTHER

## 2025-04-15 NOTE — TELEPHONE ENCOUNTER
Wendi JO Ashok called to request a refill on her medication.      Last office visit : 3/18/2025   Next office visit : Visit date not found     Last UDS:   Benzodiazepine Screen, Urine   Date Value Ref Range Status   02/26/2025 Negative  Final     Buprenorphine Urine   Date Value Ref Range Status   02/26/2025 Negative  Final     Cocaine Metabolite Screen, Urine   Date Value Ref Range Status   02/26/2025 Negative  Final     Gabapentin Screen, Urine   Date Value Ref Range Status   02/26/2025 Negative  Final     Oxycodone Screen, Ur   Date Value Ref Range Status   02/26/2025 Negative  Final     Propoxyphene Screen, Urine   Date Value Ref Range Status   02/26/2025 Negative  Final     THC Screen, Urine   Date Value Ref Range Status   02/26/2025 Negative  Final     Tricyclic Antidepressants, Urine   Date Value Ref Range Status   02/26/2025 Negative  Final       Last Pedro Pablo: 1/8/25  Medication Contract: 1/9/25   Last Fill: 3/3/25    Requested Prescriptions     Pending Prescriptions Disp Refills    amphetamine-dextroamphetamine (ADDERALL XR) 30 MG extended release capsule 30 capsule 0     Sig: Take 1 capsule by mouth daily for 30 days. Max Daily Amount: 30 mg         Please approve or refuse this medication.   Shazia Shore MA

## 2025-04-16 ENCOUNTER — TELEPHONE (OUTPATIENT)
Dept: PRIMARY CARE CLINIC | Age: 35
End: 2025-04-16

## 2025-04-16 NOTE — TELEPHONE ENCOUNTER
Pt came in and turned in [place] paperwork. It was brought back to me and there were no dates noted. I called the patient and asked if there was specific dates for her paperwork. She said that \"it is a chronic issue and I don't need to put specific dates I just need it to cover in case I have a headache at work it will cover me\" I said okay so there is not specific dates? She said \"She prescribes me pain medication for this and I can't believe that she doesn't know what she is prescribing\" I said that's not what I am saying. I am saying that al my paper says is for chronic neck pain. She said \"Oh I forgot Yuni left\" I said yes and she hung up on me. Patient was very rude and hateful.

## 2025-06-02 DIAGNOSIS — Z76.0 MEDICATION REFILL: ICD-10-CM

## 2025-06-02 DIAGNOSIS — F90.9 ADULT ADHD (ATTENTION DEFICIT HYPERACTIVITY DISORDER): ICD-10-CM

## 2025-06-02 RX ORDER — DEXTROAMPHETAMINE SACCHARATE, AMPHETAMINE ASPARTATE MONOHYDRATE, DEXTROAMPHETAMINE SULFATE AND AMPHETAMINE SULFATE 7.5; 7.5; 7.5; 7.5 MG/1; MG/1; MG/1; MG/1
30 CAPSULE, EXTENDED RELEASE ORAL DAILY
Qty: 30 CAPSULE | Refills: 0 | Status: SHIPPED | OUTPATIENT
Start: 2025-06-02 | End: 2025-07-02

## 2025-06-02 RX ORDER — CLONIDINE HYDROCHLORIDE 0.1 MG/1
TABLET ORAL
Qty: 30 TABLET | Refills: 0 | Status: SHIPPED | OUTPATIENT
Start: 2025-06-02

## 2025-06-02 NOTE — TELEPHONE ENCOUNTER
Wendi JO Ashok called to request a refill on her medication.      Last office visit : 3/18/2025   Next office visit : Visit date not found     Last UDS:   Benzodiazepine Screen, Urine   Date Value Ref Range Status   02/26/2025 Negative  Final     Buprenorphine Urine   Date Value Ref Range Status   02/26/2025 Negative  Final     Cocaine Metabolite Screen, Urine   Date Value Ref Range Status   02/26/2025 Negative  Final     Gabapentin Screen, Urine   Date Value Ref Range Status   02/26/2025 Negative  Final     Oxycodone Screen, Ur   Date Value Ref Range Status   02/26/2025 Negative  Final     Propoxyphene Screen, Urine   Date Value Ref Range Status   02/26/2025 Negative  Final     THC Screen, Urine   Date Value Ref Range Status   02/26/2025 Negative  Final     Tricyclic Antidepressants, Urine   Date Value Ref Range Status   02/26/2025 Negative  Final       Last Pedro Pablo:  04/15/25  Medication Contract:   01/09/25  Last Fill:  04/15/25    Requested Prescriptions     Pending Prescriptions Disp Refills    cloNIDine (CATAPRES) 0.1 MG tablet 90 tablet 0     Sig: TAKE 1 TABLET BY MOUTH EVERY NIGHT AS NEEDED FOR INSOMNIA    amphetamine-dextroamphetamine (ADDERALL XR) 30 MG extended release capsule 30 capsule 0     Sig: Take 1 capsule by mouth daily for 30 days. Max Daily Amount: 30 mg         Please approve or refuse this medication.   Lita Dominguez MA  
Pediatrician

## 2025-06-09 DIAGNOSIS — F40.10 SOCIAL ANXIETY DISORDER: ICD-10-CM

## 2025-06-09 DIAGNOSIS — Z76.0 MEDICATION REFILL: ICD-10-CM

## 2025-06-10 DIAGNOSIS — M62.838 MUSCLE SPASMS OF NECK: ICD-10-CM

## 2025-06-10 RX ORDER — FLUOXETINE HYDROCHLORIDE 40 MG/1
40 CAPSULE ORAL DAILY
Qty: 90 CAPSULE | Refills: 0 | Status: SHIPPED | OUTPATIENT
Start: 2025-06-10 | End: 2025-09-08

## 2025-06-10 NOTE — TELEPHONE ENCOUNTER
Wendi Pulido called to request a refill on her medication.      Last office visit : 3/18/2025   Next office visit : Visit date not found     Requested Prescriptions     Pending Prescriptions Disp Refills    FLUoxetine (PROZAC) 40 MG capsule 90 capsule 0     Sig: Take 1 capsule by mouth daily            Bev Tate MA

## 2025-06-11 RX ORDER — METHOCARBAMOL 750 MG/1
750 TABLET, FILM COATED ORAL 2 TIMES DAILY PRN
Qty: 60 TABLET | Refills: 0 | Status: SHIPPED | OUTPATIENT
Start: 2025-06-11

## 2025-06-11 NOTE — TELEPHONE ENCOUNTER
Wendi Pulido called to request a refill on her medication.      Last office visit : 3/18/2025   Next office visit : 9/2/2025     Requested Prescriptions     Pending Prescriptions Disp Refills    methocarbamol (ROBAXIN) 750 MG tablet 60 tablet 0     Sig: Take 1 tablet by mouth in the morning and 1 tablet in the evening.            Bev Tate MA

## 2025-06-17 ENCOUNTER — OFFICE VISIT (OUTPATIENT)
Dept: OBSTETRICS AND GYNECOLOGY | Age: 35
End: 2025-06-17
Payer: COMMERCIAL

## 2025-06-17 VITALS
HEIGHT: 68 IN | WEIGHT: 199.4 LBS | DIASTOLIC BLOOD PRESSURE: 82 MMHG | SYSTOLIC BLOOD PRESSURE: 114 MMHG | BODY MASS INDEX: 30.22 KG/M2

## 2025-06-17 DIAGNOSIS — N94.89 VAGINAL BURNING: ICD-10-CM

## 2025-06-17 DIAGNOSIS — Z11.3 SCREEN FOR SEXUALLY TRANSMITTED DISEASES: ICD-10-CM

## 2025-06-17 DIAGNOSIS — R30.0 DYSURIA: ICD-10-CM

## 2025-06-17 DIAGNOSIS — Z30.431 IUD CHECK UP: Primary | ICD-10-CM

## 2025-06-17 DIAGNOSIS — Z12.4 SCREENING FOR MALIGNANT NEOPLASM OF CERVIX: ICD-10-CM

## 2025-06-17 PROCEDURE — G0123 SCREEN CERV/VAG THIN LAYER: HCPCS | Performed by: OBSTETRICS & GYNECOLOGY

## 2025-06-17 PROCEDURE — 87624 HPV HI-RISK TYP POOLED RSLT: CPT | Performed by: OBSTETRICS & GYNECOLOGY

## 2025-06-17 RX ORDER — LEVONORGESTREL 19.5 MG/1
1 INTRAUTERINE DEVICE INTRAUTERINE ONCE
Qty: 1 EACH | Refills: 0 | Status: SHIPPED | OUTPATIENT
Start: 2025-06-17 | End: 2025-06-17

## 2025-06-17 RX ORDER — LEVONORGESTREL 19.5 MG/1
1 INTRAUTERINE DEVICE INTRAUTERINE ONCE
COMMUNITY
End: 2025-06-17 | Stop reason: SDUPTHER

## 2025-06-17 RX ORDER — FLUOXETINE HYDROCHLORIDE 40 MG/1
1 CAPSULE ORAL DAILY
COMMUNITY
Start: 2025-06-10

## 2025-06-17 RX ORDER — METHOCARBAMOL 750 MG/1
TABLET, FILM COATED ORAL
COMMUNITY
Start: 2025-06-11

## 2025-06-17 RX ORDER — DEXTROAMPHETAMINE SACCHARATE, AMPHETAMINE ASPARTATE MONOHYDRATE, DEXTROAMPHETAMINE SULFATE AND AMPHETAMINE SULFATE 7.5; 7.5; 7.5; 7.5 MG/1; MG/1; MG/1; MG/1
CAPSULE, EXTENDED RELEASE ORAL
COMMUNITY
Start: 2025-06-02

## 2025-06-17 NOTE — PROGRESS NOTES
"    Asif Yanes MD  [facility]  [address]  [place] [address]  Office [phone]  Fax [phone]      [facility]  Jaqui Ram  : 1990  MRN: [id number]    Subjective   Subjective     Chief Complaint   Patient presents with    Contraception     Pt here as self referring new gyn for IUD removal and to discuss birth control, her Kyleena was placed by Dr. Potter on 2020, last pap 2025 normal per pt report and denies any significant hx of abnormal with Dr. Avelar     Vaginal Itching     Pt also c/o vaginal burning and itching that has worsened in the last week and would like STD        History of Present Illness  Jaqui Ram is a 35 y.o. female , , who comes to the office today for IUD discussion.  Inserted . Time for replacement. Wants kyleena again. No menses with iud. Also wants STD testing. Going through separation and recently past week feeling burning in the vagina and pruritus. No discharge or odor. Wants pap rechecked given recent vaginal discomfort. H/o abnormal pap and colposcopy. No LEEP per patient.     Review of Systems   Genitourinary:  Positive for dysuria and frequency. Negative for decreased urine volume, difficulty urinating, dyspareunia, enuresis, flank pain, genital sores, hematuria, menstrual problem, pelvic pain, urgency, vaginal bleeding, vaginal discharge and vaginal pain.   All other systems reviewed and are negative.       The following portions of the patient's history were reviewed and updated as appropriate: allergies, current medications, past family history, past medical history, past social history, past surgical history, and problem list.    Objective    Objective     Vitals:   Visit Vitals  /82 (BP Location: Left arm, Patient Position: Sitting, Cuff Size: Large Adult)   Ht 172.7 cm (68\")   Wt 90.4 kg (199 lb 6.4 oz)   LMP  (LMP Unknown)   Breastfeeding No   BMI 30.32 kg/m²        Physical Exam  Vitals and nursing note " reviewed. Exam conducted with a chaperone present.   Constitutional:       General: She is not in acute distress.     Appearance: Normal appearance. She is well-developed. She is not ill-appearing.   HENT:      Head: Normocephalic and atraumatic.      Nose: No congestion or rhinorrhea.   Eyes:      General: No scleral icterus.        Right eye: No discharge.         Left eye: No discharge.      Extraocular Movements: Extraocular movements intact.      Conjunctiva/sclera: Conjunctivae normal.   Pulmonary:      Effort: Pulmonary effort is normal. No accessory muscle usage or respiratory distress.   Abdominal:      General: Abdomen is flat. There is no distension.      Palpations: Abdomen is soft. There is no mass.      Tenderness: There is no abdominal tenderness. There is no guarding or rebound.   Genitourinary:     General: Normal vulva.      Exam position: Lithotomy position.      Pubic Area: No pubic lice.       Labia:         Right: No tenderness or lesion.         Left: No tenderness or lesion.       Urethra: No prolapse.      Vagina: No signs of injury and foreign body. Vaginal discharge and tenderness present. No erythema, bleeding, lesions or prolapsed vaginal walls.      Cervix: Normal.      Uterus: Not deviated, not enlarged, not fixed, not tender and no uterine prolapse.       Adnexa:         Right: No mass, tenderness or fullness.          Left: No mass, tenderness or fullness.        Rectum: No external hemorrhoid.      Comments: IUD strings protruding from cervical os. Yellow discharge in vault. TTP along anterior vaginal wall on bimanual exam  Musculoskeletal:      Right lower leg: No edema.      Left lower leg: No edema.   Skin:     General: Skin is warm and dry.      Coloration: Skin is not ashen, cyanotic, jaundiced or pale.   Neurological:      General: No focal deficit present.      Mental Status: She is alert and oriented to person, place, and time.   Psychiatric:         Mood and Affect: Mood  normal.         Behavior: Behavior is cooperative.         Assessment & Plan   Assessment / Plan     Diagnoses and all orders for this visit:    1. IUD check up (Primary)  -     levonorgestrel (Kyleena) 19.5 MG intrauterine device IUD; To be inserted one time by prescriber. Route intrauterine.  Dispense: 1 each; Refill: 0    2. Screen for sexually transmitted diseases  -     HCV Antibody Rfx To Qnt PCR  -     Hepatitis B Surface Antigen  -     HIV-1 / O / 2 Ag / Antibody  -     RPR, Rfx Qn RPR / Confirm TP  -     NuSwab VG+ - Swab, Cervix    3. Vaginal burning  -     HCV Antibody Rfx To Qnt PCR  -     Hepatitis B Surface Antigen  -     HIV-1 / O / 2 Ag / Antibody  -     RPR, Rfx Qn RPR / Confirm TP  -     NuSwab VG+ - Swab, Cervix  -     Urinalysis With Microscopic - Urine, Clean Catch  -     Urine Culture - Urine, Urine, Clean Catch    4. Screening for malignant neoplasm of cervix  -     Liquid-based Pap Smear, Screening    5. Dysuria        Discussion: IUD - discussed. Kyleena again. Consent signed to order IUD. Return for insertion. Insertion discussed including preop ibuprofen/nsaid. Labs ordered for std testing. Mychart/call with results. Questions answered. She expressed understanding.     Follow-up: Return in about 2 weeks (around 7/1/2025) for iud removal and insertion.    Asif Yanes MD

## 2025-06-18 LAB
GEN CATEG CVX/VAG CYTO-IMP: NORMAL
HPV I/H RISK 4 DNA CVX QL PROBE+SIG AMP: NOT DETECTED
LAB AP CASE REPORT: NORMAL
LAB AP GYN ADDITIONAL INFORMATION: NORMAL
Lab: NORMAL
PATH INTERP SPEC-IMP: NORMAL
STAT OF ADQ CVX/VAG CYTO-IMP: NORMAL

## 2025-06-19 ENCOUNTER — RESULTS FOLLOW-UP (OUTPATIENT)
Dept: OBSTETRICS AND GYNECOLOGY | Age: 35
End: 2025-06-19
Payer: COMMERCIAL

## 2025-06-19 LAB
A VAGINAE DNA VAG QL NAA+PROBE: NORMAL SCORE
APPEARANCE UR: CLEAR
BACTERIA #/AREA URNS HPF: ABNORMAL /HPF
BACTERIA UR CULT: NORMAL
BACTERIA UR CULT: NORMAL
BILIRUB UR QL STRIP: NEGATIVE
BVAB2 DNA VAG QL NAA+PROBE: NORMAL SCORE
C ALBICANS DNA VAG QL NAA+PROBE: NEGATIVE
C GLABRATA DNA VAG QL NAA+PROBE: NEGATIVE
C TRACH DNA SPEC QL NAA+PROBE: NEGATIVE
CASTS URNS MICRO: ABNORMAL
COLOR UR: ABNORMAL
EPI CELLS #/AREA URNS HPF: ABNORMAL /HPF
GLUCOSE UR QL STRIP: NEGATIVE
HBV SURFACE AG SERPL QL IA: NEGATIVE
HCV AB SERPL QL IA: NON REACTIVE
HCV AB SERPL QL IA: NORMAL
HGB UR QL STRIP: NEGATIVE
HIV 1+2 AB+HIV1 P24 AG SERPL QL IA: NON REACTIVE
KETONES UR QL STRIP: NEGATIVE
LEUKOCYTE ESTERASE UR QL STRIP: NEGATIVE
MEGA1 DNA VAG QL NAA+PROBE: NORMAL SCORE
N GONORRHOEA DNA VAG QL NAA+PROBE: NEGATIVE
NITRITE UR QL STRIP: NEGATIVE
OBSERVATION IMP: NORMAL
PH UR STRIP: 8 [PH] (ref 5–8)
PROT UR QL STRIP: ABNORMAL
RBC #/AREA URNS HPF: ABNORMAL /HPF
RPR SER QL: NON REACTIVE
SP GR UR STRIP: 1.02 (ref 1–1.03)
T VAGINALIS DNA VAG QL NAA+PROBE: NEGATIVE
UROBILINOGEN UR STRIP-MCNC: ABNORMAL MG/DL
WBC #/AREA URNS HPF: ABNORMAL /HPF

## 2025-06-30 DIAGNOSIS — Z76.0 MEDICATION REFILL: ICD-10-CM

## 2025-06-30 DIAGNOSIS — F90.9 ADULT ADHD (ATTENTION DEFICIT HYPERACTIVITY DISORDER): ICD-10-CM

## 2025-07-02 RX ORDER — DEXTROAMPHETAMINE SACCHARATE, AMPHETAMINE ASPARTATE MONOHYDRATE, DEXTROAMPHETAMINE SULFATE AND AMPHETAMINE SULFATE 7.5; 7.5; 7.5; 7.5 MG/1; MG/1; MG/1; MG/1
30 CAPSULE, EXTENDED RELEASE ORAL DAILY
Qty: 30 CAPSULE | Refills: 0 | Status: SHIPPED | OUTPATIENT
Start: 2025-07-02 | End: 2025-08-01

## 2025-07-02 RX ORDER — CLONIDINE HYDROCHLORIDE 0.1 MG/1
TABLET ORAL
Qty: 30 TABLET | Refills: 0 | Status: SHIPPED | OUTPATIENT
Start: 2025-07-02

## 2025-07-02 NOTE — TELEPHONE ENCOUNTER
Wendi JO Ashok called to request a refill on her medication.      Last office visit : 3/18/2025   Next office visit : 6/30/2025     Last UDS:   Benzodiazepine Screen, Urine   Date Value Ref Range Status   02/26/2025 Negative  Final     Buprenorphine Urine   Date Value Ref Range Status   02/26/2025 Negative  Final     Cocaine Metabolite Screen, Urine   Date Value Ref Range Status   02/26/2025 Negative  Final     Gabapentin Screen, Urine   Date Value Ref Range Status   02/26/2025 Negative  Final     Oxycodone Screen, Ur   Date Value Ref Range Status   02/26/2025 Negative  Final     Propoxyphene Screen, Urine   Date Value Ref Range Status   02/26/2025 Negative  Final     THC Screen, Urine   Date Value Ref Range Status   02/26/2025 Negative  Final     Tricyclic Antidepressants, Urine   Date Value Ref Range Status   02/26/2025 Negative  Final       Last Pedro Pablo: 1/8/25  Medication Contract: 1/9/25   Last Fill: 6/2/25    Requested Prescriptions     Pending Prescriptions Disp Refills    amphetamine-dextroamphetamine (ADDERALL XR) 30 MG extended release capsule 30 capsule 0     Sig: Take 1 capsule by mouth daily for 30 days. Max Daily Amount: 30 mg         Please approve or refuse this medication.   Shazia Shore MA

## 2025-07-02 NOTE — TELEPHONE ENCOUNTER
Wendi Pulido called to request a refill on her medication.      Last office visit : 3/18/2025   Next office visit : 9/2/2025     Requested Prescriptions     Pending Prescriptions Disp Refills    cloNIDine (CATAPRES) 0.1 MG tablet 30 tablet 0     Sig: TAKE 1 TABLET BY MOUTH EVERY NIGHT AS NEEDED FOR INSOMNIA            Shazia Shore MA

## 2025-07-16 ENCOUNTER — OFFICE VISIT (OUTPATIENT)
Age: 35
End: 2025-07-16

## 2025-07-16 ENCOUNTER — PROCEDURE VISIT (OUTPATIENT)
Age: 35
End: 2025-07-16
Payer: COMMERCIAL

## 2025-07-16 VITALS
WEIGHT: 192 LBS | HEIGHT: 68 IN | DIASTOLIC BLOOD PRESSURE: 78 MMHG | BODY MASS INDEX: 29.1 KG/M2 | SYSTOLIC BLOOD PRESSURE: 122 MMHG

## 2025-07-16 VITALS
TEMPERATURE: 98.7 F | SYSTOLIC BLOOD PRESSURE: 122 MMHG | RESPIRATION RATE: 18 BRPM | HEART RATE: 91 BPM | WEIGHT: 195.2 LBS | BODY MASS INDEX: 29.68 KG/M2 | OXYGEN SATURATION: 98 % | DIASTOLIC BLOOD PRESSURE: 76 MMHG

## 2025-07-16 DIAGNOSIS — Z30.430 ENCOUNTER FOR INSERTION OF KYLEENA IUD: Primary | ICD-10-CM

## 2025-07-16 DIAGNOSIS — M54.2 NECK PAIN: Primary | ICD-10-CM

## 2025-07-16 LAB
B-HCG UR QL: NEGATIVE
EXPIRATION DATE: NORMAL
INTERNAL NEGATIVE CONTROL: NEGATIVE
INTERNAL POSITIVE CONTROL: POSITIVE
Lab: NORMAL

## 2025-07-16 RX ORDER — KETOROLAC TROMETHAMINE 30 MG/ML
30 INJECTION, SOLUTION INTRAMUSCULAR; INTRAVENOUS ONCE
Status: COMPLETED | OUTPATIENT
Start: 2025-07-16 | End: 2025-07-16

## 2025-07-16 RX ADMIN — KETOROLAC TROMETHAMINE 30 MG: 30 INJECTION, SOLUTION INTRAMUSCULAR; INTRAVENOUS at 11:51

## 2025-07-16 NOTE — PROGRESS NOTES
Medication was administered by Aria Murdock MA at 11:52 AM.    Medication: ketorolac tromethamine(toradol) 30mg   Amount: 30mg  Route: intramuscular  Site: Dorsogluteal right    Patient tolerated well.

## 2025-07-16 NOTE — PROGRESS NOTES
[facility], [facility] ([place])  [facility]  [address]  [place] [address]  Dept: [phone]  Dept Fax: [phone]    Wendi Pulido is a 35 y.o. female who presents today for her medical conditions/complaints as noted below.  Wendi Pulido is c/o of Other and Neck Pain (States she has chronic neck pain. )        HPI:     Wendi Pulido presents with complaints of neck pain. Patient states she has chronic neck pain and she has an appointment to be seen about it, but her appointment isn't until September. She is prescribed prescription pain medicine, but she states she doesn't like the way they make her feel so she doesn't take them. She has been taking ibuprofen at home, but it isn't giving her any relief. No loss of sensation or bowel or bladder movement. No other symptoms at this time. She has full ROM of the neck. She is stable.     Denies any recent antibiotic or steroid administration.      Past Medical History:   Diagnosis Date    Anxiety and depression 1/20/2019    Anxiety and depression     GERD (gastroesophageal reflux disease)      Past Surgical History:   Procedure Laterality Date    CHOLECYSTECTOMY      UPPER GASTROINTESTINAL ENDOSCOPY N/A 05/04/2021    Dr Francois, maribell 54 Fr Dil, partially obstructing lower esoph ring, (-)H. pylori, (+) GERD wo EOE, sm 1-2mm erosions sugg of chemical gastritis,    UPPER GASTROINTESTINAL ENDOSCOPY  05/04/2021    [employer] weighted Bougie dilator-54 Fr    WISDOM TOOTH EXTRACTION         Family History   Problem Relation Age of Onset    Diabetes Maternal Grandmother     Cancer Paternal Grandfather        Social History     Tobacco Use    Smoking status: Never    Smokeless tobacco: Never   Substance Use Topics    Alcohol use: Yes     Comment: occ      Current Outpatient Medications   Medication Sig Dispense Refill    amphetamine-dextroamphetamine (ADDERALL XR) 30 MG extended release capsule Take 1 capsule by mouth

## 2025-07-16 NOTE — PATIENT INSTRUCTIONS
Neck Pain    Toradol injection given today; do not take any more NSAIDs today.    Take tylenol/ibuprofen as needed for pain.    Use topical heat/ice as needed for pain.    Encouraged stretching the neck at home.     Work note provided; patient is okay to return to work tomorrow, 7/17/25.    F/u with PCP if symptoms worsening or not improving.

## 2025-07-16 NOTE — PROGRESS NOTES
Jaqui Ram is a 35 y.o. female  is here for Kyleena IUD removal and reinsertion.  Her last Pap smear was 06/17/2025 and was normal.  She has no history of cervical dysplasia. Urine pregnancy test negative in office today.    We have discussed the IUD, common side effects, and potential adverse events like uterine perforation, infection, or expulsion.  She has signed the consent form after answering her questions.    Date of procedure:  7/16/2025    Local anesthesia used:  no    Procedure documentation:    A speculum was placed in order to view the cervix.  A tenaculum did not need to be placed on the anterior cervical lip.  Cervical dilation did not need to be performed in order to access the string.  The IUD string was easily seen.  The string was grasped and the IUD was removed without difficulty.  The IUD did not appear to be adherent to the uterine cavity. It was removed intact.    Kyleena IUD lot number [id number] EXP: 03/2027 NDC: [id number] was placed today. The cervix was visualized and a sample was obtained for gonorrhea and chlamydia testing. The cervix was then cleansed with BETADINE swabs.  The anterior lip was grasped with a single-tooth tenaculum.  The uterus sounded to 7 cm.  The IUD was placed at the uterine fundus using sterile technique in a standard fashion.  The applicator was removed and the strings trimmed to 3 cm length.  The tenaculum site was made hemostatic with silver nitrate sticks. She tolerated the procedure well.    Jaqui Ram will return in one month for an IUD check.  She is given instructions to call if she has any fevers, heavy bleeding, severe pain, or nausea.         Diagnoses and all orders for this visit:    1. Encounter for insertion of Kyleena IUD (Primary)  -     Chlamydia trachomatis, Neisseria gonorrhoeae, PCR w/ confirmation - Swab, Cervix  -     POC Pregnancy, Urine      This note was electronically signed.    VANITA Croft          
Simple: Patient demonstrates quick and easy understanding

## 2025-07-20 LAB
C TRACH RRNA SPEC QL NAA+PROBE: NEGATIVE
N GONORRHOEA RRNA SPEC QL NAA+PROBE: NEGATIVE

## 2025-07-25 ENCOUNTER — OFFICE VISIT (OUTPATIENT)
Age: 35
End: 2025-07-25

## 2025-07-25 VITALS
HEIGHT: 68 IN | OXYGEN SATURATION: 97 % | TEMPERATURE: 97 F | HEART RATE: 85 BPM | SYSTOLIC BLOOD PRESSURE: 117 MMHG | RESPIRATION RATE: 20 BRPM | DIASTOLIC BLOOD PRESSURE: 79 MMHG | WEIGHT: 188.6 LBS | BODY MASS INDEX: 28.58 KG/M2

## 2025-07-25 DIAGNOSIS — M62.838 MUSCLE SPASM: ICD-10-CM

## 2025-07-25 DIAGNOSIS — M54.2 NECK PAIN, MUSCULOSKELETAL: Primary | ICD-10-CM

## 2025-07-25 RX ORDER — KETOROLAC TROMETHAMINE 30 MG/ML
60 INJECTION, SOLUTION INTRAMUSCULAR; INTRAVENOUS ONCE
Status: COMPLETED | OUTPATIENT
Start: 2025-07-25 | End: 2025-07-25

## 2025-07-25 RX ORDER — KETOROLAC TROMETHAMINE 10 MG/1
10 TABLET, FILM COATED ORAL EVERY 6 HOURS PRN
Qty: 20 TABLET | Refills: 0 | Status: SHIPPED | OUTPATIENT
Start: 2025-07-25 | End: 2026-07-25

## 2025-07-25 RX ADMIN — KETOROLAC TROMETHAMINE 60 MG: 30 INJECTION, SOLUTION INTRAMUSCULAR; INTRAVENOUS at 16:15

## 2025-07-25 ASSESSMENT — ENCOUNTER SYMPTOMS
NAUSEA: 0
SINUS PRESSURE: 0
RHINORRHEA: 0
ABDOMINAL DISTENTION: 0
EYE ITCHING: 0
SORE THROAT: 0
SINUS PAIN: 0
CONSTIPATION: 0
EYE PAIN: 0
TROUBLE SWALLOWING: 0
CHEST TIGHTNESS: 0
SHORTNESS OF BREATH: 0
COLOR CHANGE: 0
ABDOMINAL PAIN: 0
DIARRHEA: 0
COUGH: 0
EYE DISCHARGE: 0
VOMITING: 0
APNEA: 0
WHEEZING: 0

## 2025-07-25 NOTE — PROGRESS NOTES
Medication was administered by Aria Murdock MA at 4:15 PM.    Medication: ketorolac tromethamine(Toradol) 60mg  Amount: 60mg  Route: intramuscular  Site: Dorsogluteal right    Patient tolerated well.  
without pain, normal range of motion and neck supple. No rigidity. Muscular tenderness present. No pain with movement. Normal range of motion.   Skin:     General: Skin is warm and dry.      Coloration: Skin is not cyanotic or pale.      Findings: No rash.   Neurological:      General: No focal deficit present.      Mental Status: She is alert and oriented to person, place, and time.      Sensory: Sensation is intact.      Motor: Motor function is intact.      Coordination: Coordination is intact.      Gait: Gait is intact.   Psychiatric:         Attention and Perception: Attention normal.         Mood and Affect: Mood and affect normal.         Speech: Speech normal.         Behavior: Behavior normal. Behavior is cooperative.       /79   Pulse 85   Temp 97 °F (36.1 °C)   Resp 20   Ht 1.727 m (5' 8\")   Wt 85.5 kg (188 lb 9.6 oz)   SpO2 97%   BMI 28.68 kg/m²     Assessment   Assessment & Plan      Diagnosis Orders   1. Neck pain, musculoskeletal  ketorolac (TORADOL) injection 60 mg    ketorolac (TORADOL) 10 MG tablet      2. Muscle spasm  tiZANidine (ZANAFLEX) 4 MG tablet          Plan     60 mg Toradol injection given in office today. 5-day course of Toradol prescribed, advised to start this tomorrow. Discussed importance of avoiding other NSAIDs while taking Toradol therapy. Patient is currently prescribed Robaxin. Patient reports that she does not take this due to the intense drowsiness effect this has. Advised to stop taking this and to try Zanaflex that was prescribed. Discussed that she may take half of a tablet. Discussed that this may cause drowsiness and do not drive while taking this. OTC medication use and at home supportive care discussed. Discussed concerning signs/symptoms and when patient is to follow up in the ER or with PCP/RTC. Advised patient to follow-up with PCP if symptoms do not improve. Patient agreeable to plan of care, verbalized understanding, and had no further

## 2025-07-25 NOTE — PATIENT INSTRUCTIONS
Toradol injection given in office today.  Start oral Toradol tomorrow. Avoid other NSAIDs (ibuprofen, naproxen, aspirin) while taking Toradol therapy. May take Tylenol in between Toradol if needed for breakthrough pain.  Take tizanidine (muscle relaxer) as prescribed. Do not drive or operate heavy machinery while taking this. Stop Robaxin.  May apply Voltaren gel and/or Icy Hot.  Utilize a heating pad for 10-15 minutes at a time, multiple times daily.  Consider massage therapy.   Follow-up with primary care provider as scheduled for further evaluation and management.

## 2025-08-05 DIAGNOSIS — Z76.0 MEDICATION REFILL: ICD-10-CM

## 2025-08-05 DIAGNOSIS — F90.9 ADULT ADHD (ATTENTION DEFICIT HYPERACTIVITY DISORDER): ICD-10-CM

## 2025-08-07 RX ORDER — CLONIDINE HYDROCHLORIDE 0.1 MG/1
TABLET ORAL
Qty: 30 TABLET | Refills: 0 | Status: SHIPPED | OUTPATIENT
Start: 2025-08-07

## 2025-08-07 RX ORDER — DEXTROAMPHETAMINE SACCHARATE, AMPHETAMINE ASPARTATE MONOHYDRATE, DEXTROAMPHETAMINE SULFATE AND AMPHETAMINE SULFATE 7.5; 7.5; 7.5; 7.5 MG/1; MG/1; MG/1; MG/1
30 CAPSULE, EXTENDED RELEASE ORAL DAILY
Qty: 30 CAPSULE | Refills: 0 | Status: SHIPPED | OUTPATIENT
Start: 2025-08-07 | End: 2025-09-06

## 2025-08-20 ENCOUNTER — OFFICE VISIT (OUTPATIENT)
Age: 35
End: 2025-08-20

## 2025-08-20 VITALS
SYSTOLIC BLOOD PRESSURE: 124 MMHG | HEART RATE: 82 BPM | DIASTOLIC BLOOD PRESSURE: 74 MMHG | WEIGHT: 195.6 LBS | RESPIRATION RATE: 20 BRPM | OXYGEN SATURATION: 98 % | HEIGHT: 68 IN | BODY MASS INDEX: 29.64 KG/M2 | TEMPERATURE: 97.8 F

## 2025-08-20 DIAGNOSIS — N76.0 ACUTE VAGINITIS: Primary | ICD-10-CM

## 2025-08-20 LAB
BILIRUBIN, POC: NEGATIVE
BLOOD URINE, POC: NEGATIVE
CLARITY, POC: NORMAL
COLOR, POC: NORMAL
GLUCOSE URINE, POC: NEGATIVE MG/DL
KETONES, POC: NEGATIVE MG/DL
LEUKOCYTE EST, POC: NEGATIVE
NITRITE, POC: NEGATIVE
PH, POC: 6.5
PROTEIN, POC: NEGATIVE MG/DL
SPECIFIC GRAVITY, POC: 1.02
UROBILINOGEN, POC: 0.2 MG/DL

## 2025-08-20 RX ORDER — LEVONORGESTREL 19.5 MG/1
1 INTRAUTERINE DEVICE INTRAUTERINE ONCE
COMMUNITY
Start: 2025-06-17

## 2025-08-20 RX ORDER — FLUCONAZOLE 150 MG/1
TABLET ORAL
Qty: 2 TABLET | Refills: 0 | Status: SHIPPED | OUTPATIENT
Start: 2025-08-20

## 2025-08-21 LAB
Lab: NORMAL
REPORT: NORMAL
THIS TEST SENT TO: NORMAL

## 2025-08-22 LAB — MISCELLANEOUS LAB TEST ORDER: ABNORMAL

## 2025-09-02 ENCOUNTER — OFFICE VISIT (OUTPATIENT)
Age: 35
End: 2025-09-02

## 2025-09-02 ENCOUNTER — OFFICE VISIT (OUTPATIENT)
Dept: PRIMARY CARE CLINIC | Age: 35
End: 2025-09-02
Payer: COMMERCIAL

## 2025-09-02 VITALS
HEART RATE: 63 BPM | TEMPERATURE: 97.3 F | DIASTOLIC BLOOD PRESSURE: 72 MMHG | RESPIRATION RATE: 18 BRPM | BODY MASS INDEX: 28.95 KG/M2 | WEIGHT: 190.4 LBS | OXYGEN SATURATION: 100 % | SYSTOLIC BLOOD PRESSURE: 136 MMHG

## 2025-09-02 VITALS
SYSTOLIC BLOOD PRESSURE: 132 MMHG | HEART RATE: 101 BPM | WEIGHT: 190 LBS | DIASTOLIC BLOOD PRESSURE: 80 MMHG | TEMPERATURE: 98 F | BODY MASS INDEX: 28.89 KG/M2 | OXYGEN SATURATION: 99 %

## 2025-09-02 DIAGNOSIS — S93.402D SPRAIN OF LEFT ANKLE, UNSPECIFIED LIGAMENT, SUBSEQUENT ENCOUNTER: ICD-10-CM

## 2025-09-02 DIAGNOSIS — M54.12 CERVICAL RADICULOPATHY: ICD-10-CM

## 2025-09-02 DIAGNOSIS — R03.0 ELEVATED BLOOD PRESSURE READING: ICD-10-CM

## 2025-09-02 DIAGNOSIS — F90.0 ADHD (ATTENTION DEFICIT HYPERACTIVITY DISORDER), INATTENTIVE TYPE: ICD-10-CM

## 2025-09-02 DIAGNOSIS — M50.30 DDD (DEGENERATIVE DISC DISEASE), CERVICAL: ICD-10-CM

## 2025-09-02 DIAGNOSIS — F41.9 ANXIETY AND DEPRESSION: Chronic | ICD-10-CM

## 2025-09-02 DIAGNOSIS — G89.29 CHRONIC NECK PAIN: ICD-10-CM

## 2025-09-02 DIAGNOSIS — F32.A ANXIETY AND DEPRESSION: Chronic | ICD-10-CM

## 2025-09-02 DIAGNOSIS — Z51.81 MEDICATION MONITORING ENCOUNTER: ICD-10-CM

## 2025-09-02 DIAGNOSIS — S99.912A INJURY OF LEFT ANKLE, INITIAL ENCOUNTER: Primary | ICD-10-CM

## 2025-09-02 DIAGNOSIS — M54.2 CHRONIC NECK PAIN: ICD-10-CM
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
## 2025-09-02 PROCEDURE — 99215 OFFICE O/P EST HI 40 MIN: CPT | Performed by: NURSE PRACTITIONER

## 2025-09-02 RX ORDER — MELOXICAM 15 MG/1
15 TABLET ORAL DAILY PRN
Qty: 30 TABLET | Refills: 1 | Status: SHIPPED | OUTPATIENT
Start: 2025-09-02

## 2025-09-02 ASSESSMENT — ENCOUNTER SYMPTOMS
COUGH: 0
NAUSEA: 0
ABDOMINAL PAIN: 0
DIARRHEA: 0
WHEEZING: 0
SHORTNESS OF BREATH: 0
CHEST TIGHTNESS: 0
SORE THROAT: 0

## 2025-09-04 DIAGNOSIS — Z79.899 MEDICATION MANAGEMENT: Primary | ICD-10-CM

## 2025-09-04 LAB
ALCOHOL URINE: ABNORMAL
AMPHETAMINE SCREEN URINE: POSITIVE
BARBITURATE SCREEN URINE: ABNORMAL
BENZODIAZEPINE SCREEN, URINE: ABNORMAL
BUPRENORPHINE URINE: ABNORMAL
COCAINE METABOLITE SCREEN URINE: ABNORMAL
FENTANYL SCREEN, URINE: ABNORMAL
GABAPENTIN SCREEN, URINE: ABNORMAL
MDMA, URINE: ABNORMAL
METHADONE SCREEN, URINE: ABNORMAL
METHAMPHETAMINE, URINE: ABNORMAL
OPIATE SCREEN URINE: ABNORMAL
OXYCODONE SCREEN URINE: ABNORMAL
PHENCYCLIDINE SCREEN URINE: ABNORMAL
PROPOXYPHENE SCREEN, URINE: ABNORMAL
SYNTHETIC CANNABINOIDS(K2) SCREEN, URINE: ABNORMAL
THC SCREEN, URINE: ABNORMAL
TRAMADOL SCREEN URINE: ABNORMAL
TRICYCLIC ANTIDEPRESSANTS, UR: ABNORMAL

## 2025-09-04 PROCEDURE — 80305 DRUG TEST PRSMV DIR OPT OBS: CPT | Performed by: NURSE PRACTITIONER

## (undated) DEVICE — FORCEPS BX 240CM 2.4MM L NDL RAD JAW 4 M00513334